# Patient Record
Sex: FEMALE | Race: WHITE | Employment: UNEMPLOYED | URBAN - METROPOLITAN AREA
[De-identification: names, ages, dates, MRNs, and addresses within clinical notes are randomized per-mention and may not be internally consistent; named-entity substitution may affect disease eponyms.]

---

## 2017-09-14 ENCOUNTER — TELEPHONE (OUTPATIENT)
Dept: ADMINISTRATIVE | Age: 57
End: 2017-09-14

## 2018-01-31 ENCOUNTER — APPOINTMENT (OUTPATIENT)
Dept: CT IMAGING | Age: 58
End: 2018-01-31
Payer: MEDICARE

## 2018-01-31 ENCOUNTER — HOSPITAL ENCOUNTER (EMERGENCY)
Age: 58
Discharge: HOME | End: 2018-01-31
Attending: STUDENT IN AN ORGANIZED HEALTH CARE EDUCATION/TRAINING PROGRAM
Payer: MEDICARE

## 2018-01-31 ENCOUNTER — APPOINTMENT (OUTPATIENT)
Dept: GENERAL RADIOLOGY | Age: 58
End: 2018-01-31
Payer: MEDICARE

## 2018-01-31 VITALS
HEIGHT: 60 IN | DIASTOLIC BLOOD PRESSURE: 81 MMHG | BODY MASS INDEX: 25.52 KG/M2 | HEART RATE: 72 BPM | TEMPERATURE: 97 F | SYSTOLIC BLOOD PRESSURE: 143 MMHG | WEIGHT: 130 LBS | RESPIRATION RATE: 18 BRPM | OXYGEN SATURATION: 96 %

## 2018-01-31 DIAGNOSIS — R11.2 NAUSEA AND VOMITING, INTRACTABILITY OF VOMITING NOT SPECIFIED, UNSPECIFIED VOMITING TYPE: Primary | ICD-10-CM

## 2018-01-31 DIAGNOSIS — K29.00 ACUTE GASTRITIS, PRESENCE OF BLEEDING UNSPECIFIED, UNSPECIFIED GASTRITIS TYPE: ICD-10-CM

## 2018-01-31 DIAGNOSIS — R07.89 CHEST WALL PAIN: ICD-10-CM

## 2018-01-31 DIAGNOSIS — R10.13 ABDOMINAL PAIN, EPIGASTRIC: ICD-10-CM

## 2018-01-31 LAB
ALBUMIN SERPL-MCNC: 3.7 G/DL (ref 3.9–4.9)
ALP BLD-CCNC: 74 U/L (ref 40–130)
ALT SERPL-CCNC: 8 U/L (ref 0–33)
AMMONIA: 29 UMOL/L (ref 11–51)
AMPHETAMINE SCREEN, URINE: POSITIVE
ANION GAP SERPL CALCULATED.3IONS-SCNC: 12 MEQ/L (ref 7–13)
APTT: 25.8 SEC (ref 21.6–35.4)
AST SERPL-CCNC: 17 U/L (ref 0–35)
BARBITURATE SCREEN URINE: ABNORMAL
BASOPHILS ABSOLUTE: 0 K/UL (ref 0–0.2)
BASOPHILS RELATIVE PERCENT: 0.7 %
BENZODIAZEPINE SCREEN, URINE: ABNORMAL
BILIRUB SERPL-MCNC: 0.3 MG/DL (ref 0–1.2)
BILIRUBIN URINE: NEGATIVE
BLOOD, URINE: NEGATIVE
BUN BLDV-MCNC: 16 MG/DL (ref 6–20)
C-REACTIVE PROTEIN, HIGH SENSITIVITY: 0.3 MG/L (ref 0–5)
CALCIUM SERPL-MCNC: 9.8 MG/DL (ref 8.6–10.2)
CANNABINOID SCREEN URINE: ABNORMAL
CHLORIDE BLD-SCNC: 101 MEQ/L (ref 98–107)
CHOLESTEROL, TOTAL: 142 MG/DL (ref 0–199)
CHP ED QC CHECK: YES
CLARITY: ABNORMAL
CO2: 28 MEQ/L (ref 22–29)
COCAINE METABOLITE SCREEN URINE: ABNORMAL
COLOR: YELLOW
CREAT SERPL-MCNC: 0.79 MG/DL (ref 0.5–0.9)
EKG ATRIAL RATE: 68 BPM
EKG P AXIS: -28 DEGREES
EKG P-R INTERVAL: 146 MS
EKG Q-T INTERVAL: 466 MS
EKG QRS DURATION: 142 MS
EKG QTC CALCULATION (BAZETT): 495 MS
EKG R AXIS: -47 DEGREES
EKG T AXIS: 28 DEGREES
EKG VENTRICULAR RATE: 68 BPM
EOSINOPHILS ABSOLUTE: 0.1 K/UL (ref 0–0.7)
EOSINOPHILS RELATIVE PERCENT: 1.4 %
ETHANOL PERCENT: NORMAL G/DL
ETHANOL: <10 MG/DL (ref 0–0.08)
GFR AFRICAN AMERICAN: >60
GFR NON-AFRICAN AMERICAN: >60
GLOBULIN: 2.3 G/DL (ref 2.3–3.5)
GLUCOSE BLD-MCNC: 87 MG/DL (ref 74–109)
GLUCOSE URINE: NEGATIVE MG/DL
HCT VFR BLD CALC: 41.4 % (ref 37–47)
HDLC SERPL-MCNC: 69 MG/DL (ref 40–59)
HEMOGLOBIN: 13.5 G/DL (ref 12–16)
INR BLD: 1
KETONES, URINE: NEGATIVE MG/DL
LACTIC ACID: 1.1 MMOL/L (ref 0.5–2.2)
LDL CHOLESTEROL CALCULATED: 54 MG/DL (ref 0–129)
LEUKOCYTE ESTERASE, URINE: NEGATIVE
LIPASE: 8 U/L (ref 13–60)
LYMPHOCYTES ABSOLUTE: 1.5 K/UL (ref 1–4.8)
LYMPHOCYTES RELATIVE PERCENT: 28.8 %
Lab: ABNORMAL
MAGNESIUM: 2.3 MG/DL (ref 1.7–2.3)
MCH RBC QN AUTO: 27.4 PG (ref 27–31.3)
MCHC RBC AUTO-ENTMCNC: 32.6 % (ref 33–37)
MCV RBC AUTO: 84 FL (ref 82–100)
MONOCYTES ABSOLUTE: 0.3 K/UL (ref 0.2–0.8)
MONOCYTES RELATIVE PERCENT: 5.8 %
NEUTROPHILS ABSOLUTE: 3.3 K/UL (ref 1.4–6.5)
NEUTROPHILS RELATIVE PERCENT: 63.3 %
NITRITE, URINE: NEGATIVE
OPIATE SCREEN URINE: ABNORMAL
PDW BLD-RTO: 17.2 % (ref 11.5–14.5)
PH UA: 8 (ref 5–9)
PHENCYCLIDINE SCREEN URINE: ABNORMAL
PLATELET # BLD: 201 K/UL (ref 130–400)
POTASSIUM SERPL-SCNC: 3.9 MEQ/L (ref 3.5–5.1)
PREGNANCY TEST URINE, POC: NEGATIVE
PRO-BNP: 298 PG/ML
PROTEIN UA: NEGATIVE MG/DL
PROTHROMBIN TIME: 10.9 SEC (ref 8.1–13.7)
RAPID INFLUENZA  B AGN: NEGATIVE
RAPID INFLUENZA A AGN: NEGATIVE
RBC # BLD: 4.93 M/UL (ref 4.2–5.4)
SODIUM BLD-SCNC: 141 MEQ/L (ref 132–144)
SPECIFIC GRAVITY UA: 1.01 (ref 1–1.03)
TOTAL CK: 66 U/L (ref 0–170)
TOTAL PROTEIN: 6 G/DL (ref 6.4–8.1)
TRIGL SERPL-MCNC: 94 MG/DL (ref 0–200)
TROPONIN: <0.01 NG/ML (ref 0–0.01)
TSH SERPL DL<=0.05 MIU/L-ACNC: 1.83 UIU/ML (ref 0.27–4.2)
URINE REFLEX TO CULTURE: ABNORMAL
UROBILINOGEN, URINE: 1 E.U./DL
WBC # BLD: 5.2 K/UL (ref 4.8–10.8)

## 2018-01-31 PROCEDURE — 81003 URINALYSIS AUTO W/O SCOPE: CPT

## 2018-01-31 PROCEDURE — 6360000004 HC RX CONTRAST MEDICATION: Performed by: STUDENT IN AN ORGANIZED HEALTH CARE EDUCATION/TRAINING PROGRAM

## 2018-01-31 PROCEDURE — 2580000003 HC RX 258: Performed by: STUDENT IN AN ORGANIZED HEALTH CARE EDUCATION/TRAINING PROGRAM

## 2018-01-31 PROCEDURE — S0028 INJECTION, FAMOTIDINE, 20 MG: HCPCS | Performed by: STUDENT IN AN ORGANIZED HEALTH CARE EDUCATION/TRAINING PROGRAM

## 2018-01-31 PROCEDURE — 99285 EMERGENCY DEPT VISIT HI MDM: CPT

## 2018-01-31 PROCEDURE — 86141 C-REACTIVE PROTEIN HS: CPT

## 2018-01-31 PROCEDURE — 83690 ASSAY OF LIPASE: CPT

## 2018-01-31 PROCEDURE — 84443 ASSAY THYROID STIM HORMONE: CPT

## 2018-01-31 PROCEDURE — 85025 COMPLETE CBC W/AUTO DIFF WBC: CPT

## 2018-01-31 PROCEDURE — 96375 TX/PRO/DX INJ NEW DRUG ADDON: CPT

## 2018-01-31 PROCEDURE — 82140 ASSAY OF AMMONIA: CPT

## 2018-01-31 PROCEDURE — G0480 DRUG TEST DEF 1-7 CLASSES: HCPCS

## 2018-01-31 PROCEDURE — 6360000002 HC RX W HCPCS: Performed by: STUDENT IN AN ORGANIZED HEALTH CARE EDUCATION/TRAINING PROGRAM

## 2018-01-31 PROCEDURE — 93005 ELECTROCARDIOGRAM TRACING: CPT

## 2018-01-31 PROCEDURE — 80307 DRUG TEST PRSMV CHEM ANLYZR: CPT

## 2018-01-31 PROCEDURE — 36415 COLL VENOUS BLD VENIPUNCTURE: CPT

## 2018-01-31 PROCEDURE — 85610 PROTHROMBIN TIME: CPT

## 2018-01-31 PROCEDURE — 83605 ASSAY OF LACTIC ACID: CPT

## 2018-01-31 PROCEDURE — 82550 ASSAY OF CK (CPK): CPT

## 2018-01-31 PROCEDURE — 80061 LIPID PANEL: CPT

## 2018-01-31 PROCEDURE — 84484 ASSAY OF TROPONIN QUANT: CPT

## 2018-01-31 PROCEDURE — 87040 BLOOD CULTURE FOR BACTERIA: CPT

## 2018-01-31 PROCEDURE — 74177 CT ABD & PELVIS W/CONTRAST: CPT

## 2018-01-31 PROCEDURE — 71046 X-RAY EXAM CHEST 2 VIEWS: CPT

## 2018-01-31 PROCEDURE — 80053 COMPREHEN METABOLIC PANEL: CPT

## 2018-01-31 PROCEDURE — 83880 ASSAY OF NATRIURETIC PEPTIDE: CPT

## 2018-01-31 PROCEDURE — 2500000003 HC RX 250 WO HCPCS: Performed by: STUDENT IN AN ORGANIZED HEALTH CARE EDUCATION/TRAINING PROGRAM

## 2018-01-31 PROCEDURE — 96374 THER/PROPH/DIAG INJ IV PUSH: CPT

## 2018-01-31 PROCEDURE — 86403 PARTICLE AGGLUT ANTBDY SCRN: CPT

## 2018-01-31 PROCEDURE — 83735 ASSAY OF MAGNESIUM: CPT

## 2018-01-31 PROCEDURE — 85730 THROMBOPLASTIN TIME PARTIAL: CPT

## 2018-01-31 RX ORDER — FAMOTIDINE 20 MG/1
20 TABLET, FILM COATED ORAL 2 TIMES DAILY
Qty: 20 TABLET | Refills: 0 | Status: SHIPPED | OUTPATIENT
Start: 2018-01-31 | End: 2018-02-26

## 2018-01-31 RX ORDER — ONDANSETRON 4 MG/1
4 TABLET, ORALLY DISINTEGRATING ORAL EVERY 8 HOURS PRN
Qty: 8 TABLET | Refills: 0 | Status: SHIPPED | OUTPATIENT
Start: 2018-01-31 | End: 2018-02-24 | Stop reason: ALTCHOICE

## 2018-01-31 RX ORDER — SODIUM CHLORIDE 0.9 % (FLUSH) 0.9 %
10 SYRINGE (ML) INJECTION PRN
Status: DISCONTINUED | OUTPATIENT
Start: 2018-01-31 | End: 2018-01-31 | Stop reason: HOSPADM

## 2018-01-31 RX ORDER — ONDANSETRON 2 MG/ML
4 INJECTION INTRAMUSCULAR; INTRAVENOUS ONCE
Status: COMPLETED | OUTPATIENT
Start: 2018-01-31 | End: 2018-01-31

## 2018-01-31 RX ORDER — 0.9 % SODIUM CHLORIDE 0.9 %
1000 INTRAVENOUS SOLUTION INTRAVENOUS ONCE
Status: COMPLETED | OUTPATIENT
Start: 2018-01-31 | End: 2018-01-31

## 2018-01-31 RX ADMIN — ONDANSETRON 4 MG: 2 INJECTION INTRAMUSCULAR; INTRAVENOUS at 15:14

## 2018-01-31 RX ADMIN — FAMOTIDINE 20 MG: 10 INJECTION, SOLUTION INTRAVENOUS at 15:14

## 2018-01-31 RX ADMIN — IOPAMIDOL 100 ML: 755 INJECTION, SOLUTION INTRAVENOUS at 16:18

## 2018-01-31 RX ADMIN — Medication 10 ML: at 16:19

## 2018-01-31 RX ADMIN — SODIUM CHLORIDE 1000 ML: 9 INJECTION, SOLUTION INTRAVENOUS at 15:14

## 2018-01-31 ASSESSMENT — PAIN DESCRIPTION - LOCATION: LOCATION: CHEST

## 2018-01-31 ASSESSMENT — PAIN DESCRIPTION - DESCRIPTORS: DESCRIPTORS: BURNING

## 2018-01-31 ASSESSMENT — PAIN SCALES - GENERAL: PAINLEVEL_OUTOF10: 8

## 2018-01-31 ASSESSMENT — ENCOUNTER SYMPTOMS
NAUSEA: 1
ABDOMINAL PAIN: 1
DIARRHEA: 0
COUGH: 1
SINUS PRESSURE: 0
VOMITING: 1
BACK PAIN: 0
CHEST TIGHTNESS: 0
TROUBLE SWALLOWING: 0
SHORTNESS OF BREATH: 0

## 2018-01-31 ASSESSMENT — HEART SCORE: ECG: 1

## 2018-01-31 NOTE — ED PROVIDER NOTES
syncope, weakness and headaches. Hematological: Does not bruise/bleed easily. All other systems reviewed and are negative. Except as noted above the remainder of the review of systems was reviewed and negative. PAST MEDICAL HISTORY     Past Medical History:   Diagnosis Date    Cirrhosis ([address]     Elevated LFTs     ETOH abuse     Heart murmur     Hypertension          SURGICAL HISTORY       Past Surgical History:   Procedure Laterality Date    CARDIAC CATHETERIZATION  4-2013    clean \"luminal irregs\"    CHOLECYSTECTOMY  6/22/13    Lapchole    COLONOSCOPY  9/2011    polyps needs by 2016    GASTRIC BYPASS SURGERY  2003    LAPAROSCOPY  1992    OTHER SURGICAL HISTORY      skin removal from abd/weight loss    TOTAL HIP ARTHROPLASTY  4-2012    left hip    UPPER GASTROINTESTINAL ENDOSCOPY  9-26-11    marino-polyps    UPPER GASTROINTESTINAL ENDOSCOPY  5/18/14     DR. NEAL         CURRENT MEDICATIONS       Previous Medications    ASCORBIC ACID (VITAMIN C) 500 MG TABLET    Take 1 tablet by mouth daily    BIOTIN 5 MG TABS    Take 5 mg by mouth daily    CALCIUM CARBONATE-VITAMIN D (OYSTER SHELL CALCIUM/D) 500-200 MG-UNIT TABS    Take 1 tablet by mouth daily    MAGNESIUM OXIDE (MAG-OX) 400 MG TABLET    Take 1 tablet by mouth daily    OMEPRAZOLE (PRILOSEC) 20 MG DELAYED RELEASE CAPSULE    TAKE ONE CAPSULE BY MOUTH TWO TIMES A DAY    POTASSIUM CHLORIDE (KLOR-CON M) 20 MEQ EXTENDED RELEASE TABLET    TAKE ONE TABLET BY MOUTH EVERY DAY IN ADDITION TO 10 MEQ TO TOTAL 30 MEQ DAILY    SERTRALINE (ZOLOFT) 100 MG TABLET    TAKE ONE TABLET BY MOUTH EVERY DAY    SUCRALFATE (CARAFATE) 1 GM TABLET    TAKE ONE TABLET BY MOUTH THREE TIMES A DAY    VALSARTAN (DIOVAN) 80 MG TABLET    Take 1 tablet by mouth daily    VITAMIN A 35587 UNITS CAPSULE    Take 1 capsule by mouth daily    VITAMIN B-12 (CYANOCOBALAMIN) 1000 MCG TABLET    Take 1 tablet by mouth daily       ALLERGIES     Penicillins    FAMILY HISTORY       Family History   Problem Relation Age of Onset    Diabetes Father     Heart Disease Father     Diabetes Sister     Thyroid Disease Sister     Cancer Other           SOCIAL HISTORY       Social History     Social History    Marital status:      Spouse name: N/A    Number of children: N/A    Years of education: N/A     Social History Main Topics    Smoking status: Current Every Day Smoker     Packs/day: 1.00     Years: 40.00     Types: Cigarettes    Smokeless tobacco: None    Alcohol use 0.0 oz/week      Comment: alcoholism    Drug use: Unknown    Sexual activity: Not Asked     Other Topics Concern    None     Social History Narrative    None       SCREENINGS    Valentin Coma Scale  Eye Opening: Spontaneous  Best Verbal Response: Oriented  Best Motor Response: Obeys commands  Valentin Coma Scale Score: 15 Heart Score for chest pain patients  History: Slightly Suspicious  ECG: Non-Specifc repolarization disturbance/LBTB/PM  Patient Age: > 39 and < 65 years  *Risk factors for Atherosclerotic disease: Hypertension  Risk Factors: 1 or 2 risk factors  Troponin: < 1X normal limit  Heart Score Total: 3      PHYSICAL EXAM    (up to 7 for level 4, 8 or more for level 5)     ED Triage Vitals [01/31/18 1338]   BP Temp Temp Source Pulse Resp SpO2 Height Weight   91/61 97 °F (36.1 °C) Temporal 75 16 98 % 5' (1.524 m) 130 lb (59 kg)       Physical Exam   Constitutional: She is oriented to person, place, and time. She appears well-developed and well-nourished. No distress. HENT:   Head: Normocephalic and atraumatic. Head is without Dickens's sign. Right Ear: External ear normal.   Left Ear: External ear normal.   Nose: Nose normal.   Mouth/Throat: Oropharynx is clear and moist. No oropharyngeal exudate. Eyes: Conjunctivae and EOM are normal. Pupils are equal, round, and reactive to light. Right eye exhibits no discharge. No foreign body present in the right eye. Left eye exhibits no discharge and no exudate.  No

## 2018-01-31 NOTE — ED NOTES
Lab in room to draw second set of blood cultures and ammonia level     Suha Mcdonnell RN  01/31/18 6019

## 2018-01-31 NOTE — ED NOTES
Pt states she is feeling a little better but she is tired, pt resting comfortably at this time     Wayne Pate, ANA  01/31/18 6092

## 2018-01-31 NOTE — ED NOTES
Pt states that she last saw the surgeon for the gastric bypass a year ago and she became angry because they wanted her to undergo all of the pre testing again and she does not want to, pt states she has been vomiting bile and she also thinks her hiatal hernia is causing her issues     Suha Mcdonnell RN  01/31/18 0432

## 2018-02-05 LAB
BLOOD CULTURE, ROUTINE: NORMAL
CULTURE, BLOOD 2: NORMAL

## 2018-02-24 ENCOUNTER — HOSPITAL ENCOUNTER (EMERGENCY)
Age: 58
Discharge: HOME | End: 2018-02-24
Payer: MEDICARE

## 2018-02-24 ENCOUNTER — APPOINTMENT (OUTPATIENT)
Dept: GENERAL RADIOLOGY | Age: 58
End: 2018-02-24
Payer: MEDICARE

## 2018-02-24 ENCOUNTER — APPOINTMENT (OUTPATIENT)
Dept: CT IMAGING | Age: 58
End: 2018-02-24
Payer: MEDICARE

## 2018-02-24 VITALS
HEART RATE: 79 BPM | DIASTOLIC BLOOD PRESSURE: 67 MMHG | TEMPERATURE: 97.6 F | RESPIRATION RATE: 16 BRPM | OXYGEN SATURATION: 99 % | SYSTOLIC BLOOD PRESSURE: 129 MMHG

## 2018-02-24 DIAGNOSIS — S09.90XA CLOSED HEAD INJURY, INITIAL ENCOUNTER: Primary | ICD-10-CM

## 2018-02-24 DIAGNOSIS — S00.03XA SCALP HEMATOMA, INITIAL ENCOUNTER: ICD-10-CM

## 2018-02-24 LAB
ALBUMIN SERPL-MCNC: 4.1 G/DL (ref 3.9–4.9)
ALP BLD-CCNC: 70 U/L (ref 40–130)
ALT SERPL-CCNC: 11 U/L (ref 0–33)
ANION GAP SERPL CALCULATED.3IONS-SCNC: 15 MEQ/L (ref 7–13)
APTT: 23.8 SEC (ref 21.6–35.4)
AST SERPL-CCNC: 22 U/L (ref 0–35)
BASOPHILS ABSOLUTE: 0.1 K/UL (ref 0–0.2)
BASOPHILS RELATIVE PERCENT: 0.8 %
BILIRUB SERPL-MCNC: 0.4 MG/DL (ref 0–1.2)
BUN BLDV-MCNC: 23 MG/DL (ref 6–20)
CALCIUM SERPL-MCNC: 9.9 MG/DL (ref 8.6–10.2)
CHLORIDE BLD-SCNC: 96 MEQ/L (ref 98–107)
CHP ED QC CHECK: NORMAL
CO2: 29 MEQ/L (ref 22–29)
CREAT SERPL-MCNC: 1.06 MG/DL (ref 0.5–0.9)
EKG ATRIAL RATE: 67 BPM
EKG P-R INTERVAL: 152 MS
EKG Q-T INTERVAL: 476 MS
EKG QRS DURATION: 140 MS
EKG QTC CALCULATION (BAZETT): 502 MS
EKG R AXIS: -37 DEGREES
EKG T AXIS: 49 DEGREES
EKG VENTRICULAR RATE: 67 BPM
EOSINOPHILS ABSOLUTE: 0 K/UL (ref 0–0.7)
EOSINOPHILS RELATIVE PERCENT: 0.7 %
ETHANOL PERCENT: NORMAL G/DL
ETHANOL: <10 MG/DL (ref 0–0.08)
GFR AFRICAN AMERICAN: >60
GFR NON-AFRICAN AMERICAN: 53.1
GLOBULIN: 2.6 G/DL (ref 2.3–3.5)
GLUCOSE BLD-MCNC: 101 MG/DL (ref 74–109)
GLUCOSE BLD-MCNC: 102 MG/DL
GLUCOSE BLD-MCNC: 102 MG/DL (ref 60–115)
HCT VFR BLD CALC: 43 % (ref 37–47)
HEMOGLOBIN: 14.1 G/DL (ref 12–16)
INR BLD: 1
LACTIC ACID: 2.1 MMOL/L (ref 0.5–2.2)
LYMPHOCYTES ABSOLUTE: 1.5 K/UL (ref 1–4.8)
LYMPHOCYTES RELATIVE PERCENT: 22.3 %
MCH RBC QN AUTO: 27.7 PG (ref 27–31.3)
MCHC RBC AUTO-ENTMCNC: 32.9 % (ref 33–37)
MCV RBC AUTO: 84.2 FL (ref 82–100)
MONOCYTES ABSOLUTE: 0.4 K/UL (ref 0.2–0.8)
MONOCYTES RELATIVE PERCENT: 5.6 %
NEUTROPHILS ABSOLUTE: 4.6 K/UL (ref 1.4–6.5)
NEUTROPHILS RELATIVE PERCENT: 70.6 %
PDW BLD-RTO: 16.9 % (ref 11.5–14.5)
PERFORMED ON: NORMAL
PLATELET # BLD: 218 K/UL (ref 130–400)
POTASSIUM SERPL-SCNC: 3.4 MEQ/L (ref 3.5–5.1)
PROTHROMBIN TIME: 10.8 SEC (ref 8.1–13.7)
RBC # BLD: 5.11 M/UL (ref 4.2–5.4)
SODIUM BLD-SCNC: 140 MEQ/L (ref 132–144)
TOTAL CK: 107 U/L (ref 0–170)
TOTAL PROTEIN: 6.7 G/DL (ref 6.4–8.1)
TROPONIN: <0.01 NG/ML (ref 0–0.01)
TSH SERPL DL<=0.05 MIU/L-ACNC: 1.56 UIU/ML (ref 0.27–4.2)
WBC # BLD: 6.6 K/UL (ref 4.8–10.8)

## 2018-02-24 PROCEDURE — 80053 COMPREHEN METABOLIC PANEL: CPT

## 2018-02-24 PROCEDURE — 84484 ASSAY OF TROPONIN QUANT: CPT

## 2018-02-24 PROCEDURE — 85610 PROTHROMBIN TIME: CPT

## 2018-02-24 PROCEDURE — 83605 ASSAY OF LACTIC ACID: CPT

## 2018-02-24 PROCEDURE — 71045 X-RAY EXAM CHEST 1 VIEW: CPT

## 2018-02-24 PROCEDURE — 93005 ELECTROCARDIOGRAM TRACING: CPT

## 2018-02-24 PROCEDURE — 85025 COMPLETE CBC W/AUTO DIFF WBC: CPT

## 2018-02-24 PROCEDURE — 36415 COLL VENOUS BLD VENIPUNCTURE: CPT

## 2018-02-24 PROCEDURE — 72125 CT NECK SPINE W/O DYE: CPT

## 2018-02-24 PROCEDURE — G0480 DRUG TEST DEF 1-7 CLASSES: HCPCS

## 2018-02-24 PROCEDURE — 99284 EMERGENCY DEPT VISIT MOD MDM: CPT

## 2018-02-24 PROCEDURE — 70450 CT HEAD/BRAIN W/O DYE: CPT

## 2018-02-24 PROCEDURE — 85730 THROMBOPLASTIN TIME PARTIAL: CPT

## 2018-02-24 PROCEDURE — 84443 ASSAY THYROID STIM HORMONE: CPT

## 2018-02-24 PROCEDURE — 82550 ASSAY OF CK (CPK): CPT

## 2018-02-24 PROCEDURE — 6370000000 HC RX 637 (ALT 250 FOR IP): Performed by: PHYSICIAN ASSISTANT

## 2018-02-24 RX ORDER — ACETAMINOPHEN 500 MG
1000 TABLET ORAL ONCE
Status: COMPLETED | OUTPATIENT
Start: 2018-02-24 | End: 2018-02-24

## 2018-02-24 RX ORDER — ONDANSETRON 4 MG/1
4-8 TABLET, ORALLY DISINTEGRATING ORAL EVERY 12 HOURS PRN
Qty: 20 TABLET | Refills: 0 | Status: SHIPPED | OUTPATIENT
Start: 2018-02-24

## 2018-02-24 RX ADMIN — ACETAMINOPHEN 1000 MG: 500 TABLET ORAL at 14:41

## 2018-02-24 ASSESSMENT — PAIN SCALES - GENERAL
PAINLEVEL_OUTOF10: 10
PAINLEVEL_OUTOF10: 9

## 2018-02-24 ASSESSMENT — ENCOUNTER SYMPTOMS
SORE THROAT: 0
TROUBLE SWALLOWING: 0
BACK PAIN: 0
SHORTNESS OF BREATH: 0
COUGH: 0
ABDOMINAL PAIN: 0
VOMITING: 0
PHOTOPHOBIA: 0

## 2018-02-24 ASSESSMENT — PAIN DESCRIPTION - ORIENTATION: ORIENTATION: LEFT

## 2018-02-24 ASSESSMENT — PAIN DESCRIPTION - PAIN TYPE: TYPE: ACUTE PAIN

## 2018-02-24 ASSESSMENT — PAIN DESCRIPTION - LOCATION: LOCATION: HEAD

## 2018-02-24 NOTE — ED PROVIDER NOTES
[address] [facility]  eMERGENCY dEPARTMENT eNCOUnter      Pt Name: Danish Max  MRN: [id number]  [place] 1960  Date of evaluation: 2/24/2018  Provider: Mariel Montanez PA-C      HISTORY OF PRESENT ILLNESS    HPI  Danish Max is a 61 y.o. female, who presents for evaluation of head injury. Patient states that she didn't eat or drink anything today, which is usual for her secondary to hiatal hernia, ulcers and food aversion. She states that because of this, she is frequently dizzy and lightheaded. She is following with GI for this. She states that today, she did lose consciousness and fell forward, hitting the L forehead. She denies CP, SOB, headache. She denies anticoagulant usage. REVIEW OF SYSTEMS       Review of Systems   Constitutional: Negative for chills and fever. HENT: Negative for ear pain, sore throat and trouble swallowing. Eyes: Negative for photophobia and visual disturbance. Respiratory: Negative for cough and shortness of breath. Cardiovascular: Negative for chest pain, palpitations and leg swelling. Gastrointestinal: Negative for abdominal pain and vomiting. Genitourinary: Negative for difficulty urinating, dysuria, flank pain and hematuria. Musculoskeletal: Negative for back pain. Neurological: Positive for light-headedness. Negative for syncope, speech difficulty, numbness and headaches. Psychiatric/Behavioral: Negative for agitation, confusion and hallucinations.          PAST MEDICAL HISTORY     Past Medical History:   Diagnosis Date    [address]osis ([facility] [address]     Elevated LFTs     ETOH abuse     Heart murmur     Hypertension          SURGICAL HISTORY       Past Surgical History:   Procedure Laterality Date    CARDIAC CATHETERIZATION  4-2013    clean \"luminal irregs\"    CHOLECYSTECTOMY  6/22/13    Lapchole    COLONOSCOPY  9/2011    polyps needs by 2016    GASTRIC BYPASS SURGERY  2003    LAPAROSCOPY  1992    OTHER SURGICAL HISTORY      skin removal from abd/weight loss    TOTAL HIP ARTHROPLASTY  4-2012    left hip    UPPER GASTROINTESTINAL ENDOSCOPY  9-26-11    marino-polyps    UPPER GASTROINTESTINAL ENDOSCOPY  5/18/14     DR. NEAL         CURRENT MEDICATIONS       Previous Medications    ASCORBIC ACID (VITAMIN C) 500 MG TABLET    Take 1 tablet by mouth daily    BIOTIN 5 MG TABS    Take 5 mg by mouth daily    CALCIUM CARBONATE-VITAMIN D (OYSTER SHELL CALCIUM/D) 500-200 MG-UNIT TABS    Take 1 tablet by mouth daily    FAMOTIDINE (PEPCID) 20 MG TABLET    Take 1 tablet by mouth 2 times daily    MAGNESIUM OXIDE (MAG-OX) 400 MG TABLET    Take 1 tablet by mouth daily    OMEPRAZOLE (PRILOSEC) 20 MG DELAYED RELEASE CAPSULE    TAKE ONE CAPSULE BY MOUTH TWO TIMES A DAY    ONDANSETRON (ZOFRAN ODT) 4 MG DISINTEGRATING TABLET    Take 1 tablet by mouth every 8 hours as needed for Nausea    POTASSIUM CHLORIDE (KLOR-CON M) 20 MEQ EXTENDED RELEASE TABLET    TAKE ONE TABLET BY MOUTH EVERY DAY IN ADDITION TO 10 MEQ TO TOTAL 30 MEQ DAILY    SERTRALINE (ZOLOFT) 100 MG TABLET    TAKE ONE TABLET BY MOUTH EVERY DAY    SUCRALFATE (CARAFATE) 1 GM TABLET    TAKE ONE TABLET BY MOUTH THREE TIMES A DAY    VALSARTAN (DIOVAN) 80 MG TABLET    Take 1 tablet by mouth daily    VITAMIN A 15287 UNITS CAPSULE    Take 1 capsule by mouth daily    VITAMIN B-12 (CYANOCOBALAMIN) 1000 MCG TABLET    Take 1 tablet by mouth daily       ALLERGIES     Penicillins    FAMILY HISTORY       Family History   Problem Relation Age of Onset    Diabetes Father     Heart Disease Father     Diabetes Sister     Thyroid Disease Sister     Cancer Other           SOCIAL HISTORY       Social History     Social History    Marital status:      Spouse name: N/A    Number of children: N/A    Years of education: N/A     Social History Main Topics    Smoking status: Current Every Day Smoker     Packs/day: 1.00     Years: 40.00     Types: Cigarettes    Smokeless tobacco: Not on file    Alcohol use 0.0

## 2018-02-26 ENCOUNTER — OFFICE VISIT (OUTPATIENT)
Dept: FAMILY MEDICINE CLINIC | Age: 58
End: 2018-02-26
Payer: MEDICARE

## 2018-02-26 VITALS
HEART RATE: 67 BPM | WEIGHT: 121 LBS | HEIGHT: 61 IN | DIASTOLIC BLOOD PRESSURE: 70 MMHG | BODY MASS INDEX: 22.84 KG/M2 | SYSTOLIC BLOOD PRESSURE: 110 MMHG | RESPIRATION RATE: 14 BRPM | TEMPERATURE: 98.8 F | OXYGEN SATURATION: 97 %
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
## 2018-02-26 DIAGNOSIS — Z98.84 S/P BARIATRIC SURGERY: ICD-10-CM

## 2018-02-26 DIAGNOSIS — M25.552 LEFT HIP PAIN: ICD-10-CM

## 2018-02-26 DIAGNOSIS — J44.1 COPD WITH ACUTE EXACERBATION (HCC): Primary | ICD-10-CM

## 2018-02-26 PROBLEM — K21.9 ESOPHAGEAL REFLUX: Status: ACTIVE | Noted: 2018-02-26

## 2018-02-26 PROCEDURE — G8427 DOCREV CUR MEDS BY ELIG CLIN: HCPCS | Performed by: NURSE PRACTITIONER

## 2018-02-26 PROCEDURE — G8484 FLU IMMUNIZE NO ADMIN: HCPCS | Performed by: NURSE PRACTITIONER

## 2018-02-26 PROCEDURE — 4004F PT TOBACCO SCREEN RCVD TLK: CPT | Performed by: NURSE PRACTITIONER

## 2018-02-26 PROCEDURE — G8420 CALC BMI NORM PARAMETERS: HCPCS | Performed by: NURSE PRACTITIONER

## 2018-02-26 PROCEDURE — 3023F SPIROM DOC REV: CPT | Performed by: NURSE PRACTITIONER

## 2018-02-26 PROCEDURE — G8926 SPIRO NO PERF OR DOC: HCPCS | Performed by: NURSE PRACTITIONER

## 2018-02-26 PROCEDURE — 99214 OFFICE O/P EST MOD 30 MIN: CPT | Performed by: NURSE PRACTITIONER

## 2018-02-26 PROCEDURE — 3014F SCREEN MAMMO DOC REV: CPT | Performed by: NURSE PRACTITIONER

## 2018-02-26 PROCEDURE — 3017F COLORECTAL CA SCREEN DOC REV: CPT | Performed by: NURSE PRACTITIONER

## 2018-02-26 RX ORDER — GUAIFENESIN 600 MG/1
1200 TABLET, EXTENDED RELEASE ORAL 2 TIMES DAILY
Qty: 60 TABLET | Refills: 2 | Status: SHIPPED | OUTPATIENT
Start: 2018-02-26

## 2018-02-26 RX ORDER — PREDNISONE 20 MG/1
40 TABLET ORAL DAILY
Qty: 10 TABLET | Refills: 0 | Status: SHIPPED | OUTPATIENT
Start: 2018-02-26 | End: 2018-03-03

## 2018-02-26 RX ORDER — AZITHROMYCIN 250 MG/1
TABLET, FILM COATED ORAL
Qty: 1 PACKET | Refills: 0 | Status: SHIPPED | OUTPATIENT
Start: 2018-02-26 | End: 2018-03-08

## 2018-02-26 ASSESSMENT — PATIENT HEALTH QUESTIONNAIRE - PHQ9
SUM OF ALL RESPONSES TO PHQ QUESTIONS 1-9: 0
SUM OF ALL RESPONSES TO PHQ9 QUESTIONS 1 & 2: 0
2. FEELING DOWN, DEPRESSED OR HOPELESS: 0

## 2018-02-28 ENCOUNTER — OFFICE VISIT (OUTPATIENT)
Dept: FAMILY MEDICINE CLINIC | Age: 58
End: 2018-02-28
Payer: MEDICARE

## 2018-02-28 VITALS
BODY MASS INDEX: 23.41 KG/M2 | HEIGHT: 61 IN | OXYGEN SATURATION: 99 % | WEIGHT: 124 LBS | SYSTOLIC BLOOD PRESSURE: 120 MMHG | RESPIRATION RATE: 14 BRPM | HEART RATE: 69 BPM | DIASTOLIC BLOOD PRESSURE: 80 MMHG | TEMPERATURE: 97.3 F

## 2018-02-28 DIAGNOSIS — E87.6 HYPOKALEMIA: ICD-10-CM

## 2018-02-28 DIAGNOSIS — K72.10 END STAGE LIVER DISEASE (HCC): ICD-10-CM

## 2018-02-28 DIAGNOSIS — K21.9 GASTROESOPHAGEAL REFLUX DISEASE, ESOPHAGITIS PRESENCE NOT SPECIFIED: ICD-10-CM

## 2018-02-28 DIAGNOSIS — F10.10 ETOH ABUSE: ICD-10-CM

## 2018-02-28 DIAGNOSIS — K74.60 CIRRHOSIS OF LIVER WITHOUT ASCITES, UNSPECIFIED HEPATIC CIRRHOSIS TYPE (HCC): ICD-10-CM

## 2018-02-28 DIAGNOSIS — K70.30 ESOPHAGEAL VARICES IN ALCOHOLIC CIRRHOSIS (HCC): ICD-10-CM

## 2018-02-28 DIAGNOSIS — I85.10 ESOPHAGEAL VARICES IN ALCOHOLIC CIRRHOSIS (HCC): ICD-10-CM

## 2018-02-28 DIAGNOSIS — I10 ESSENTIAL HYPERTENSION: Primary | ICD-10-CM

## 2018-02-28 LAB
ALBUMIN SERPL-MCNC: 3.9 G/DL (ref 3.9–4.9)
ALP BLD-CCNC: 72 U/L (ref 40–130)
ALT SERPL-CCNC: 12 U/L (ref 0–33)
AMMONIA: 32 UMOL/L (ref 11–51)
ANION GAP SERPL CALCULATED.3IONS-SCNC: 14 MEQ/L (ref 7–13)
AST SERPL-CCNC: 24 U/L (ref 0–35)
BILIRUB SERPL-MCNC: 0.3 MG/DL (ref 0–1.2)
BUN BLDV-MCNC: 20 MG/DL (ref 6–20)
CALCIUM SERPL-MCNC: 10 MG/DL (ref 8.6–10.2)
CHLORIDE BLD-SCNC: 98 MEQ/L (ref 98–107)
CO2: 33 MEQ/L (ref 22–29)
CREAT SERPL-MCNC: 0.63 MG/DL (ref 0.5–0.9)
GFR AFRICAN AMERICAN: >60
GFR NON-AFRICAN AMERICAN: >60
GLOBULIN: 2.5 G/DL (ref 2.3–3.5)
GLUCOSE BLD-MCNC: 89 MG/DL (ref 74–109)
POTASSIUM SERPL-SCNC: 3.2 MEQ/L (ref 3.5–5.1)
SODIUM BLD-SCNC: 145 MEQ/L (ref 132–144)
TOTAL PROTEIN: 6.4 G/DL (ref 6.4–8.1)

## 2018-02-28 PROCEDURE — 4004F PT TOBACCO SCREEN RCVD TLK: CPT | Performed by: FAMILY MEDICINE

## 2018-02-28 PROCEDURE — 3014F SCREEN MAMMO DOC REV: CPT | Performed by: FAMILY MEDICINE

## 2018-02-28 PROCEDURE — G8420 CALC BMI NORM PARAMETERS: HCPCS | Performed by: FAMILY MEDICINE

## 2018-02-28 PROCEDURE — 99214 OFFICE O/P EST MOD 30 MIN: CPT | Performed by: FAMILY MEDICINE

## 2018-02-28 PROCEDURE — G8427 DOCREV CUR MEDS BY ELIG CLIN: HCPCS | Performed by: FAMILY MEDICINE

## 2018-02-28 PROCEDURE — 3017F COLORECTAL CA SCREEN DOC REV: CPT | Performed by: FAMILY MEDICINE

## 2018-02-28 PROCEDURE — G8484 FLU IMMUNIZE NO ADMIN: HCPCS | Performed by: FAMILY MEDICINE

## 2018-02-28 RX ORDER — OMEPRAZOLE 20 MG/1
20 CAPSULE, DELAYED RELEASE ORAL DAILY
Qty: 30 CAPSULE | Refills: 3 | Status: SHIPPED | OUTPATIENT
Start: 2018-02-28

## 2018-02-28 NOTE — PROGRESS NOTES
Subjective  Nehal Reynoso, 61 y.o. female presents today with:  Chief Complaint   Patient presents with    Follow-Up from [facility] 2/24/18 - syncope,Closed head injury,Scalp hematoma    Results     hip xray       Claims all she takes is prilosec  Now on pred and zpak      Past Medical History:   Diagnosis Date    Cirrhosis ([address]     Elevated LFTs     ETOH abuse     Heart murmur     Hypertension      Past Surgical History:   Procedure Laterality Date    CARDIAC CATHETERIZATION  4-2013    clean \"luminal irregs\"    CHOLECYSTECTOMY  6/22/13    Lapchole    COLONOSCOPY  9/2011    polyps needs by 2016    GASTRIC BYPASS SURGERY  2003    LAPAROSCOPY  1992    OTHER SURGICAL HISTORY      skin removal from abd/weight loss    TOTAL HIP ARTHROPLASTY  4-2012    left hip    UPPER GASTROINTESTINAL ENDOSCOPY  9-26-11    marino-polyps    UPPER GASTROINTESTINAL ENDOSCOPY  5/18/14     DR. NEAL     Social History     Social History    Marital status:      Spouse name: N/A    Number of children: N/A    Years of education: N/A     Occupational History    Not on file.      Social History Main Topics    Smoking status: Current Every Day Smoker     Packs/day: 1.00     Years: 40.00     Types: Cigarettes    Smokeless tobacco: Never Used    Alcohol use 0.0 oz/week      Comment: alcoholism    Drug use: Unknown    Sexual activity: Not on file     Other Topics Concern    Not on file     Social History Narrative    No narrative on file     Family History   Problem Relation Age of Onset    Diabetes Father     Heart Disease Father     Diabetes Sister     Thyroid Disease Sister     Cancer Other      Allergies   Allergen Reactions    Penicillins      Current Outpatient Prescriptions   Medication Sig Dispense Refill    predniSONE (DELTASONE) 20 MG tablet Take 2 tablets by mouth daily for 5 days 10 tablet 0    azithromycin (ZITHROMAX) 250 MG tablet Take 2 tabs (500 mg) on Day 1, and take 1 tab (250 mg) be ordered if clinically indicated.  Lipase 01/31/2018 8* 13 - 60 U/L Final    aPTT 01/31/2018 25.8  21.6 - 35.4 sec Final    Pro-BNP 01/31/2018 298  pg/mL Final    Comment: NT-pro BNP ACUTE Interpretive Guidelines:        Age        Cutoff for Heart Failure     Less than 50 yrs   450 pg/mL     50-75 yrs           900 pg/mL     Greater than 75 yrs  1800 pg/mL    NT-pro BNP NON-ACUTE Interpretive Guidelines:      Age                 Reference Range    Less than 74 yrs   0-125 pg/mL    Greater than 74 yrs   0-450 pg/mL    Other possible causes of an elevated NT-proBNP include:  cardiac ischemia, acute coronary syndrome, COPD, pneumonia,  atrial fibrillation, pulmonary emboli, pulmonary hypertension,  pericarditis    Reference:  Giovani Schulz, et al. NT-proBNP testing for diagnosis and  short-term prognosis in acute destabilized HF: an international  pooled analysis of 1256 patients.   Heart Journal.  [id number];28:[id number]        WBC 01/31/2018 5.2  4.8 - 10.8 K/uL Final    RBC 01/31/2018 4.93  4.20 - 5.40 M/uL Final    Hemoglobin 01/31/2018 13.5  12.0 - 16.0 g/dL Final    Hematocrit 01/31/2018 41.4  37.0 - 47.0 % Final    MCV 01/31/2018 84.0  82.0 - 100.0 fL Final    MCH 01/31/2018 27.4  27.0 - 31.3 pg Final    MCHC 01/31/2018 32.6* 33.0 - 37.0 % Final    RDW 01/31/2018 17.2* 11.5 - 14.5 % Final    Platelets 50/56/4092 201  130 - 400 K/uL Final    Neutrophils % 01/31/2018 63.3  % Final    Lymphocytes % 01/31/2018 28.8  % Final    Monocytes % 01/31/2018 5.8  % Final    Eosinophils % 01/31/2018 1.4  % Final    Basophils % 01/31/2018 0.7  % Final    Neutrophils # 01/31/2018 3.3  1.4 - 6.5 K/uL Final    Lymphocytes # 01/31/2018 1.5  1.0 - 4.8 K/uL Final    Monocytes # 01/31/2018 0.3  0.2 - 0.8 K/uL Final    Eosinophils # 01/31/2018 0.1  0.0 - 0.7 K/uL Final    Basophils # 01/31/2018 0.0  0.0 - 0.2 K/uL Final    Total CK 01/31/2018 66  0 - 170 U/L Final    Sodium 01/31/2018 141  132 - 144 mEq/L

## 2018-03-03 LAB — HIV-1 AND HIV-2 ANTIBODIES: NEGATIVE

## 2018-03-06 ASSESSMENT — ENCOUNTER SYMPTOMS
SORE THROAT: 0
SPUTUM PRODUCTION: 1
HEARTBURN: 0
RHINORRHEA: 0
TROUBLE SWALLOWING: 0
WHEEZING: 1

## 2018-03-06 ASSESSMENT — COPD QUESTIONNAIRES: COPD: 1

## 2018-03-06 NOTE — PROGRESS NOTES
chest pain and PND. Gastrointestinal: Negative for heartburn. Musculoskeletal: Negative for myalgias. Neurological: Negative for headaches. Past Medical History:   Diagnosis Date    Cirrhosis ([address]     Elevated LFTs     ETOH abuse     Heart murmur     Hypertension      Past Surgical History:   Procedure Laterality Date    CARDIAC CATHETERIZATION  4-2013    clean \"luminal irregs\"    CHOLECYSTECTOMY  6/22/13    Lapchole    COLONOSCOPY  9/2011    polyps needs by 2016    GASTRIC BYPASS SURGERY  2003    LAPAROSCOPY  1992    OTHER SURGICAL HISTORY      skin removal from abd/weight loss    TOTAL HIP ARTHROPLASTY  4-2012    left hip    UPPER GASTROINTESTINAL ENDOSCOPY  9-26-11    marino-polyps    UPPER GASTROINTESTINAL ENDOSCOPY  5/18/14     DR. NEAL     Social History     Social History    Marital status:      Spouse name: N/A    Number of children: N/A    Years of education: N/A     Occupational History    Not on file.      Social History Main Topics    Smoking status: Current Every Day Smoker     Packs/day: 1.00     Years: 40.00     Types: Cigarettes    Smokeless tobacco: Never Used    Alcohol use 0.0 oz/week      Comment: alcoholism    Drug use: Unknown    Sexual activity: Not on file     Other Topics Concern    Not on file     Social History Narrative    No narrative on file     Family History   Problem Relation Age of Onset    Diabetes Father     Heart Disease Father     Diabetes Sister     Thyroid Disease Sister     Cancer Other      Allergies   Allergen Reactions    Penicillins      Current Outpatient Prescriptions   Medication Sig Dispense Refill    azithromycin (ZITHROMAX) 250 MG tablet Take 2 tabs (500 mg) on Day 1, and take 1 tab (250 mg) on days 2 through 5. 1 packet 0    guaiFENesin (MUCINEX) 600 MG extended release tablet Take 2 tablets by mouth 2 times daily 60 tablet 2    omeprazole (PRILOSEC) 20 MG delayed release capsule Take 1 capsule by mouth Daily 30

## 2018-05-01 ENCOUNTER — HOSPITAL ENCOUNTER (OUTPATIENT)
Dept: NUCLEAR MEDICINE | Age: 58
Discharge: HOME | End: 2018-05-03
Payer: MEDICARE

## 2018-05-01 DIAGNOSIS — K74.60 CIRRHOSIS OF LIVER WITHOUT ASCITES, UNSPECIFIED HEPATIC CIRRHOSIS TYPE (HCC): ICD-10-CM

## 2018-05-01 PROCEDURE — 78215 LVR&SPLEEN IMG STATIC ONLY: CPT

## 2018-05-01 PROCEDURE — A9541 TC99M SULFUR COLLOID: HCPCS | Performed by: INTERNAL MEDICINE

## 2018-05-01 PROCEDURE — 3430000000 HC RX DIAGNOSTIC RADIOPHARMACEUTICAL: Performed by: INTERNAL MEDICINE

## 2018-05-01 RX ADMIN — Medication 7.3 MILLICURIE: at 08:30

## 2018-12-09 ENCOUNTER — CARE COORDINATION (OUTPATIENT)
Dept: CARE COORDINATION | Age: 58
End: 2018-12-09

## 2018-12-10 ENCOUNTER — CARE COORDINATION (OUTPATIENT)
Dept: CARE COORDINATION | Age: 58
End: 2018-12-10

## 2018-12-18 ENCOUNTER — CARE COORDINATION (OUTPATIENT)
Dept: CARE COORDINATION | Age: 58
End: 2018-12-18

## 2019-05-06 ENCOUNTER — TELEPHONE (OUTPATIENT)
Dept: FAMILY MEDICINE CLINIC | Age: 59
End: 2019-05-06

## 2022-10-07 ENCOUNTER — HOSPITAL ENCOUNTER (EMERGENCY)
Age: 62
Discharge: HOME | End: 2022-10-07
Payer: MEDICARE

## 2022-10-07 ENCOUNTER — APPOINTMENT (OUTPATIENT)
Dept: GENERAL RADIOLOGY | Age: 62
End: 2022-10-07
Payer: MEDICARE

## 2022-10-07 ENCOUNTER — APPOINTMENT (OUTPATIENT)
Dept: CT IMAGING | Age: 62
End: 2022-10-07
Payer: MEDICARE

## 2022-10-07 VITALS
OXYGEN SATURATION: 98 % | DIASTOLIC BLOOD PRESSURE: 72 MMHG | RESPIRATION RATE: 16 BRPM | TEMPERATURE: 98.8 F | HEIGHT: 58 IN | HEART RATE: 88 BPM | BODY MASS INDEX: 27.29 KG/M2 | SYSTOLIC BLOOD PRESSURE: 122 MMHG | WEIGHT: 130 LBS

## 2022-10-07 DIAGNOSIS — T45.1X5A CHEMOTHERAPY-INDUCED FATIGUE: Primary | ICD-10-CM

## 2022-10-07 DIAGNOSIS — R51.9 NONINTRACTABLE HEADACHE, UNSPECIFIED CHRONICITY PATTERN, UNSPECIFIED HEADACHE TYPE: ICD-10-CM

## 2022-10-07 DIAGNOSIS — M79.10 MYALGIA: ICD-10-CM

## 2022-10-07 DIAGNOSIS — T45.1X5A CHEMOTHERAPY INDUCED NEUTROPENIA (HCC): ICD-10-CM

## 2022-10-07 DIAGNOSIS — D70.1 CHEMOTHERAPY INDUCED NEUTROPENIA (HCC): ICD-10-CM

## 2022-10-07 DIAGNOSIS — R53.83 CHEMOTHERAPY-INDUCED FATIGUE: Primary | ICD-10-CM

## 2022-10-07 LAB
ALBUMIN SERPL-MCNC: 3.5 G/DL (ref 3.5–4.6)
ALP BLD-CCNC: 89 U/L (ref 40–130)
ALT SERPL-CCNC: 17 U/L (ref 0–33)
AMPHETAMINE SCREEN, URINE: NORMAL
ANION GAP SERPL CALCULATED.3IONS-SCNC: 6 MEQ/L (ref 9–15)
AST SERPL-CCNC: 22 U/L (ref 0–35)
BANDED NEUTROPHILS RELATIVE PERCENT: 6 % (ref 5–11)
BARBITURATE SCREEN URINE: NORMAL
BASOPHILS ABSOLUTE: 0 K/UL (ref 0–0.2)
BASOPHILS RELATIVE PERCENT: 1 %
BENZODIAZEPINE SCREEN, URINE: NORMAL
BILIRUB SERPL-MCNC: 0.6 MG/DL (ref 0.2–0.7)
BILIRUBIN URINE: NEGATIVE
BLOOD, URINE: NEGATIVE
BUN BLDV-MCNC: 16 MG/DL (ref 8–23)
CALCIUM SERPL-MCNC: 8.7 MG/DL (ref 8.5–9.9)
CANNABINOID SCREEN URINE: NORMAL
CHLORIDE BLD-SCNC: 100 MEQ/L (ref 95–107)
CLARITY: CLEAR
CO2: 29 MEQ/L (ref 20–31)
COCAINE METABOLITE SCREEN URINE: NORMAL
COLOR: YELLOW
CREAT SERPL-MCNC: 0.51 MG/DL (ref 0.5–0.9)
EKG ATRIAL RATE: 82 BPM
EKG P AXIS: 27 DEGREES
EKG P-R INTERVAL: 122 MS
EKG Q-T INTERVAL: 420 MS
EKG QRS DURATION: 136 MS
EKG QTC CALCULATION (BAZETT): 490 MS
EKG R AXIS: -67 DEGREES
EKG T AXIS: 58 DEGREES
EKG VENTRICULAR RATE: 82 BPM
EOSINOPHILS ABSOLUTE: 0.1 K/UL (ref 0–0.7)
EOSINOPHILS RELATIVE PERCENT: 4 %
ETHANOL PERCENT: NORMAL G/DL
ETHANOL: <10 MG/DL (ref 0–0.08)
FENTANYL SCREEN, URINE: NORMAL
GFR AFRICAN AMERICAN: >60
GFR NON-AFRICAN AMERICAN: >60
GLOBULIN: 2.4 G/DL (ref 2.3–3.5)
GLUCOSE BLD-MCNC: 94 MG/DL (ref 70–99)
GLUCOSE URINE: NEGATIVE MG/DL
HCT VFR BLD CALC: 40.2 % (ref 37–47)
HEMOGLOBIN: 13.3 G/DL (ref 12–16)
INFLUENZA A BY PCR: NEGATIVE
INFLUENZA B BY PCR: NEGATIVE
KETONES, URINE: NEGATIVE MG/DL
LACTIC ACID: 1.4 MMOL/L (ref 0.5–2.2)
LEUKOCYTE ESTERASE, URINE: NEGATIVE
LIPASE: 7 U/L (ref 12–95)
LYMPHOCYTES ABSOLUTE: 0.4 K/UL (ref 1–4.8)
LYMPHOCYTES RELATIVE PERCENT: 14 %
Lab: NORMAL
MCH RBC QN AUTO: 27.5 PG (ref 27–31.3)
MCHC RBC AUTO-ENTMCNC: 33 % (ref 33–37)
MCV RBC AUTO: 83.4 FL (ref 82–100)
METAMYELOCYTES RELATIVE PERCENT: 2 %
METHADONE SCREEN, URINE: NORMAL
MONOCYTES ABSOLUTE: 0 K/UL (ref 0.2–0.8)
MONOCYTES RELATIVE PERCENT: 0.9 %
MYELOCYTE PERCENT: 9 %
NEUTROPHILS ABSOLUTE: 2.1 K/UL (ref 1.4–6.5)
NEUTROPHILS RELATIVE PERCENT: 63 %
NITRITE, URINE: NEGATIVE
OPIATE SCREEN URINE: NORMAL
OVALOCYTES: ABNORMAL
OXYCODONE URINE: NORMAL
PDW BLD-RTO: 16 % (ref 11.5–14.5)
PH UA: 7.5 (ref 5–9)
PHENCYCLIDINE SCREEN URINE: NORMAL
PLATELET # BLD: 108 K/UL (ref 130–400)
PLATELET SLIDE REVIEW: ABNORMAL
POIKILOCYTES: ABNORMAL
POTASSIUM SERPL-SCNC: 3.5 MEQ/L (ref 3.4–4.9)
PROPOXYPHENE SCREEN: NORMAL
PROTEIN UA: NEGATIVE MG/DL
RBC # BLD: 4.83 M/UL (ref 4.2–5.4)
SARS-COV-2, NAAT: NOT DETECTED
SODIUM BLD-SCNC: 135 MEQ/L (ref 135–144)
SPECIFIC GRAVITY UA: 1.01 (ref 1–1.03)
TOTAL PROTEIN: 5.9 G/DL (ref 6.3–8)
URINE REFLEX TO CULTURE: NORMAL
UROBILINOGEN, URINE: 0.2 E.U./DL
WBC # BLD: 2.6 K/UL (ref 4.8–10.8)

## 2022-10-07 PROCEDURE — 6360000002 HC RX W HCPCS: Performed by: PHYSICIAN ASSISTANT

## 2022-10-07 PROCEDURE — 81003 URINALYSIS AUTO W/O SCOPE: CPT

## 2022-10-07 PROCEDURE — 83690 ASSAY OF LIPASE: CPT

## 2022-10-07 PROCEDURE — 70450 CT HEAD/BRAIN W/O DYE: CPT

## 2022-10-07 PROCEDURE — 80053 COMPREHEN METABOLIC PANEL: CPT

## 2022-10-07 PROCEDURE — 36415 COLL VENOUS BLD VENIPUNCTURE: CPT

## 2022-10-07 PROCEDURE — 93005 ELECTROCARDIOGRAM TRACING: CPT | Performed by: PHYSICIAN ASSISTANT

## 2022-10-07 PROCEDURE — 87502 INFLUENZA DNA AMP PROBE: CPT

## 2022-10-07 PROCEDURE — 93010 ELECTROCARDIOGRAM REPORT: CPT | Performed by: INTERNAL MEDICINE

## 2022-10-07 PROCEDURE — 83605 ASSAY OF LACTIC ACID: CPT

## 2022-10-07 PROCEDURE — 96374 THER/PROPH/DIAG INJ IV PUSH: CPT

## 2022-10-07 PROCEDURE — 85025 COMPLETE CBC W/AUTO DIFF WBC: CPT

## 2022-10-07 PROCEDURE — 2580000003 HC RX 258: Performed by: PHYSICIAN ASSISTANT

## 2022-10-07 PROCEDURE — 82077 ASSAY SPEC XCP UR&BREATH IA: CPT

## 2022-10-07 PROCEDURE — 87040 BLOOD CULTURE FOR BACTERIA: CPT

## 2022-10-07 PROCEDURE — 87635 SARS-COV-2 COVID-19 AMP PRB: CPT

## 2022-10-07 PROCEDURE — 80307 DRUG TEST PRSMV CHEM ANLYZR: CPT

## 2022-10-07 PROCEDURE — 96361 HYDRATE IV INFUSION ADD-ON: CPT

## 2022-10-07 PROCEDURE — 96375 TX/PRO/DX INJ NEW DRUG ADDON: CPT

## 2022-10-07 PROCEDURE — 99285 EMERGENCY DEPT VISIT HI MDM: CPT

## 2022-10-07 PROCEDURE — 71045 X-RAY EXAM CHEST 1 VIEW: CPT

## 2022-10-07 RX ORDER — KETOROLAC TROMETHAMINE 15 MG/ML
15 INJECTION, SOLUTION INTRAMUSCULAR; INTRAVENOUS ONCE
Status: COMPLETED | OUTPATIENT
Start: 2022-10-07 | End: 2022-10-07

## 2022-10-07 RX ORDER — TRAMADOL HYDROCHLORIDE 50 MG/1
50 TABLET ORAL EVERY 6 HOURS PRN
Qty: 12 TABLET | Refills: 0 | Status: SHIPPED | OUTPATIENT
Start: 2022-10-07 | End: 2022-10-10

## 2022-10-07 RX ORDER — 0.9 % SODIUM CHLORIDE 0.9 %
1000 INTRAVENOUS SOLUTION INTRAVENOUS ONCE
Status: COMPLETED | OUTPATIENT
Start: 2022-10-07 | End: 2022-10-07

## 2022-10-07 RX ORDER — ONDANSETRON 2 MG/ML
4 INJECTION INTRAMUSCULAR; INTRAVENOUS ONCE
Status: COMPLETED | OUTPATIENT
Start: 2022-10-07 | End: 2022-10-07

## 2022-10-07 RX ORDER — METOCLOPRAMIDE HYDROCHLORIDE 5 MG/ML
10 INJECTION INTRAMUSCULAR; INTRAVENOUS ONCE
Status: COMPLETED | OUTPATIENT
Start: 2022-10-07 | End: 2022-10-07

## 2022-10-07 RX ORDER — 0.9 % SODIUM CHLORIDE 0.9 %
500 INTRAVENOUS SOLUTION INTRAVENOUS ONCE
Status: COMPLETED | OUTPATIENT
Start: 2022-10-07 | End: 2022-10-07

## 2022-10-07 RX ADMIN — METOCLOPRAMIDE 10 MG: 5 INJECTION, SOLUTION INTRAMUSCULAR; INTRAVENOUS at 11:31

## 2022-10-07 RX ADMIN — SODIUM CHLORIDE 500 ML: 9 INJECTION, SOLUTION INTRAVENOUS at 11:30

## 2022-10-07 RX ADMIN — KETOROLAC TROMETHAMINE 15 MG: 15 INJECTION, SOLUTION INTRAMUSCULAR; INTRAVENOUS at 11:31

## 2022-10-07 RX ADMIN — SODIUM CHLORIDE 1000 ML: 9 INJECTION, SOLUTION INTRAVENOUS at 12:42

## 2022-10-07 RX ADMIN — ONDANSETRON 4 MG: 2 INJECTION INTRAMUSCULAR; INTRAVENOUS at 11:31

## 2022-10-07 ASSESSMENT — ENCOUNTER SYMPTOMS
ABDOMINAL DISTENTION: 0
ABDOMINAL PAIN: 0
SHORTNESS OF BREATH: 0
RHINORRHEA: 0
VOMITING: 1
SORE THROAT: 0
EYE DISCHARGE: 0
CONSTIPATION: 0
COLOR CHANGE: 0
NAUSEA: 1

## 2022-10-07 ASSESSMENT — PAIN DESCRIPTION - LOCATION
LOCATION: HEAD
LOCATION: HEAD

## 2022-10-07 ASSESSMENT — PAIN DESCRIPTION - FREQUENCY: FREQUENCY: CONTINUOUS

## 2022-10-07 ASSESSMENT — PAIN DESCRIPTION - DESCRIPTORS
DESCRIPTORS: PRESSURE;SHARP
DESCRIPTORS: ACHING

## 2022-10-07 ASSESSMENT — PAIN SCALES - GENERAL
PAINLEVEL_OUTOF10: 3
PAINLEVEL_OUTOF10: 10

## 2022-10-07 ASSESSMENT — PAIN - FUNCTIONAL ASSESSMENT
PAIN_FUNCTIONAL_ASSESSMENT: 0-10
PAIN_FUNCTIONAL_ASSESSMENT: 0-10

## 2022-10-07 ASSESSMENT — PAIN DESCRIPTION - ONSET: ONSET: ON-GOING

## 2022-10-07 ASSESSMENT — PAIN DESCRIPTION - PAIN TYPE: TYPE: ACUTE PAIN

## 2022-10-07 ASSESSMENT — LIFESTYLE VARIABLES
HOW MANY STANDARD DRINKS CONTAINING ALCOHOL DO YOU HAVE ON A TYPICAL DAY: PATIENT DOES NOT DRINK
HOW OFTEN DO YOU HAVE A DRINK CONTAINING ALCOHOL: NEVER

## 2022-10-07 NOTE — ED TRIAGE NOTES
Pt brought in to ER with  c/o general illness. Pt is currently undergoing chemo. Last chemo was Monday for breast cancer. Pt reported she started to feel ill Tuesday. Pt is complaining of a headache and body aches.

## 2022-10-07 NOTE — ED PROVIDER NOTES
[facility] ED  eMERGENCY dEPARTMENT eNCOUnter      Pt Name: Katia Kwan  MRN: [id number]  Armsjohngfwilson 1960  Date of evaluation: 10/7/2022  Provider: Kathy Crouch, PA-C    CHIEF COMPLAINT       Chief Complaint   Patient presents with    Headache         HISTORY OF PRESENT ILLNESS   (Location/Symptom, Timing/Onset,Context/Setting, Quality, Duration, Modifying Factors, Severity)  Note limiting factors. Katia Kwan is a 60 y.o. female who presents to the emergency department lanes of headache, chills, nausea, which patient states started on Monday, 10/3/2022 following chemotherapy which she is receiving for breast cancer, she states this was her first treatment. She states symptoms started almost immediately following it been persistent since that time. She states she cannot keep anything down at this point. She denies any fevers. Medical history significant for hypertension, cirrhosis, alcohol abuse, breast cancer. HPI    NursingNotes were reviewed. REVIEW OF SYSTEMS    (2-9 systems for level 4, 10 or more for level 5)     Review of Systems   Constitutional:  Positive for fatigue. Negative for activity change and appetite change. HENT:  Negative for congestion, ear discharge, ear pain, nosebleeds, rhinorrhea and sore throat. Eyes:  Negative for discharge. Respiratory:  Negative for shortness of breath. Cardiovascular:  Negative for chest pain, palpitations and leg swelling. Gastrointestinal:  Positive for nausea and vomiting. Negative for abdominal distention, abdominal pain and constipation. Genitourinary:  Negative for difficulty urinating and dysuria. Musculoskeletal:  Positive for myalgias. Negative for arthralgias. Skin:  Negative for color change. Neurological:  Positive for headaches. Negative for dizziness, syncope and numbness. Psychiatric/Behavioral:  Negative for agitation and confusion.       Except as noted above the remainder of the review of systems was reviewed and negative. PAST MEDICAL HISTORY     Past Medical History:   Diagnosis Date    Cirrhosis ([address]     Elevated LFTs     ETOH abuse     Heart murmur     Hypertension          SURGICALHISTORY       Past Surgical History:   Procedure Laterality Date    CARDIAC CATHETERIZATION  4-2013    clean \"luminal irregs\"    CHOLECYSTECTOMY  6/22/13    Lapchole    COLONOSCOPY  9/2011    polyps needs by 2016    GASTRIC BYPASS SURGERY  2003    LAPAROSCOPY  1992    OTHER SURGICAL HISTORY      skin removal from abd/weight loss    TOTAL HIP ARTHROPLASTY  4-2012    left hip    UPPER GASTROINTESTINAL ENDOSCOPY  9-26-11    marino-polyps    UPPER GASTROINTESTINAL ENDOSCOPY  5/18/14     DR. NEAL         CURRENT MEDICATIONS       Previous Medications    GUAIFENESIN (MUCINEX) 600 MG EXTENDED RELEASE TABLET    Take 2 tablets by mouth 2 times daily    OMEPRAZOLE (PRILOSEC) 20 MG DELAYED RELEASE CAPSULE    Take 1 capsule by mouth Daily    ONDANSETRON (ZOFRAN ODT) 4 MG DISINTEGRATING TABLET    Take 1-2 tablets by mouth every 12 hours as needed for Nausea May Sub regular tablet (non-ODT) if insurance does not cover ODT. ALLERGIES     Penicillins    FAMILY HISTORY       Family History   Problem Relation Age of Onset    Diabetes Father     Heart Disease Father     Diabetes Sister     Thyroid Disease Sister     Cancer Other           SOCIAL HISTORY       Social History     Socioeconomic History    Marital status:      Spouse name: None    Number of children: None    Years of education: None    Highest education level: None   Tobacco Use    Smoking status: Every Day     Packs/day: 1.00     Years: 40.00     Pack years: 40.00     Types: Cigarettes    Smokeless tobacco: Never   Substance and Sexual Activity    Alcohol use:  Yes     Alcohol/week: 0.0 standard drinks     Comment: alcoholism       SCREENINGS    [place] Coma Scale  Eye Opening: Spontaneous  Best Verbal Response: Oriented  Best Motor Response: Obeys commands  [place] Coma Scale Score: 15 @FLOW([id number])@      PHYSICAL EXAM    (up to 7 for level 4, 8 or more for level 5)     ED Triage Vitals [10/07/22 0958]   BP Temp Temp Source Heart Rate Resp SpO2 Height Weight   124/71 98.8 °F (37.1 °C) Oral 93 16 99 % 4' 10\" (1.473 m) 130 lb (59 kg)       Physical Exam  Vitals and nursing note reviewed. Constitutional:       General: She is not in acute distress. Appearance: She is well-developed. She is not ill-appearing, toxic-appearing or diaphoretic. HENT:      Head: Normocephalic. Right Ear: Tympanic membrane normal.      Left Ear: Tympanic membrane normal.      Nose: Nose normal. No congestion. Mouth/Throat:      Mouth: Mucous membranes are moist.      Pharynx: No oropharyngeal exudate or posterior oropharyngeal erythema. Eyes:      Extraocular Movements: Extraocular movements intact. Conjunctiva/sclera: Conjunctivae normal.      Pupils: Pupils are equal, round, and reactive to light. Neck:      Vascular: No JVD. Trachea: No tracheal deviation. Cardiovascular:      Rate and Rhythm: Normal rate. Pulses: Normal pulses. Heart sounds: Normal heart sounds. No murmur heard. No friction rub. No gallop. Pulmonary:      Effort: Pulmonary effort is normal. No tachypnea, accessory muscle usage, respiratory distress or retractions. Breath sounds: Normal breath sounds. No stridor. No wheezing, rhonchi or rales. Chest:      Chest wall: No tenderness. Abdominal:      General: Abdomen is flat. Bowel sounds are normal. There is no distension or abdominal bruit. Palpations: There is no shifting dullness, fluid wave, hepatomegaly, splenomegaly, mass or pulsatile mass. Tenderness: There is no abdominal tenderness. There is no right CVA tenderness, left CVA tenderness, guarding or rebound. Negative signs include Iyer's sign, Rovsing's sign and McBurney's sign. Musculoskeletal:         General: No deformity.  Normal range of motion. Cervical back: Normal range of motion and neck supple. No rigidity. Skin:     General: Skin is warm and dry. Capillary Refill: Capillary refill takes less than 2 seconds. Coloration: Skin is not jaundiced. Neurological:      General: No focal deficit present. Mental Status: She is alert and oriented to person, place, and time. Mental status is at baseline. Cranial Nerves: No cranial nerve deficit. Sensory: No sensory deficit. Motor: No weakness. Coordination: Coordination normal.      Comments: Patient alert and oriented, neurologically intact, no facial droop, no slurring of speech, no drift upper lower extremities. Psychiatric:         Mood and Affect: Mood normal.       DIAGNOSTIC RESULTS     EKG: All EKG's are interpreted by the Emergency Department Physician who either signs or Co-signsthis chart in the absence of a cardiologist.    EKG shows sinus rhythm with occasional premature atrial complexes at 82 bpm there is right bundle branch block, T wave versions in leads V1 and V2 no ventricular ectopy  ms    RADIOLOGY:   Non-plain filmimages such as CT, Ultrasound and MRI are read by the radiologist. Plain radiographic images are visualized and preliminarily interpreted by the emergency physician with the below findings:    Interpretation per the Radiologist below, if available at the time ofthis note:    CT Head WO Contrast   Final Result   No acute intracranial abnormality. Mild prominence of the right cavernous carotid artery is visualized, this was   not seen on the prior study. RECOMMENDATIONS:   Recommend a CTA of the head to evaluate the right cavernous carotid artery. XR CHEST PORTABLE   Final Result   No evidence of acute cardiopulmonary disease is seen.                ED BEDSIDE ULTRASOUND:   Performed by ED Physician - none    LABS:  Labs Reviewed   COMPREHENSIVE METABOLIC PANEL - Abnormal; Notable for the following components:       Result Value    Anion Gap 6 (*)     Total Protein 5.9 (*)     All other components within normal limits   CBC WITH AUTO DIFFERENTIAL - Abnormal; Notable for the following components:    WBC 2.6 (*)     RDW 16.0 (*)     Platelets 342 (*)     Lymphocytes Absolute 0.4 (*)     Monocytes Absolute 0.0 (*)     All other components within normal limits   LIPASE - Abnormal; Notable for the following components:    Lipase 7 (*)     All other components within normal limits   COVID-19, RAPID   RAPID INFLUENZA A/B ANTIGENS   CULTURE, BLOOD 2   CULTURE, BLOOD 1   ETHANOL   LACTIC ACID   URINALYSIS WITH REFLEX TO CULTURE   URINE DRUG SCREEN       All other labs were within normal range or not returned as of this dictation. EMERGENCY DEPARTMENT COURSE and DIFFERENTIAL DIAGNOSIS/MDM:   Vitals:    Vitals:    10/07/22 0958   BP: 124/71   Pulse: 93   Resp: 16   Temp: 98.8 °F (37.1 °C)   TempSrc: Oral   SpO2: 99%   Weight: 130 lb (59 kg)   Height: 4' 10\" (1.473 m)            MDM  Number of Diagnoses or Management Options  Chemotherapy induced neutropenia (HCC)  Chemotherapy-induced fatigue  Myalgia  Nonintractable headache, unspecified chronicity pattern, unspecified headache type  Diagnosis management comments: Presented to the emergency department with complaint of headache, chills, nausea, which started Monday after completing her first treatment with chemotherapy for breast cancer. Patient states she does have medications at home for nausea but does not seem to alleviate her symptoms, she has decreased appetite. She seems significantly uncomfortable on initial examination, complained of headache which has been ongoing for the last 3 to 4 days and not resolving. CT scan the brain shows no acute intracranial process, labs are fairly nonspecific at this time, she is negative for COVID, negative for influenza. BUN is 16, creatinine 0.51. ALT is 17, AST is 22. Total bili is 0.6. White count is 2.6.   After IV hydration, nausea medications and pain medication, patient is much more comfortable at this time. She was discharged home, and with a prescription for pain medication for recurrent headaches. She was advised her white count has dropped since starting chemotherapy and she should discuss this with her oncologist.  She was advised to monitor for risk for infection. Should she have any worsening or changes symptoms, she was advised return to the ED. Amount and/or Complexity of Data Reviewed  Decide to obtain previous medical records or to obtain history from someone other than the patient: yes        CRITICAL CARE TIME   Total Critical Care time was 0 minutes, excluding separately reportableprocedures. There was a high probability of clinicallysignificant/life threatening deterioration in the patient's condition which required my urgent intervention. CONSULTS:  None    PROCEDURES:  Unless otherwise noted below, none     Procedures    FINAL IMPRESSION      1. Chemotherapy-induced fatigue    2. Nonintractable headache, unspecified chronicity pattern, unspecified headache type    3. Myalgia    4. Chemotherapy induced neutropenia [facility])          DISPOSITION/PLAN   DISPOSITION Decision To Discharge 10/07/2022 01:38:15 PM      PATIENT REFERRED TO:  Aden Moody MD  [facility] [id number]  [phone]    In 3 days      Elizabeth Manual, DO  [address] [facility] [place] [phone]    In 2 days      DISCHARGE MEDICATIONS:  New Prescriptions    TRAMADOL (ULTRAM) 50 MG TABLET    Take 1 tablet by mouth every 6 hours as needed for Pain for up to 3 days.           (Please note that portions of this note were completed with a voice recognition program.  Efforts were made to edit the dictations but occasionally words are mis-transcribed.)    Reed Oconnell PA-C (electronically signed)  Attending Emergency Physician         Reed Oconnell PA-C  10/07/22 [address] [facility] Fara Warner PA-C  10/07/22 4938

## 2022-10-12 LAB — BLOOD CULTURE, ROUTINE: NORMAL

## 2022-10-13 LAB — CULTURE, BLOOD 2: NORMAL

## 2022-10-26 ENCOUNTER — SOCIAL WORK (OUTPATIENT)
Dept: RADIATION ONCOLOGY | Age: 62
End: 2022-10-26

## 2022-10-26 NOTE — PROGRESS NOTES
This SW received a referral from MAYCOL Morley/Dr. Gotti on 10/25/22 to offer emotional support. SW spoke with Pt today (10/26/22) by phone. Pt states \"I have good days and bad days\"; stating was feeling pretty good today. Pt was able to laugh often during this phone conversation; stating she was adapting to her hair loss. Pt shared hx of multiple deaths in her family over the past several years which have been challenging. SW provided emotional support surrounding grief issues while validating and normalizing her grief reactions and positive coping mechanisms. Pt states no present needs from this SW; stating appreciation for the phone call. As Pt returns to the cancer center on Mondays; SW asked Pt to introduce herself in person when she arrives to the cancer center. Pt agreeable to this plan and to continued support as needed.

## 2022-10-31 ENCOUNTER — SOCIAL WORK (OUTPATIENT)
Dept: RADIATION ONCOLOGY | Age: 62
End: 2022-10-31

## 2023-06-21 ENCOUNTER — TELEPHONE (OUTPATIENT)
Dept: PRIMARY CARE | Facility: CLINIC | Age: 63
End: 2023-06-21
Payer: MEDICARE

## 2023-06-21 DIAGNOSIS — M79.672 PAIN IN BOTH FEET: ICD-10-CM

## 2023-06-21 DIAGNOSIS — Z23 NEED FOR VACCINATION: ICD-10-CM

## 2023-06-21 DIAGNOSIS — M79.671 PAIN IN BOTH FEET: ICD-10-CM

## 2023-06-21 NOTE — TELEPHONE ENCOUNTER
Patient called to notify her that her Shingrix prescription is ready for  or we can mail.  Filed in front office.

## 2023-06-21 NOTE — TELEPHONE ENCOUNTER
patient in need of Referrals for Shingle shot to take to pharmacy to have done... Podiatry referral for feet pain .... back pain specialist

## 2023-07-27 DIAGNOSIS — M54.50 ACUTE LOW BACK PAIN, UNSPECIFIED BACK PAIN LATERALITY, UNSPECIFIED WHETHER SCIATICA PRESENT: Primary | ICD-10-CM

## 2023-08-02 PROBLEM — F10.21 HISTORY OF ALCOHOLISM (MULTI): Status: ACTIVE | Noted: 2023-08-02

## 2023-08-02 PROBLEM — I10 ESSENTIAL HYPERTENSION: Status: ACTIVE | Noted: 2023-08-02

## 2023-08-02 PROBLEM — K74.60 CIRRHOSIS (MULTI): Status: RESOLVED | Noted: 2023-08-02 | Resolved: 2023-08-02

## 2023-08-02 PROBLEM — F15.10 METHAMPHETAMINE USE (MULTI): Status: ACTIVE | Noted: 2023-08-02

## 2023-08-02 PROBLEM — R32 URINARY INCONTINENCE: Status: ACTIVE | Noted: 2023-08-02

## 2023-08-02 PROBLEM — C50.412 MALIGNANT NEOPLASM OF UPPER-OUTER QUADRANT OF LEFT FEMALE BREAST (MULTI): Status: ACTIVE | Noted: 2023-08-02

## 2023-08-02 PROBLEM — K74.60 CIRRHOSIS (MULTI): Status: ACTIVE | Noted: 2023-08-02

## 2023-08-02 PROBLEM — E78.5 HYPERLIPIDEMIA, UNSPECIFIED: Status: ACTIVE | Noted: 2023-08-02

## 2023-08-02 PROBLEM — J44.9 COPD (CHRONIC OBSTRUCTIVE PULMONARY DISEASE) (MULTI): Status: ACTIVE | Noted: 2023-08-02

## 2023-08-02 PROBLEM — K29.20 ALCOHOLIC GASTRITIS WITHOUT BLEEDING: Status: RESOLVED | Noted: 2023-08-02 | Resolved: 2023-08-02

## 2023-08-02 PROBLEM — Z98.84 HISTORY OF GASTRIC RESTRICTIVE SURGERY: Status: ACTIVE | Noted: 2023-08-02

## 2023-08-02 PROBLEM — K29.20 ALCOHOLIC GASTRITIS WITHOUT BLEEDING: Status: ACTIVE | Noted: 2023-08-02

## 2023-08-03 ENCOUNTER — TELEPHONE (OUTPATIENT)
Dept: PRIMARY CARE | Facility: CLINIC | Age: 63
End: 2023-08-03
Payer: MEDICARE

## 2023-08-03 DIAGNOSIS — M79.671 PAIN IN BOTH FEET: ICD-10-CM

## 2023-08-03 DIAGNOSIS — M79.672 PAIN IN BOTH FEET: ICD-10-CM

## 2023-08-03 NOTE — TELEPHONE ENCOUNTER
Pt had previous referral for Podiatry, pt seen Dr Crook and would like a second opinion. Please create new Podiatry referral so we can assist pt on scheduling. Pt's # [phone]

## 2023-08-23 ENCOUNTER — OFFICE VISIT (OUTPATIENT)
Dept: PRIMARY CARE | Facility: CLINIC | Age: 63
End: 2023-08-23
Payer: MEDICARE

## 2023-08-23 VITALS
BODY MASS INDEX: 26.24 KG/M2 | TEMPERATURE: 98.4 F | RESPIRATION RATE: 16 BRPM | HEART RATE: 74 BPM | OXYGEN SATURATION: 98 % | SYSTOLIC BLOOD PRESSURE: 130 MMHG | DIASTOLIC BLOOD PRESSURE: 78 MMHG | HEIGHT: 58 IN | WEIGHT: 125 LBS

## 2023-08-23 DIAGNOSIS — J44.9 CHRONIC OBSTRUCTIVE PULMONARY DISEASE, UNSPECIFIED COPD TYPE (MULTI): ICD-10-CM

## 2023-08-23 DIAGNOSIS — Z00.00 ENCOUNTER FOR MEDICARE ANNUAL WELLNESS EXAM: ICD-10-CM

## 2023-08-23 DIAGNOSIS — M79.671 PAIN IN BOTH FEET: ICD-10-CM

## 2023-08-23 DIAGNOSIS — R32 URINARY INCONTINENCE, UNSPECIFIED TYPE: ICD-10-CM

## 2023-08-23 DIAGNOSIS — R73.9 HYPERGLYCEMIA: ICD-10-CM

## 2023-08-23 DIAGNOSIS — Z98.84 HISTORY OF GASTRIC RESTRICTIVE SURGERY: ICD-10-CM

## 2023-08-23 DIAGNOSIS — Z00.00 ROUTINE GENERAL MEDICAL EXAMINATION AT HEALTH CARE FACILITY: Primary | ICD-10-CM

## 2023-08-23 DIAGNOSIS — F10.21 HISTORY OF ALCOHOLISM (MULTI): ICD-10-CM

## 2023-08-23 DIAGNOSIS — E78.5 HYPERLIPIDEMIA, UNSPECIFIED HYPERLIPIDEMIA TYPE: ICD-10-CM

## 2023-08-23 DIAGNOSIS — C50.412 MALIGNANT NEOPLASM OF UPPER-OUTER QUADRANT OF LEFT FEMALE BREAST, UNSPECIFIED ESTROGEN RECEPTOR STATUS (MULTI): ICD-10-CM

## 2023-08-23 DIAGNOSIS — F33.9 DEPRESSION, RECURRENT (CMS-HCC): ICD-10-CM

## 2023-08-23 DIAGNOSIS — I10 ESSENTIAL HYPERTENSION: ICD-10-CM

## 2023-08-23 DIAGNOSIS — M79.672 PAIN IN BOTH FEET: ICD-10-CM

## 2023-08-23 DIAGNOSIS — M21.619 BUNION: ICD-10-CM

## 2023-08-23 DIAGNOSIS — N39.498 OTHER URINARY INCONTINENCE: ICD-10-CM

## 2023-08-23 PROCEDURE — G0439 PPPS, SUBSEQ VISIT: HCPCS | Performed by: FAMILY MEDICINE

## 2023-08-23 PROCEDURE — 3078F DIAST BP <80 MM HG: CPT | Performed by: FAMILY MEDICINE

## 2023-08-23 PROCEDURE — 99213 OFFICE O/P EST LOW 20 MIN: CPT | Performed by: FAMILY MEDICINE

## 2023-08-23 PROCEDURE — 3075F SYST BP GE 130 - 139MM HG: CPT | Performed by: FAMILY MEDICINE

## 2023-08-23 RX ORDER — TOLTERODINE 4 MG/1
4 CAPSULE, EXTENDED RELEASE ORAL DAILY
Qty: 30 CAPSULE | Refills: 5 | Status: SHIPPED | OUTPATIENT
Start: 2023-08-23 | End: 2024-02-19

## 2023-08-23 RX ORDER — PROCHLORPERAZINE MALEATE 10 MG
10 TABLET ORAL EVERY 6 HOURS PRN
COMMUNITY
Start: 2022-09-28

## 2023-08-23 RX ORDER — FUROSEMIDE 20 MG/1
TABLET ORAL
COMMUNITY
Start: 2023-02-21

## 2023-08-23 ASSESSMENT — ENCOUNTER SYMPTOMS
OCCASIONAL FEELINGS OF UNSTEADINESS: 1
DEPRESSION: 1
LOSS OF SENSATION IN FEET: 0

## 2023-08-23 ASSESSMENT — ACTIVITIES OF DAILY LIVING (ADL)
DOING_HOUSEWORK: INDEPENDENT
MANAGING_FINANCES: INDEPENDENT
DRESSING: INDEPENDENT
GROCERY_SHOPPING: INDEPENDENT
TAKING_MEDICATION: INDEPENDENT
BATHING: INDEPENDENT

## 2023-08-23 ASSESSMENT — PATIENT HEALTH QUESTIONNAIRE - PHQ9
8. MOVING OR SPEAKING SO SLOWLY THAT OTHER PEOPLE COULD HAVE NOTICED. OR THE OPPOSITE, BEING SO FIGETY OR RESTLESS THAT YOU HAVE BEEN MOVING AROUND A LOT MORE THAN USUAL: SEVERAL DAYS
10. IF YOU CHECKED OFF ANY PROBLEMS, HOW DIFFICULT HAVE THESE PROBLEMS MADE IT FOR YOU TO DO YOUR WORK, TAKE CARE OF THINGS AT HOME, OR GET ALONG WITH OTHER PEOPLE: SOMEWHAT DIFFICULT
1. LITTLE INTEREST OR PLEASURE IN DOING THINGS: MORE THAN HALF THE DAYS
6. FEELING BAD ABOUT YOURSELF - OR THAT YOU ARE A FAILURE OR HAVE LET YOURSELF OR YOUR FAMILY DOWN: MORE THAN HALF THE DAYS
SUM OF ALL RESPONSES TO PHQ9 QUESTIONS 1 AND 2: 4
9. THOUGHTS THAT YOU WOULD BE BETTER OFF DEAD, OR OF HURTING YOURSELF: NOT AT ALL
3. TROUBLE FALLING OR STAYING ASLEEP OR SLEEPING TOO MUCH: NEARLY EVERY DAY
SUM OF ALL RESPONSES TO PHQ QUESTIONS 1-9: 15
5. POOR APPETITE OR OVEREATING: MORE THAN HALF THE DAYS
7. TROUBLE CONCENTRATING ON THINGS, SUCH AS READING THE NEWSPAPER OR WATCHING TELEVISION: SEVERAL DAYS
2. FEELING DOWN, DEPRESSED OR HOPELESS: MORE THAN HALF THE DAYS
4. FEELING TIRED OR HAVING LITTLE ENERGY: MORE THAN HALF THE DAYS

## 2023-08-23 NOTE — PROGRESS NOTES
Subjective   Reason for Visit: Antonieta Boswell is a 63 y.o. female here for a Medicare Wellness  C19: [id number]  Pap: due?  Mamm: UTD  ColoRect: due?  Steady done  Still admits to DU  No etoh      CHECKLIST REVIEWED AND COMPLETE FOR AMW    Past Medical, Surgical, and Family History reviewed and updated in chart.    Reviewed all medications by prescribing practitioner or clinical pharmacist (such as prescriptions, OTCs, herbal therapies and supplements) and documented in the medical record.  Medicare Annual Wellness Visit Subsequent and Hearing Problem (Pt said she needs to see [audiologist] for constant ringing in ears)  HPI    Patient Self Assessment of Health Status  Patient Self Assessment: Good    Nutrition and Exercise  Current Diet: HEALTHY Diet always best, minimizing excess carbs  Adequate Fluid Intake: Yes  Caffeine: -aware to minimize intake  Exercise Frequency: Regularly advised    Functional Ability/Level of Safety  Cognitive Impairment Observed: No cognitive impairment observed    Home Safety Risk Factors: None    Patient Care Team:  Carole Davis MD as PCP - General    HPI  Patient Active Problem List   Diagnosis    COPD (chronic obstructive pulmonary disease) (CMS/HCC)    Essential hypertension    History of alcoholism (CMS/HCC)    History of gastric restrictive surgery    Hyperlipidemia, unspecified    Malignant neoplasm of upper-outer quadrant of left female breast (CMS/HCC)    Urinary incontinence    Methamphetamine use (CMS/HCC)      Past Surgical History:   Procedure Laterality Date    BARIATRIC SURGERY  2000    CHOLECYSTECTOMY  2010    MASTECTOMY, PARTIAL  2022    TONSILLECTOMY  1964    with adenoidectomy    TOTAL HIP ARTHROPLASTY Left 2009       Review of Systems  This patient has   NO history of recent Covid nor flu symptoms,  NO Fever nor chills,  NO Chest pain, shortness of breath nor paroxysmal nocturnal dyspnea,  NO Nausea, vomiting, nor diarrhea,  NO Hematochezia nor melena,  NO  "Dysuria, hematuria, nor new incontinence issues  NO new severe headaches nor neurological complaints,  NO new issues with anxiety nor depression nor new psychiatric complaints,  NO suicidal nor homicidal ideations.     OBJECTIVE:  /76 (BP Location: Right arm, Patient Position: Sitting, BP Cuff Size: Adult)   Pulse 74   Temp 36.9 °C (98.4 °F) (Temporal)   Resp 16   Ht 1.473 m (4' 10\")   Wt 56.7 kg (125 lb)   SpO2 98%   BMI 26.13 kg/m²      General:  alert, oriented, no acute distress.  No obvious skin rashes noted.   No gait disturbance noted.    Mood is pleasant, not tearful, no signs of emotional distress.  Not appearing intoxicated or altered.   No voiced delusions,   Normal, appropriate behavior.    HEENT: Normocephalic, atraumatic,   Pupils round, reactive to light  Extraocular motions intact and wnl  Tympanic membranes normal    Neck: no nuchal rigidity  No masses palpable.  No carotid bruits.  No thyromegaly.    Respiratory: Equal breath sounds  No wheezes,    rales,    nor rhonchi  No respiratory distress.    Heart: Regular rate and rhythm, no    murmurs  no rubs/gallops    Abdomen: no masses palpable, no rebound nor guarding, no rebound nor guarding.    Extremities: NO cyanosis noted, no clubbing.   No edema noted.  2+dorsalis pedis pulses.    Normal-not antalgic, steady gait.    No visits with results within 3 Month(s) from this visit.   Latest known visit with results is:   Legacy Encounter on 01/03/2023   Component Date Value Ref Range Status    Color, Urine 01/04/2023 YELLOW  STRAW,YELLOW Final    Appearance, Urine 01/04/2023 HAZY  CLEAR Final    Specific Gravity, Urine 01/04/2023 1.015  1.005 - 1.035 Final    pH, Urine 01/04/2023 6.0  5.0 - 8.0 Final    Protein, Urine 01/04/2023 NEGATIVE  NEGATIVE mg/dL Final    Glucose, Urine 01/04/2023 NEGATIVE  NEGATIVE mg/dL Final    Blood, Urine 01/04/2023 NEGATIVE  NEGATIVE Final    Ketones, Urine 01/04/2023 NEGATIVE  NEGATIVE mg/dL Final    " Bilirubin, Urine 01/04/2023 NEGATIVE  NEGATIVE Final    Urobilinogen, Urine 01/04/2023 2.0 (H)  0.0 - 1.9 mg/dL Final    Comment: Due to a manufacturing issue, low positive urobilinogen results may be   falsely positive.  Correlate with urine bilirubin and additional   clinical/laboratory findings to assess the risk of hemolytic anemia or liver   disease. If clinically indicated, repeat testing with an alternate method is   available by contacting the laboratory within 24 hours.  .  Some pigments and medications may cause a false positive urobilinogen.      Nitrite, Urine 01/04/2023 NEGATIVE  NEGATIVE Final    Leukocyte Esterase, Urine 01/04/2023 NEGATIVE  NEGATIVE Final        Assessment/Plan     Problem List Items Addressed This Visit       COPD (chronic obstructive pulmonary disease) (CMS/[facility])    Essential hypertension    History of gastric restrictive surgery    Hyperlipidemia, unspecified    Malignant neoplasm of upper-outer quadrant of left female breast (CMS/[facility])    Urinary incontinence     Other Visit Diagnoses       Encounter for Medicare annual wellness exam              Advance Care Planning Note   Discussion Date: 08/23/23   Discussion Participants: patient    The patient wishes to discuss Advance Care Planning today and the following is a brief summary of our discussion.     Patient has capacity to make their own medical decisions: Yes  Health Care Agent/Surrogate Decision Maker documented in chart: Yes  Documents on file and valid:  Advance Directive/Living Will: no  Health Care Power of : no  Communication of Medical Status/Prognosis:   yes   Communication of Treatment Goals/Options:   yes  Treatment Decisions  yes  Time Statement: Total face to face time spent on advance care planning was <30 minutes with <30 minutes spent in counseling, including the explanation.  Encouraged healthier living    SEE ME AT NEXT REGULARLY SCHEDULED VISIT-sooner if condition deteriorates or new problems  arise.  Still w some depression  No hi/si  No dementia    Labs due-agrees to do soon  The patient is aware that results will be forthcoming of ALL planned labs and or tests. If no results are received on my chart or by letter within 1 - 3 weeks, the patient is aware they need to call to obtain results, as this is not usual. Also, if any new conditions arise, or current condition worsens, it is understood that sooner appointment should be made or urgent care/convenient care or emergency room treatment should be sought depending on severity. Otherwise follow up for recheck at regular intervals as we have discussed, at least yearly.    Declines aaa screen or lung ct    Reqs detrol This medications risks, benefits, and alternatives were discussed with patient at length.  If any unwanted side effects occur-discontinue medicine and call the office for discussion.  Declines ua  Dc smoking advised

## 2023-08-23 NOTE — PATIENT INSTRUCTIONS

## 2023-08-29 ENCOUNTER — LAB (OUTPATIENT)
Dept: LAB | Facility: LAB | Age: 63
End: 2023-08-29
Payer: MEDICARE

## 2023-08-29 DIAGNOSIS — C50.412 MALIGNANT NEOPLASM OF UPPER-OUTER QUADRANT OF LEFT FEMALE BREAST, UNSPECIFIED ESTROGEN RECEPTOR STATUS (MULTI): ICD-10-CM

## 2023-08-29 DIAGNOSIS — I10 ESSENTIAL HYPERTENSION: ICD-10-CM

## 2023-08-29 DIAGNOSIS — Z00.00 ENCOUNTER FOR MEDICARE ANNUAL WELLNESS EXAM: ICD-10-CM

## 2023-08-29 DIAGNOSIS — R73.9 HYPERGLYCEMIA: ICD-10-CM

## 2023-08-29 DIAGNOSIS — E78.5 HYPERLIPIDEMIA, UNSPECIFIED HYPERLIPIDEMIA TYPE: ICD-10-CM

## 2023-08-29 DIAGNOSIS — Z98.84 HISTORY OF GASTRIC RESTRICTIVE SURGERY: ICD-10-CM

## 2023-08-29 DIAGNOSIS — Z00.00 ROUTINE GENERAL MEDICAL EXAMINATION AT HEALTH CARE FACILITY: ICD-10-CM

## 2023-08-29 DIAGNOSIS — J44.9 CHRONIC OBSTRUCTIVE PULMONARY DISEASE, UNSPECIFIED COPD TYPE (MULTI): ICD-10-CM

## 2023-08-29 DIAGNOSIS — R32 URINARY INCONTINENCE, UNSPECIFIED TYPE: ICD-10-CM

## 2023-08-29 LAB
ALANINE AMINOTRANSFERASE (SGPT) (U/L) IN SER/PLAS: 13 U/L (ref 7–45)
ALBUMIN (G/DL) IN SER/PLAS: 4.2 G/DL (ref 3.4–5)
ALKALINE PHOSPHATASE (U/L) IN SER/PLAS: 92 U/L (ref 33–136)
ANION GAP IN SER/PLAS: 11 MMOL/L (ref 10–20)
ASPARTATE AMINOTRANSFERASE (SGOT) (U/L) IN SER/PLAS: 20 U/L (ref 9–39)
BASOPHILS (10*3/UL) IN BLOOD BY AUTOMATED COUNT: 0.03 X10E9/L (ref 0–0.1)
BASOPHILS/100 LEUKOCYTES IN BLOOD BY AUTOMATED COUNT: 0.8 % (ref 0–2)
BILIRUBIN TOTAL (MG/DL) IN SER/PLAS: 0.3 MG/DL (ref 0–1.2)
CALCIUM (MG/DL) IN SER/PLAS: 9.6 MG/DL (ref 8.6–10.3)
CARBON DIOXIDE, TOTAL (MMOL/L) IN SER/PLAS: 28 MMOL/L (ref 21–32)
CHLORIDE (MMOL/L) IN SER/PLAS: 108 MMOL/L (ref 98–107)
CHOLESTEROL (MG/DL) IN SER/PLAS: 162 MG/DL (ref 0–199)
CHOLESTEROL IN HDL (MG/DL) IN SER/PLAS: 82.3 MG/DL
CHOLESTEROL/HDL RATIO: 2
COBALAMIN (VITAMIN B12) (PG/ML) IN SER/PLAS: 164 PG/ML (ref 211–911)
CREATININE (MG/DL) IN SER/PLAS: 0.94 MG/DL (ref 0.5–1.05)
EOSINOPHILS (10*3/UL) IN BLOOD BY AUTOMATED COUNT: 0.13 X10E9/L (ref 0–0.7)
EOSINOPHILS/100 LEUKOCYTES IN BLOOD BY AUTOMATED COUNT: 3.6 % (ref 0–6)
ERYTHROCYTE DISTRIBUTION WIDTH (RATIO) BY AUTOMATED COUNT: 14.6 % (ref 11.5–14.5)
ERYTHROCYTE MEAN CORPUSCULAR HEMOGLOBIN CONCENTRATION (G/DL) BY AUTOMATED: 31.1 G/DL (ref 32–36)
ERYTHROCYTE MEAN CORPUSCULAR VOLUME (FL) BY AUTOMATED COUNT: 91 FL (ref 80–100)
ERYTHROCYTES (10*6/UL) IN BLOOD BY AUTOMATED COUNT: 4.65 X10E12/L (ref 4–5.2)
GFR FEMALE: 68 ML/MIN/1.73M2
GLUCOSE (MG/DL) IN SER/PLAS: 80 MG/DL (ref 74–99)
HEMATOCRIT (%) IN BLOOD BY AUTOMATED COUNT: 42.5 % (ref 36–46)
HEMOGLOBIN (G/DL) IN BLOOD: 13.2 G/DL (ref 12–16)
IMMATURE GRANULOCYTES/100 LEUKOCYTES IN BLOOD BY AUTOMATED COUNT: 0.3 % (ref 0–0.9)
LDL: 65 MG/DL (ref 0–99)
LEUKOCYTES (10*3/UL) IN BLOOD BY AUTOMATED COUNT: 3.6 X10E9/L (ref 4.4–11.3)
LYMPHOCYTES (10*3/UL) IN BLOOD BY AUTOMATED COUNT: 1.35 X10E9/L (ref 1.2–4.8)
LYMPHOCYTES/100 LEUKOCYTES IN BLOOD BY AUTOMATED COUNT: 37.3 % (ref 13–44)
MONOCYTES (10*3/UL) IN BLOOD BY AUTOMATED COUNT: 0.29 X10E9/L (ref 0.1–1)
MONOCYTES/100 LEUKOCYTES IN BLOOD BY AUTOMATED COUNT: 8 % (ref 2–10)
NEUTROPHILS (10*3/UL) IN BLOOD BY AUTOMATED COUNT: 1.81 X10E9/L (ref 1.2–7.7)
NEUTROPHILS/100 LEUKOCYTES IN BLOOD BY AUTOMATED COUNT: 50 % (ref 40–80)
PLATELETS (10*3/UL) IN BLOOD AUTOMATED COUNT: 190 X10E9/L (ref 150–450)
POTASSIUM (MMOL/L) IN SER/PLAS: 4 MMOL/L (ref 3.5–5.3)
PROTEIN TOTAL: 6.7 G/DL (ref 6.4–8.2)
SODIUM (MMOL/L) IN SER/PLAS: 143 MMOL/L (ref 136–145)
THYROTROPIN (MIU/L) IN SER/PLAS BY DETECTION LIMIT <= 0.05 MIU/L: 3.59 MIU/L (ref 0.44–3.98)
THYROXINE (T4) FREE (NG/DL) IN SER/PLAS: 0.89 NG/DL (ref 0.61–1.12)
TRIGLYCERIDE (MG/DL) IN SER/PLAS: 75 MG/DL (ref 0–149)
UREA NITROGEN (MG/DL) IN SER/PLAS: 23 MG/DL (ref 6–23)
VLDL: 15 MG/DL (ref 0–40)

## 2023-08-29 PROCEDURE — 84425 ASSAY OF VITAMIN B-1: CPT

## 2023-08-29 PROCEDURE — 82728 ASSAY OF FERRITIN: CPT

## 2023-08-29 PROCEDURE — 80053 COMPREHEN METABOLIC PANEL: CPT

## 2023-08-29 PROCEDURE — 82652 VIT D 1 25-DIHYDROXY: CPT

## 2023-08-29 PROCEDURE — 80061 LIPID PANEL: CPT

## 2023-08-29 PROCEDURE — 84630 ASSAY OF ZINC: CPT

## 2023-08-29 PROCEDURE — 84439 ASSAY OF FREE THYROXINE: CPT

## 2023-08-29 PROCEDURE — 83036 HEMOGLOBIN GLYCOSYLATED A1C: CPT

## 2023-08-29 PROCEDURE — 36415 COLL VENOUS BLD VENIPUNCTURE: CPT

## 2023-08-29 PROCEDURE — 84443 ASSAY THYROID STIM HORMONE: CPT

## 2023-08-29 PROCEDURE — 82607 VITAMIN B-12: CPT

## 2023-08-29 PROCEDURE — 85025 COMPLETE CBC W/AUTO DIFF WBC: CPT

## 2023-08-30 LAB
ESTIMATED AVERAGE GLUCOSE FOR HBA1C: 100 MG/DL
FERRITIN (UG/LL) IN SER/PLAS: 11 UG/L (ref 8–150)
HEMOGLOBIN A1C/HEMOGLOBIN TOTAL IN BLOOD: 5.1 %

## 2023-09-01 LAB
VITAMIN D 1,25-DIHYDROXY: 53.5 PG/ML (ref 19.9–79.3)
ZINC,SERUM OR PLASMA: 68.2 UG/DL (ref 60–120)

## 2023-09-04 LAB — VITAMIN B1, WHOLE BLOOD: 184 NMOL/L (ref 70–180)

## 2024-06-10 ENCOUNTER — APPOINTMENT (OUTPATIENT)
Dept: RADIOLOGY | Facility: HOSPITAL | Age: 64
End: 2024-06-10
Payer: MEDICARE

## 2024-07-30 ENCOUNTER — HOSPITAL ENCOUNTER (OUTPATIENT)
Dept: RADIOLOGY | Facility: CLINIC | Age: 64
Discharge: HOME | End: 2024-07-30
Payer: MEDICARE

## 2024-07-30 DIAGNOSIS — M79.671 PAIN IN RIGHT FOOT: ICD-10-CM

## 2024-07-30 DIAGNOSIS — M25.571 PAIN IN RIGHT ANKLE AND JOINTS OF RIGHT FOOT: ICD-10-CM

## 2024-07-30 PROCEDURE — 73630 X-RAY EXAM OF FOOT: CPT | Mod: RIGHT SIDE | Performed by: RADIOLOGY

## 2024-07-30 PROCEDURE — 73630 X-RAY EXAM OF FOOT: CPT | Mod: RT

## 2024-07-30 PROCEDURE — 73610 X-RAY EXAM OF ANKLE: CPT | Mod: RT

## 2024-07-30 PROCEDURE — 73610 X-RAY EXAM OF ANKLE: CPT | Mod: RIGHT SIDE | Performed by: RADIOLOGY

## 2024-08-03 ENCOUNTER — HOSPITAL ENCOUNTER (OUTPATIENT)
Dept: RADIOLOGY | Facility: HOSPITAL | Age: 64
Discharge: HOME | End: 2024-08-03
Payer: MEDICARE

## 2024-08-03 VITALS — WEIGHT: 130 LBS | HEIGHT: 58 IN | BODY MASS INDEX: 27.29 KG/M2

## 2024-08-03 DIAGNOSIS — Z12.31 ENCOUNTER FOR SCREENING MAMMOGRAM FOR MALIGNANT NEOPLASM OF BREAST: ICD-10-CM

## 2024-08-03 PROCEDURE — 77067 SCR MAMMO BI INCL CAD: CPT | Performed by: RADIOLOGY

## 2024-08-03 PROCEDURE — 77067 SCR MAMMO BI INCL CAD: CPT

## 2024-08-03 PROCEDURE — 77063 BREAST TOMOSYNTHESIS BI: CPT | Performed by: RADIOLOGY

## 2024-08-13 ENCOUNTER — APPOINTMENT (OUTPATIENT)
Dept: PRIMARY CARE | Facility: CLINIC | Age: 64
End: 2024-08-13
Payer: MEDICARE

## 2024-08-21 ENCOUNTER — APPOINTMENT (OUTPATIENT)
Dept: PRIMARY CARE | Facility: CLINIC | Age: 64
End: 2024-08-21
Payer: MEDICARE

## 2024-08-21 VITALS
HEART RATE: 96 BPM | SYSTOLIC BLOOD PRESSURE: 170 MMHG | WEIGHT: 128 LBS | DIASTOLIC BLOOD PRESSURE: 100 MMHG | OXYGEN SATURATION: 97 % | BODY MASS INDEX: 26.76 KG/M2 | RESPIRATION RATE: 18 BRPM | TEMPERATURE: 98 F

## 2024-08-21 DIAGNOSIS — G89.29 CHRONIC BILATERAL LOW BACK PAIN WITH BILATERAL SCIATICA: ICD-10-CM

## 2024-08-21 DIAGNOSIS — M54.41 CHRONIC BILATERAL LOW BACK PAIN WITH BILATERAL SCIATICA: ICD-10-CM

## 2024-08-21 DIAGNOSIS — S81.801A WOUND OF RIGHT LOWER EXTREMITY, INITIAL ENCOUNTER: Primary | ICD-10-CM

## 2024-08-21 DIAGNOSIS — M54.42 CHRONIC BILATERAL LOW BACK PAIN WITH BILATERAL SCIATICA: ICD-10-CM

## 2024-08-21 DIAGNOSIS — G89.4 CHRONIC PAIN SYNDROME: ICD-10-CM

## 2024-08-21 PROCEDURE — 3080F DIAST BP >= 90 MM HG: CPT | Performed by: PHYSICIAN ASSISTANT

## 2024-08-21 PROCEDURE — 99214 OFFICE O/P EST MOD 30 MIN: CPT | Performed by: PHYSICIAN ASSISTANT

## 2024-08-21 PROCEDURE — 1159F MED LIST DOCD IN RCRD: CPT | Performed by: PHYSICIAN ASSISTANT

## 2024-08-21 PROCEDURE — 3077F SYST BP >= 140 MM HG: CPT | Performed by: PHYSICIAN ASSISTANT

## 2024-08-21 RX ORDER — MUPIROCIN 20 MG/G
OINTMENT TOPICAL
Qty: 30 G | Refills: 0 | Status: SHIPPED | OUTPATIENT
Start: 2024-08-21 | End: 2024-08-28

## 2024-08-21 RX ORDER — SULFAMETHOXAZOLE AND TRIMETHOPRIM 800; 160 MG/1; MG/1
TABLET ORAL
COMMUNITY
Start: 2024-07-30

## 2024-08-21 ASSESSMENT — ENCOUNTER SYMPTOMS: WOUND: 1

## 2024-08-21 NOTE — PROGRESS NOTES
Subjective   Patient ID: Antonieta Boswell is a 64 y.o. female who presents for Leg Injury (Dr Davis pt here today for a right lower leg wound 3 weeks ago; had shot vac bucket and scrapped leg and getting worse. Pt went to urgent care [facility] in July 30th and they gave her an antibiotic Azithromycin-finsihed and Bactrim DS which she has 1 pill left. ).    HPI     Was walking with a bucket and it hit her right leg and now painful and draining. Started off as a scratch and now getting holes     Review of Systems   Skin:  Positive for wound (right lower leg).       Objective   BP (!) 170/100   Pulse 96   Temp 36.7 °C (98 °F)   Resp 18   Wt 58.1 kg (128 lb)   SpO2 97%   BMI 26.76 kg/m²     Physical Exam  Musculoskeletal:      Comments: Walking hunched over, significant kyphosis    Skin:     Findings: Wound (right lower leg, small 1cm wound, it is mildly tender, not erythematous, no purulence or warmth) present.         Assessment/Plan     Problem List Items Addressed This Visit    None  Visit Diagnoses       Wound of right lower extremity, initial encounter    -  Primary    Relevant Medications    mupirocin (Bactroban) 2 % ointment    Chronic pain syndrome        Chronic bilateral low back pain with bilateral sciatica        Relevant Orders    Referral to Pain Medicine            Left leg wound:   - Some granulation tissue present, appears to be healing ok without signs of obvious infection  - It is a dirty wound based on MOA - rx sent for mupirocin cream to apply   - Wound was dressed today in non-stick dressing   - Advised she dress it to protect from dirt but leave open to air out when able   - Follow up if sxs worsen, persist or any new concerns - encouraged to take pictures to track progress     Pt also c/o chronic back pain, has severe kyphosis  - Referred to pain mgmt for workup and tx

## 2024-09-03 ENCOUNTER — TELEPHONE (OUTPATIENT)
Dept: PAIN MEDICINE | Facility: CLINIC | Age: 64
End: 2024-09-03
Payer: MEDICARE

## 2024-10-04 ENCOUNTER — APPOINTMENT (OUTPATIENT)
Dept: GENERAL RADIOLOGY | Age: 64
End: 2024-10-04
Payer: MEDICARE

## 2024-10-04 ENCOUNTER — APPOINTMENT (OUTPATIENT)
Dept: CT IMAGING | Age: 64
End: 2024-10-04
Payer: MEDICARE

## 2024-10-04 ENCOUNTER — HOSPITAL ENCOUNTER (EMERGENCY)
Age: 64
Discharge: ANOTHER HOSPITAL | End: 2024-10-04
Payer: MEDICARE

## 2024-10-04 VITALS
SYSTOLIC BLOOD PRESSURE: 137 MMHG | TEMPERATURE: 98.1 F | HEART RATE: 73 BPM | BODY MASS INDEX: 27.08 KG/M2 | WEIGHT: 129 LBS | HEIGHT: 58 IN | OXYGEN SATURATION: 95 % | DIASTOLIC BLOOD PRESSURE: 73 MMHG | RESPIRATION RATE: 16 BRPM

## 2024-10-04 DIAGNOSIS — S22.31XA CLOSED FRACTURE OF ONE RIB OF RIGHT SIDE, INITIAL ENCOUNTER: ICD-10-CM

## 2024-10-04 DIAGNOSIS — S72.002A CLOSED LEFT HIP FRACTURE, INITIAL ENCOUNTER (HCC): Primary | ICD-10-CM

## 2024-10-04 LAB
ALBUMIN SERPL-MCNC: 3.9 G/DL (ref 3.5–4.6)
ALP SERPL-CCNC: 109 U/L (ref 40–130)
ALT SERPL-CCNC: 22 U/L (ref 0–33)
ANION GAP SERPL CALCULATED.3IONS-SCNC: 7 MEQ/L (ref 9–15)
APTT PPP: 28.6 SEC (ref 24.4–36.8)
AST SERPL-CCNC: 30 U/L (ref 0–35)
BASOPHILS # BLD: 0 K/UL (ref 0–0.2)
BASOPHILS NFR BLD: 0.9 %
BILIRUB SERPL-MCNC: 0.3 MG/DL (ref 0.2–0.7)
BUN SERPL-MCNC: 19 MG/DL (ref 8–23)
CALCIUM SERPL-MCNC: 9.8 MG/DL (ref 8.5–9.9)
CHLORIDE SERPL-SCNC: 106 MEQ/L (ref 95–107)
CO2 SERPL-SCNC: 31 MEQ/L (ref 20–31)
CREAT SERPL-MCNC: 0.77 MG/DL (ref 0.5–0.9)
EOSINOPHIL # BLD: 0.1 K/UL (ref 0–0.7)
EOSINOPHIL NFR BLD: 1.8 %
ERYTHROCYTE [DISTWIDTH] IN BLOOD BY AUTOMATED COUNT: 14.2 % (ref 11.5–14.5)
ETHANOL PERCENT: NORMAL G/DL
ETHANOLAMINE SERPL-MCNC: <10 MG/DL (ref 0–0.08)
GLOBULIN SER CALC-MCNC: 2.9 G/DL (ref 2.3–3.5)
GLUCOSE SERPL-MCNC: 100 MG/DL (ref 70–99)
HCT VFR BLD AUTO: 45.4 % (ref 37–47)
HGB BLD-MCNC: 14.2 G/DL (ref 12–16)
INR PPP: 1
LYMPHOCYTES # BLD: 1 K/UL (ref 1–4.8)
LYMPHOCYTES NFR BLD: 22.6 %
MCH RBC QN AUTO: 29 PG (ref 27–31.3)
MCHC RBC AUTO-ENTMCNC: 31.3 % (ref 33–37)
MCV RBC AUTO: 92.7 FL (ref 79.4–94.8)
MONOCYTES # BLD: 0.3 K/UL (ref 0.2–0.8)
MONOCYTES NFR BLD: 7.1 %
NEUTROPHILS # BLD: 2.9 K/UL (ref 1.4–6.5)
NEUTS SEG NFR BLD: 67.1 %
PLATELET # BLD AUTO: 208 K/UL (ref 130–400)
POTASSIUM SERPL-SCNC: 4.4 MEQ/L (ref 3.4–4.9)
PROT SERPL-MCNC: 6.8 G/DL (ref 6.3–8)
PROTHROMBIN TIME: 13.2 SEC (ref 12.3–14.9)
RBC # BLD AUTO: 4.9 M/UL (ref 4.2–5.4)
SODIUM SERPL-SCNC: 144 MEQ/L (ref 135–144)
WBC # BLD AUTO: 4.3 K/UL (ref 4.8–10.8)

## 2024-10-04 PROCEDURE — 6360000002 HC RX W HCPCS: Performed by: PHYSICIAN ASSISTANT

## 2024-10-04 PROCEDURE — 73552 X-RAY EXAM OF FEMUR 2/>: CPT

## 2024-10-04 PROCEDURE — 72170 X-RAY EXAM OF PELVIS: CPT

## 2024-10-04 PROCEDURE — 96374 THER/PROPH/DIAG INJ IV PUSH: CPT

## 2024-10-04 PROCEDURE — 82077 ASSAY SPEC XCP UR&BREATH IA: CPT

## 2024-10-04 PROCEDURE — 73700 CT LOWER EXTREMITY W/O DYE: CPT

## 2024-10-04 PROCEDURE — 85730 THROMBOPLASTIN TIME PARTIAL: CPT

## 2024-10-04 PROCEDURE — 70450 CT HEAD/BRAIN W/O DYE: CPT

## 2024-10-04 PROCEDURE — 96375 TX/PRO/DX INJ NEW DRUG ADDON: CPT

## 2024-10-04 PROCEDURE — 85025 COMPLETE CBC W/AUTO DIFF WBC: CPT

## 2024-10-04 PROCEDURE — 80053 COMPREHEN METABOLIC PANEL: CPT

## 2024-10-04 PROCEDURE — 72125 CT NECK SPINE W/O DYE: CPT

## 2024-10-04 PROCEDURE — 96376 TX/PRO/DX INJ SAME DRUG ADON: CPT

## 2024-10-04 PROCEDURE — 85610 PROTHROMBIN TIME: CPT

## 2024-10-04 PROCEDURE — 71250 CT THORAX DX C-: CPT

## 2024-10-04 PROCEDURE — 99285 EMERGENCY DEPT VISIT HI MDM: CPT

## 2024-10-04 RX ORDER — ONDANSETRON 2 MG/ML
4 INJECTION INTRAMUSCULAR; INTRAVENOUS ONCE
Status: COMPLETED | OUTPATIENT
Start: 2024-10-04 | End: 2024-10-04

## 2024-10-04 RX ORDER — FENTANYL CITRATE 0.05 MG/ML
50 INJECTION, SOLUTION INTRAMUSCULAR; INTRAVENOUS ONCE
Status: COMPLETED | OUTPATIENT
Start: 2024-10-04 | End: 2024-10-04

## 2024-10-04 RX ORDER — HYDRALAZINE HYDROCHLORIDE 20 MG/ML
10 INJECTION INTRAMUSCULAR; INTRAVENOUS ONCE
Status: COMPLETED | OUTPATIENT
Start: 2024-10-04 | End: 2024-10-04

## 2024-10-04 RX ORDER — MORPHINE SULFATE 4 MG/ML
4 INJECTION, SOLUTION INTRAMUSCULAR; INTRAVENOUS ONCE
Status: COMPLETED | OUTPATIENT
Start: 2024-10-04 | End: 2024-10-04

## 2024-10-04 RX ADMIN — FENTANYL CITRATE 50 MCG: 0.05 INJECTION, SOLUTION INTRAMUSCULAR; INTRAVENOUS at 14:52

## 2024-10-04 RX ADMIN — ONDANSETRON 4 MG: 2 INJECTION, SOLUTION INTRAMUSCULAR; INTRAVENOUS at 12:51

## 2024-10-04 RX ADMIN — FENTANYL CITRATE 50 MCG: 0.05 INJECTION, SOLUTION INTRAMUSCULAR; INTRAVENOUS at 13:47

## 2024-10-04 RX ADMIN — MORPHINE SULFATE 4 MG: 4 INJECTION, SOLUTION INTRAMUSCULAR; INTRAVENOUS at 12:52

## 2024-10-04 RX ADMIN — HYDRALAZINE HYDROCHLORIDE 10 MG: 20 INJECTION INTRAMUSCULAR; INTRAVENOUS at 14:52

## 2024-10-04 RX ADMIN — FENTANYL CITRATE 50 MCG: 0.05 INJECTION, SOLUTION INTRAMUSCULAR; INTRAVENOUS at 18:13

## 2024-10-04 ASSESSMENT — PAIN DESCRIPTION - FREQUENCY: FREQUENCY: CONTINUOUS

## 2024-10-04 ASSESSMENT — LIFESTYLE VARIABLES
HOW OFTEN DO YOU HAVE A DRINK CONTAINING ALCOHOL: NEVER
HOW MANY STANDARD DRINKS CONTAINING ALCOHOL DO YOU HAVE ON A TYPICAL DAY: PATIENT DOES NOT DRINK

## 2024-10-04 ASSESSMENT — ENCOUNTER SYMPTOMS
SORE THROAT: 0
COLOR CHANGE: 0
SHORTNESS OF BREATH: 0
ABDOMINAL DISTENTION: 0
ABDOMINAL PAIN: 0
EYE DISCHARGE: 0
RHINORRHEA: 0
CONSTIPATION: 0

## 2024-10-04 ASSESSMENT — PAIN SCALES - GENERAL
PAINLEVEL_OUTOF10: 10
PAINLEVEL_OUTOF10: 9

## 2024-10-04 ASSESSMENT — PAIN DESCRIPTION - DESCRIPTORS: DESCRIPTORS: ACHING

## 2024-10-04 ASSESSMENT — PAIN DESCRIPTION - LOCATION
LOCATION: HIP

## 2024-10-04 ASSESSMENT — PAIN DESCRIPTION - ORIENTATION: ORIENTATION: LEFT

## 2024-10-04 ASSESSMENT — PAIN - FUNCTIONAL ASSESSMENT: PAIN_FUNCTIONAL_ASSESSMENT: 0-10

## 2024-10-04 ASSESSMENT — PAIN DESCRIPTION - PAIN TYPE: TYPE: ACUTE PAIN

## 2024-10-04 NOTE — ED PROVIDER NOTES
the potential fracture, as she cannot visualize it on plain films, as dictated by radiology.  Recommendation for patient does have a fracture, due to hip replacement prosthesis, patient will require transfer to [facility] for further management.    CT scan of left hip was completed, shows subtle subtrochanteric fracture of the left femur without displacement.  I spoke with Dr. Mcmanus again orthopedics, recommendation is transfer to [facility], as this does require specialized care that she feels is not available here at [facility].  [facility] was notified, and will accept patient as a trauma transfer from ED to ED, accepting physician will be Dr. Chun    Amount and/or Complexity of Data Reviewed  Labs: ordered.  Radiology: ordered.    Risk  Prescription drug management.            REASSESSMENT          CRITICAL CARE TIME       CONSULTS:  None    PROCEDURES:  Unless otherwise noted below, none     Procedures      FINAL IMPRESSION      1. Closed left hip fracture, initial encounter ([facility])    2. Closed fracture of one rib of right side, initial encounter          DISPOSITION/PLAN   DISPOSITION Decision To Transfer 10/04/2024 03:49:07 PM      PATIENT REFERRED TO:  No follow-up provider specified.    DISCHARGE MEDICATIONS:  New Prescriptions    No medications on file     Controlled Substances Monitoring:     RX Monitoring Attestation Periodic Controlled Substance Monitoring   8/2/2016   8:35 AM The Prescription Monitoring Report for this patient was reviewed today. No signs of potential drug abuse or diversion identified.       (Please note that portions of this note were completed with a voice recognition program.  Efforts were made to edit the dictations but occasionally words are mis-transcribed.)    Fawad Ybarra PA-C (electronically signed)  Attending Emergency Physician    Supervising Attending Physician: Dr. Bah.      Fawad Ybarra PA-C  10/04/24 [id number]

## 2024-10-04 NOTE — ED TRIAGE NOTES
Patient present to the ED from home via private car.  Patient report that she tripped on a bag trying to  a bag and landed on the left hip  Per patient the floor is cement floor.  Patient report Hx of hip replacement to affected side   Patient is A/O x 4 at this time    Refill requested for:     Concerta 36mg    Last filled on:     8/12/2020 #30 0 refills  Last office visit:     6/3/2020  Pending office visit 9/8/2020    Medication can not be filled by protocol. Physician authorization required.

## 2024-10-09 ENCOUNTER — OFFICE VISIT (OUTPATIENT)
Dept: GERIATRIC MEDICINE | Age: 64
End: 2024-10-09
Payer: MEDICARE

## 2024-10-09 DIAGNOSIS — G89.29 CHRONIC BACK PAIN, UNSPECIFIED BACK LOCATION, UNSPECIFIED BACK PAIN LATERALITY: ICD-10-CM

## 2024-10-09 DIAGNOSIS — I10 ESSENTIAL HYPERTENSION: Primary | ICD-10-CM

## 2024-10-09 DIAGNOSIS — M54.9 CHRONIC BACK PAIN, UNSPECIFIED BACK LOCATION, UNSPECIFIED BACK PAIN LATERALITY: ICD-10-CM

## 2024-10-09 DIAGNOSIS — M25.552 LEFT HIP PAIN: ICD-10-CM

## 2024-10-09 DIAGNOSIS — K74.60 CIRRHOSIS OF LIVER WITHOUT ASCITES, UNSPECIFIED HEPATIC CIRRHOSIS TYPE (HCC): ICD-10-CM

## 2024-10-09 PROCEDURE — 99305 1ST NF CARE MODERATE MDM 35: CPT | Performed by: INTERNAL MEDICINE

## 2024-10-09 PROCEDURE — 1123F ACP DISCUSS/DSCN MKR DOCD: CPT | Performed by: INTERNAL MEDICINE

## 2024-10-10 RX ORDER — ASPIRIN 81 MG/1
81 TABLET, CHEWABLE ORAL DAILY
COMMUNITY

## 2024-10-10 RX ORDER — CAMPHOR, MENTHOL, METHYL SALICYLATE 21.56; 41.73; 69.55 MG/1; MG/1; MG/1
PATCH PERCUTANEOUS; TOPICAL; TRANSDERMAL EVERY MORNING
COMMUNITY

## 2024-10-12 LAB
ANION GAP SERPL CALCULATED.3IONS-SCNC: 9 MEQ/L (ref 9–15)
BUN SERPL-MCNC: 26 MG/DL (ref 8–23)
CALCIUM SERPL-MCNC: 9.1 MG/DL (ref 8.5–9.9)
CHLORIDE SERPL-SCNC: 106 MEQ/L (ref 95–107)
CO2 SERPL-SCNC: 29 MEQ/L (ref 20–31)
CREAT SERPL-MCNC: 0.68 MG/DL (ref 0.5–0.9)
ERYTHROCYTE [DISTWIDTH] IN BLOOD BY AUTOMATED COUNT: 14.8 % (ref 11.5–14.5)
GLUCOSE SERPL-MCNC: 75 MG/DL (ref 70–99)
HCT VFR BLD AUTO: 38.5 % (ref 37–47)
HGB BLD-MCNC: 12.4 G/DL (ref 12–16)
MCH RBC QN AUTO: 30.1 PG (ref 27–31.3)
MCHC RBC AUTO-ENTMCNC: 32.2 % (ref 33–37)
MCV RBC AUTO: 93.4 FL (ref 79.4–94.8)
PLATELET # BLD AUTO: 219 K/UL (ref 130–400)
POTASSIUM SERPL-SCNC: 4.2 MEQ/L (ref 3.4–4.9)
RBC # BLD AUTO: 4.12 M/UL (ref 4.2–5.4)
SODIUM SERPL-SCNC: 144 MEQ/L (ref 135–144)
WBC # BLD AUTO: 4.7 K/UL (ref 4.8–10.8)

## 2024-10-16 ENCOUNTER — OFFICE VISIT (OUTPATIENT)
Dept: GERIATRIC MEDICINE | Age: 64
End: 2024-10-16

## 2024-10-16 DIAGNOSIS — K74.60 CIRRHOSIS OF LIVER WITHOUT ASCITES, UNSPECIFIED HEPATIC CIRRHOSIS TYPE (HCC): Primary | ICD-10-CM

## 2024-10-21 ENCOUNTER — OFFICE VISIT (OUTPATIENT)
Dept: GERIATRIC MEDICINE | Age: 64
End: 2024-10-21
Payer: MEDICARE

## 2024-10-21 DIAGNOSIS — K70.30 ESOPHAGEAL VARICES IN ALCOHOLIC CIRRHOSIS (HCC): Primary | ICD-10-CM

## 2024-10-21 DIAGNOSIS — I10 ESSENTIAL HYPERTENSION: ICD-10-CM

## 2024-10-21 DIAGNOSIS — K74.60 CIRRHOSIS OF LIVER WITHOUT ASCITES, UNSPECIFIED HEPATIC CIRRHOSIS TYPE (HCC): ICD-10-CM

## 2024-10-21 DIAGNOSIS — I85.10 ESOPHAGEAL VARICES IN ALCOHOLIC CIRRHOSIS (HCC): Primary | ICD-10-CM

## 2024-10-21 PROCEDURE — 1123F ACP DISCUSS/DSCN MKR DOCD: CPT | Performed by: INTERNAL MEDICINE

## 2024-10-21 PROCEDURE — 99308 SBSQ NF CARE LOW MDM 20: CPT | Performed by: INTERNAL MEDICINE

## 2024-10-22 ENCOUNTER — OFFICE VISIT (OUTPATIENT)
Dept: GERIATRIC MEDICINE | Age: 64
End: 2024-10-22
Payer: MEDICARE

## 2024-10-22 DIAGNOSIS — I10 ESSENTIAL HYPERTENSION: ICD-10-CM

## 2024-10-22 DIAGNOSIS — Z87.81 STATUS POST CLOSED FRACTURE OF LEFT HIP: ICD-10-CM

## 2024-10-22 DIAGNOSIS — M25.552 LEFT HIP PAIN: Primary | ICD-10-CM

## 2024-10-22 PROCEDURE — 99308 SBSQ NF CARE LOW MDM 20: CPT | Performed by: NURSE PRACTITIONER

## 2024-10-22 PROCEDURE — 1123F ACP DISCUSS/DSCN MKR DOCD: CPT | Performed by: NURSE PRACTITIONER

## 2024-10-23 VITALS
WEIGHT: 132.9 LBS | OXYGEN SATURATION: 97 % | RESPIRATION RATE: 18 BRPM | BODY MASS INDEX: 27.78 KG/M2 | TEMPERATURE: 97.9 F | HEART RATE: 82 BPM | SYSTOLIC BLOOD PRESSURE: 132 MMHG | DIASTOLIC BLOOD PRESSURE: 89 MMHG

## 2024-10-23 NOTE — PROGRESS NOTES
Name: Nehal Martin   [address] [place]  [address]    Date: 10/22/2024     Subjective:     Chief Complaint   Patient presents with    Follow-up     Left hip fracture status post fall at home, pain, COPD, hypertension       HPI  Nehal Martin is a 64 y.o. female being seen today for evaluation of left hip pain status post left hip fracture and hypertension.  Resident is up in the wheelchair, well-developed, no acute distress.  She is able self-propel into the hallway without difficulty.  She had a hip fracture that she sustained after falling at home, no surgical intervention.  She has been advanced to flatfoot weightbearing to the left leg, practiced curb step in therapy today and did well.  She is assist of 1 for transfers ambulated 5 feet with PT.  With OT she requires moderate assistance with ADLs.  Making progress in all therapy disciplines.  Has a follow-up scheduled for 10/24 with cardiology in 11/18 with Ortho at [facility].  He is on tramadol for pain, is requesting to discontinue lidocaine patches as he does not like the odor.  Vital signs stable no fever.  Appetite is fair urine output is good based on intake.  Continue current plan of care.    Past Medical History:   Diagnosis Date    Cirrhosis (HCC)     Elevated LFTs     ETOH abuse     Heart murmur     Hypertension          Allergies:  Reviewed in nursing facility records  Medications:  Reviewed and reconciled in nursing facility records    Review of Systems  A 10 Point review of systems was performed and is negative unless previously stated      Objective:   /89   Pulse 82   Temp 97.9 °F (36.6 °C)   Resp 18   Wt 60.3 kg (132 lb 14.4 oz)   LMP 09/26/2012 (Approximate)   SpO2 97%   BMI 27.78 kg/m²     Physical Exam  Constitutional:  in wheelchair, well-developed, in no acute distess  Pulmonary/Chest:  breathing unlabored, no use of accessory muscles, lung sounds clear and diminished in lower lobes,

## 2024-10-24 ENCOUNTER — APPOINTMENT (OUTPATIENT)
Dept: CARDIOLOGY | Facility: CLINIC | Age: 64
End: 2024-10-24
Payer: MEDICARE

## 2024-10-25 ENCOUNTER — OFFICE VISIT (OUTPATIENT)
Dept: GERIATRIC MEDICINE | Age: 64
End: 2024-10-25

## 2024-10-25 DIAGNOSIS — M25.552 LEFT HIP PAIN: Primary | ICD-10-CM

## 2024-10-25 DIAGNOSIS — Z87.81 STATUS POST CLOSED FRACTURE OF LEFT HIP: ICD-10-CM

## 2024-10-25 DIAGNOSIS — I10 ESSENTIAL HYPERTENSION: ICD-10-CM

## 2024-10-29 ENCOUNTER — OFFICE VISIT (OUTPATIENT)
Dept: GERIATRIC MEDICINE | Age: 64
End: 2024-10-29
Payer: MEDICARE

## 2024-10-29 DIAGNOSIS — I10 ESSENTIAL HYPERTENSION: ICD-10-CM

## 2024-10-29 DIAGNOSIS — M25.552 LEFT HIP PAIN: Primary | ICD-10-CM

## 2024-10-29 DIAGNOSIS — Z87.81 STATUS POST CLOSED FRACTURE OF LEFT HIP: ICD-10-CM

## 2024-10-29 PROCEDURE — 99316 NF DSCHRG MGMT 30 MIN+: CPT | Performed by: NURSE PRACTITIONER

## 2024-10-30 NOTE — PROGRESS NOTES
hypertension                Assessment and Plan:      1. Left hip pain/Status post closed fracture of left hip  Moderate pain, continue tramadol as ordered.  On aspirin twice a day for DVT prophylaxis, continue as ordered.  Follow-up with Ortho 11/18/2024 at [facility].    2. Essential hypertension  Stable, not on any antihypertensives.  Labs within acceptable range.      Reviewed labs:  Yes    CBC  Lab Results   Component Value Date/Time    WBC 5.7 10/21/2024 05:07 PM    RBC 4.30 10/21/2024 05:07 PM    RBC 2.97 05/06/2012 06:40 AM    HGB 12.9 10/21/2024 05:07 PM    HCT 39.4 10/21/2024 05:07 PM    MCV 91.7 10/21/2024 05:07 PM    MCH 30.1 10/21/2024 05:07 PM    MCHC 32.8 10/21/2024 05:07 PM    RDW 14.8 10/12/2024 07:04 AM     10/21/2024 05:07 PM      BMP  Lab Results   Component Value Date/Time     10/21/2024 05:07 PM    K 4.4 10/21/2024 05:07 PM     10/21/2024 05:07 PM    CO2 27 10/21/2024 05:07 PM    BUN 23 10/21/2024 05:07 PM    CREATININE 0.8 10/21/2024 05:07 PM    GLUCOSE 71 10/21/2024 05:07 PM    GLUCOSE 91 05/06/2012 06:40 AM    CALCIUM 9.2 10/21/2024 05:07 PM      HgBA1c:    Lab Results   Component Value Date/Time    LABA1C 5.6 09/01/2016 12:46 PM     TSH:  Lab Results   Component Value Date/Time    TSH 1.560 02/24/2018 01:00 PM     Lipids:  Lab Results   Component Value Date/Time    CHOL 142 01/31/2018 02:30 PM    HDL 69 01/31/2018 02:30 PM    TRIG 94 01/31/2018 02:30 PM         I have reviewed the patient's medical history in detail and updated the computerized patient record.    This note was partially generated using Dragon voice recognition system, and there may be some incorrect words, spellings, punctuation that were not noticed in checking the note before saving.      Electronically signed by: CHELSEA Acosta CNP on 10/25/2024

## 2024-10-31 ENCOUNTER — APPOINTMENT (OUTPATIENT)
Dept: CARDIOLOGY | Facility: CLINIC | Age: 64
End: 2024-10-31
Payer: MEDICARE

## 2024-10-31 VITALS
HEART RATE: 80 BPM | SYSTOLIC BLOOD PRESSURE: 160 MMHG | HEIGHT: 57 IN | BODY MASS INDEX: 28.69 KG/M2 | DIASTOLIC BLOOD PRESSURE: 82 MMHG | WEIGHT: 133 LBS

## 2024-10-31 DIAGNOSIS — I25.10 CORONARY ARTERY CALCIFICATION SEEN ON CAT SCAN: ICD-10-CM

## 2024-10-31 DIAGNOSIS — R06.02 SHORTNESS OF BREATH: Primary | ICD-10-CM

## 2024-10-31 DIAGNOSIS — I20.89 STABLE ANGINA PECTORIS (CMS-HCC): ICD-10-CM

## 2024-10-31 PROCEDURE — 3079F DIAST BP 80-89 MM HG: CPT | Performed by: INTERNAL MEDICINE

## 2024-10-31 PROCEDURE — 3077F SYST BP >= 140 MM HG: CPT | Performed by: INTERNAL MEDICINE

## 2024-10-31 PROCEDURE — 93000 ELECTROCARDIOGRAM COMPLETE: CPT | Performed by: INTERNAL MEDICINE

## 2024-10-31 PROCEDURE — 3008F BODY MASS INDEX DOCD: CPT | Performed by: INTERNAL MEDICINE

## 2024-10-31 PROCEDURE — 1159F MED LIST DOCD IN RCRD: CPT | Performed by: INTERNAL MEDICINE

## 2024-10-31 PROCEDURE — 99205 OFFICE O/P NEW HI 60 MIN: CPT | Performed by: INTERNAL MEDICINE

## 2024-10-31 RX ORDER — ASPIRIN 81 MG/1
81 TABLET ORAL DAILY
COMMUNITY

## 2024-10-31 RX ORDER — AMINOPHYLLINE 25 MG/ML
125 INJECTION, SOLUTION INTRAVENOUS ONCE AS NEEDED
OUTPATIENT
Start: 2024-10-31

## 2024-10-31 RX ORDER — REGADENOSON 0.08 MG/ML
0.4 INJECTION, SOLUTION INTRAVENOUS
OUTPATIENT
Start: 2024-10-31

## 2024-10-31 RX ORDER — TRAMADOL HYDROCHLORIDE 50 MG/1
50 TABLET ORAL EVERY 6 HOURS PRN
COMMUNITY

## 2024-11-08 NOTE — PROGRESS NOTES
Patient Name: Nehal Martin  Date: 11/7/2024  YOB: 1960  Medical Record Number: [id number]              History of Present Illness:      Review of Systems    Review of Systems: All 14 review of systems negative other than as stated above    Social History     Tobacco Use    Smoking status: Every Day     Current packs/day: 1.00     Average packs/day: 1 pack/day for 40.0 years (40.0 ttl pk-yrs)     Types: Cigarettes    Smokeless tobacco: Never   Substance Use Topics    Alcohol use: Yes     Alcohol/week: 0.0 standard drinks of alcohol     Comment: alcoholism         Past Medical History:   Diagnosis Date    Cirrhosis (HCC)     Elevated LFTs     ETOH abuse     Heart murmur     Hypertension            Past Surgical History:   Procedure Laterality Date    CARDIAC CATHETERIZATION  4-2013    clean \"luminal irregs\"    CHOLECYSTECTOMY  6/22/13    Lapchole    COLONOSCOPY  9/2011    polyps needs by 2016    GASTRIC BYPASS SURGERY  2003    LAPAROSCOPY  1992    OTHER SURGICAL HISTORY      skin removal from abd/weight loss    TOTAL HIP ARTHROPLASTY  4-2012    left hip    UPPER GASTROINTESTINAL ENDOSCOPY  9-26-11    marino-polyps    UPPER GASTROINTESTINAL ENDOSCOPY  5/18/14     DR. NEAL         No current outpatient medications on file prior to visit.     No current facility-administered medications on file prior to visit.       Allergies   Allergen Reactions    Penicillins          Family History   Problem Relation Age of Onset    Diabetes Father     Heart Disease Father     Diabetes Sister     Thyroid Disease Sister     Cancer Other          Physical Exam:      Physical Exam    Last menstrual period 09/26/2012, not currently breastfeeding.      .   Lab Results   Component Value Date    WBC 5.7 10/21/2024    HGB 12.9 10/21/2024    HCT 39.4 10/21/2024    MCV 91.7 10/21/2024     10/21/2024     Lab Results   Component Value Date/Time     10/21/2024 05:07 PM    K 4.4 10/21/2024 05:07 PM     10/21/2024

## 2024-11-11 ENCOUNTER — TELEPHONE (OUTPATIENT)
Dept: PRIMARY CARE | Facility: CLINIC | Age: 64
End: 2024-11-11
Payer: MEDICARE

## 2024-11-11 DIAGNOSIS — H10.023 PINK EYE DISEASE OF BOTH EYES: Primary | ICD-10-CM

## 2024-11-11 DIAGNOSIS — H10.023 PINK EYE DISEASE OF BOTH EYES: ICD-10-CM

## 2024-11-11 RX ORDER — GENTAMICIN SULFATE 0.3 %
0.5 OINTMENT (GRAM) OPHTHALMIC (EYE) 3 TIMES DAILY
Qty: 5 G | Refills: 0 | Status: SHIPPED | OUTPATIENT
Start: 2024-11-11 | End: 2024-11-11

## 2024-11-11 RX ORDER — ERYTHROMYCIN 5 MG/G
OINTMENT OPHTHALMIC 3 TIMES DAILY
Qty: 3.5 G | Refills: 0 | Status: SHIPPED | OUTPATIENT
Start: 2024-11-11

## 2024-11-11 RX ORDER — ERYTHROMYCIN 5 MG/G
OINTMENT OPHTHALMIC
Qty: 3.5 G | Refills: 0 | Status: SHIPPED | OUTPATIENT
Start: 2024-11-11 | End: 2024-11-11 | Stop reason: SDUPTHER

## 2024-11-11 NOTE — TELEPHONE ENCOUNTER
Nancy from Home Health called stated she thinks patient has pink eye.  Her left eye is red and painful with crusty stuff around it.  She is asking eye drops be sent to [facility] in [place].   Inform HHC and pt  That TELEPHONE VISIT MADE bc cannot send meds without visit of some sort   AND NEEDS OPHTHO IF NOT much better in 48hrs  Or if eye pain, blurred vision etc  Will send gent oint    Call also or ER if worse  Should be 100% by Friday  The purpose of an E VISIT/PHONE VISIT   is a convenient, affordable way to receive a treatment plan for mild to moderate illnesses or problems. An E VISIT is limited in its ability to diagnose and treat and is not as thorough nor helpful as an in person visit and examination. It must be understood that nothing replaces EMERGENCY ROOM CARE /Calling 911 for emergencies or any pain of severe nature-not limited to but certainly including chest, abdominal, or headache pain and any bleeding that cannot be easily stopped, dizziness/ light headedness and many other urgent symptoms NOT mentioned some include symptoms of heart attack or stroke.     We are doing an E VISIT to help YOU as a convenience to get tests ordered or treatments started-saving you time. Especially since multiple messages are sent each day to our office and some medical issues can be streamlined or investigations and/ or treatment started by Email methods. However, many are NOT conducive to this email type communication.  Also-we may initially select an email visit as appropriate but unwanted side effects of medicine or new concerns may arise-in which case -another E VISIT is INAPPROPRIATE and should result in an in-person visit.  Thank you.    Dr canseco

## 2024-11-14 PROBLEM — I24.89: Status: ACTIVE | Noted: 2024-10-15

## 2024-11-14 PROBLEM — S72.002A CLOSED FRACTURE OF LEFT HIP: Status: ACTIVE | Noted: 2024-10-05

## 2024-11-19 ENCOUNTER — APPOINTMENT (OUTPATIENT)
Dept: PRIMARY CARE | Facility: CLINIC | Age: 64
End: 2024-11-19
Payer: MEDICARE

## 2024-11-19 VITALS
HEIGHT: 57 IN | BODY MASS INDEX: 28.78 KG/M2 | DIASTOLIC BLOOD PRESSURE: 76 MMHG | RESPIRATION RATE: 16 BRPM | SYSTOLIC BLOOD PRESSURE: 148 MMHG | OXYGEN SATURATION: 96 % | TEMPERATURE: 97.3 F | HEART RATE: 98 BPM

## 2024-11-19 DIAGNOSIS — I10 ESSENTIAL HYPERTENSION: ICD-10-CM

## 2024-11-19 DIAGNOSIS — S72.102S CLOSED FRACTURE OF TROCHANTER OF LEFT FEMUR, SEQUELA: ICD-10-CM

## 2024-11-19 DIAGNOSIS — F15.10 METHAMPHETAMINE USE (MULTI): ICD-10-CM

## 2024-11-19 DIAGNOSIS — F10.21 HISTORY OF ALCOHOLISM (MULTI): ICD-10-CM

## 2024-11-19 DIAGNOSIS — J44.9 CHRONIC OBSTRUCTIVE PULMONARY DISEASE, UNSPECIFIED COPD TYPE (MULTI): ICD-10-CM

## 2024-11-19 DIAGNOSIS — Z98.84 HISTORY OF GASTRIC RESTRICTIVE SURGERY: ICD-10-CM

## 2024-11-19 DIAGNOSIS — R73.9 HYPERGLYCEMIA: ICD-10-CM

## 2024-11-19 DIAGNOSIS — M81.0 OSTEOPOROSIS: ICD-10-CM

## 2024-11-19 DIAGNOSIS — F33.9 DEPRESSION, RECURRENT (CMS-HCC): ICD-10-CM

## 2024-11-19 DIAGNOSIS — C50.412 MALIGNANT NEOPLASM OF UPPER-OUTER QUADRANT OF LEFT FEMALE BREAST, UNSPECIFIED ESTROGEN RECEPTOR STATUS: ICD-10-CM

## 2024-11-19 DIAGNOSIS — K62.3 RECTAL PROLAPSE: Primary | ICD-10-CM

## 2024-11-19 DIAGNOSIS — I24.89: ICD-10-CM

## 2024-11-19 DIAGNOSIS — E78.5 HYPERLIPIDEMIA, UNSPECIFIED HYPERLIPIDEMIA TYPE: ICD-10-CM

## 2024-11-19 PROCEDURE — 99214 OFFICE O/P EST MOD 30 MIN: CPT | Performed by: FAMILY MEDICINE

## 2024-11-19 PROCEDURE — 3078F DIAST BP <80 MM HG: CPT | Performed by: FAMILY MEDICINE

## 2024-11-19 PROCEDURE — 3077F SYST BP >= 140 MM HG: CPT | Performed by: FAMILY MEDICINE

## 2024-11-19 PROCEDURE — 1159F MED LIST DOCD IN RCRD: CPT | Performed by: FAMILY MEDICINE

## 2024-11-19 RX ORDER — GENTAMICIN SULFATE 1 MG/G
OINTMENT TOPICAL
COMMUNITY
Start: 2024-11-18

## 2024-11-19 ASSESSMENT — ENCOUNTER SYMPTOMS
CONSTIPATION: 1
COUGH: 0
BACK PAIN: 1

## 2024-11-19 NOTE — PROGRESS NOTES
Covid vax: x 3  Flu: UTD per pt  Pneumo: given in NH per pt  RSV: advised  Shingles: UTD    CRC: due  Mammogram: 8/2024  Pap: n/a  Lmp: n/a

## 2024-11-19 NOTE — PROGRESS NOTES
"Subjective   Patient ID: Antonieta Boswell is a 64 y.o. female who presents for Follow-up (Fall, left femur fracture).  Covid vax: x 3  Flu: UTD per pt  Pneumo: given in NH per pt  RSV: advised  Shingles: UTD     CRC: due  Mammogram: 8/2024  Pap: n/a  Lmp: n/a     HPI  Patient Active Problem List   Diagnosis    COPD (chronic obstructive pulmonary disease) (Multi)    Essential hypertension    History of alcoholism (Multi)    History of gastric restrictive surgery    Hyperlipidemia, unspecified    Malignant neoplasm of upper-outer quadrant of left female breast    Urinary incontinence    Methamphetamine use (Multi)    Depression, recurrent (CMS-HCC)    Closed fracture of left hip    Subendocardial ischemia (Multi)       Past Surgical History:   Procedure Laterality Date    BARIATRIC SURGERY  2000    BREAST LUMPECTOMY      CHOLECYSTECTOMY  2010    TONSILLECTOMY  1964    with adenoidectomy    TOTAL HIP ARTHROPLASTY Left 2009       Review of Systems  This patient has  NO history of seizures/ CAD or CVA    NO history of recent Covid nor flu symptoms,  NO Fever nor chills,  NO Chest pain, shortness of breath nor paroxysmal nocturnal dyspnea,  NO Nausea, vomiting, nor diarrhea,  NO Hematochezia nor melena,  NO Dysuria, hematuria, nor new incontinence issues  NO new severe headaches nor neurological complaints,  NO new issues with anxiety nor depression nor new psychiatric complaints,  NO suicidal nor homicidal ideations.     OBJECTIVE:  /76   Pulse 98   Temp 36.3 °C (97.3 °F) (Temporal)   Resp 16   Ht 1.448 m (4' 9\")   SpO2 96%   BMI 28.78 kg/m²      General:  alert, oriented, no acute distress.  No obvious skin rashes noted.   No gait disturbance noted.    Mood is pleasant,  no signs of emotional distress.   Not appearing intoxicated or altered.   No voiced delusions,   Normal, appropriate behavior.    HEENT: Normocephalic, atraumatic,   Pupils round, reactive to light  Extraocular motions intact and wnl  Tympanic " membranes normal    Neck: no nuchal rigidity  No masses palpable.  No carotid bruits.  No thyromegaly.    Respiratory: Equal breath sounds  No wheezes,    rales,    nor rhonchi  No respiratory distress.    Heart: Regular rate and rhythm, no    murmurs  no rubs/gallops    Abdomen: no masses palpable, nontender, no rebound nor guarding.    Extremities: NO cyanosis noted, no clubbing.   No edema noted.  2+dorsalis pedis pulses.    Normal-not antalgic, steady gait.  Kyphosis  L hip no surgery for recent frx    In wheelchair    No visits with results within 3 Month(s) from this visit.   Latest known visit with results is:   Lab on 08/29/2023   Component Date Value Ref Range Status    WBC 08/29/2023 3.6 (L)  4.4 - 11.3 x10E9/L Final    RBC 08/29/2023 4.65  4.00 - 5.20 x10E12/L Final    Hemoglobin 08/29/2023 13.2  12.0 - 16.0 g/dL Final    Hematocrit 08/29/2023 42.5  36.0 - 46.0 % Final    MCV 08/29/2023 91  80 - 100 fL Final    MCHC 08/29/2023 31.1 (L)  32.0 - 36.0 g/dL Final    Platelets 08/29/2023 190  150 - 450 x10E9/L Final    RDW 08/29/2023 14.6 (H)  11.5 - 14.5 % Final    Neutrophils % 08/29/2023 50.0  40.0 - 80.0 % Final    Immature Granulocytes %, Automated 08/29/2023 0.3  0.0 - 0.9 % Final     Immature Granulocyte Count (IG) includes promyelocytes,    myelocytes and metamyelocytes but does not include bands.   Percent differential counts (%) should be interpreted in the   context of the absolute cell counts (cells/L).    Lymphocytes % 08/29/2023 37.3  13.0 - 44.0 % Final    Monocytes % 08/29/2023 8.0  2.0 - 10.0 % Final    Eosinophils % 08/29/2023 3.6  0.0 - 6.0 % Final    Basophils % 08/29/2023 0.8  0.0 - 2.0 % Final    Neutrophils Absolute 08/29/2023 1.81  1.20 - 7.70 x10E9/L Final    Lymphocytes Absolute 08/29/2023 1.35  1.20 - 4.80 x10E9/L Final    Monocytes Absolute 08/29/2023 0.29  0.10 - 1.00 x10E9/L Final    Eosinophils Absolute 08/29/2023 0.13  0.00 - 0.70 x10E9/L Final    Basophils Absolute 08/29/2023  0.03  0.00 - 0.10 x10E9/L Final    Glucose 08/29/2023 80  74 - 99 mg/dL Final    Sodium 08/29/2023 143  136 - 145 mmol/L Final    Potassium 08/29/2023 4.0  3.5 - 5.3 mmol/L Final    Chloride 08/29/2023 108 (H)  98 - 107 mmol/L Final    Bicarbonate 08/29/2023 28  21 - 32 mmol/L Final    Anion Gap 08/29/2023 11  10 - 20 mmol/L Final    Urea Nitrogen 08/29/2023 23  6 - 23 mg/dL Final    Creatinine 08/29/2023 0.94  0.50 - 1.05 mg/dL Final    GFR Female 08/29/2023 68  >90 mL/min/1.73m2 Final     CALCULATIONS OF ESTIMATED GFR ARE PERFORMED   USING THE 2021 CKD-EPI STUDY REFIT EQUATION   WITHOUT THE RACE VARIABLE FOR THE IDMS-TRACEABLE   CREATININE METHODS.    https://jasn.asnjournals.org/content/early/2021/09/22/ASN.6387975727    Calcium 08/29/2023 9.6  8.6 - 10.3 mg/dL Final    Albumin 08/29/2023 4.2  3.4 - 5.0 g/dL Final    Alkaline Phosphatase 08/29/2023 92  33 - 136 U/L Final    Total Protein 08/29/2023 6.7  6.4 - 8.2 g/dL Final    AST 08/29/2023 20  9 - 39 U/L Final    Total Bilirubin 08/29/2023 0.3  0.0 - 1.2 mg/dL Final    ALT (SGPT) 08/29/2023 13  7 - 45 U/L Final     Patients treated with Sulfasalazine may generate    falsely decreased results for ALT.    Hemoglobin A1C 08/29/2023 5.1  % Final         Diagnosis of Diabetes-Adults   Non-Diabetic: < or = 5.6%   Increased risk for developing diabetes: 5.7-6.4%   Diagnostic of diabetes: > or = 6.5%  .       Monitoring of Diabetes                Age (y)     Therapeutic Goal (%)   Adults:          >18           <7.0   Pediatrics:    13-18           <7.5                   7-12           <8.0                   0- 6            7.5-8.5   American Diabetes Association. Diabetes Care 33(S1), Jan 2010.    Estimated Average Glucose 08/29/2023 100  MG/DL Final    Cholesterol 08/29/2023 162  0 - 199 mg/dL Final    .      AGE      DESIRABLE   BORDERLINE HIGH   HIGH     0-19 Y     0 - 169       170 - 199     >/= 200    20-24 Y     0 - 189       190 - 224     >/= 225         >24 Y      0 - 199       200 - 239     >/= 240   **All ranges are based on fasting samples. Specific   therapeutic targets will vary based on patient-specific   cardiac risk.  .   Pediatric guidelines reference:Pediatrics 2011, 128(S5).   Adult guidelines reference: NCEP ATPIII Guidelines,     JO 2001, 258:2486-97  .   Venipuncture immediately after or during the    administration of Metamizole may lead to falsely   low results. Testing should be performed immediately   prior to Metamizole dosing.    HDL 08/29/2023 82.3  mg/dL Final    .      AGE      VERY LOW   LOW     NORMAL    HIGH       0-19 Y       < 35   < 40     40-45     ----    20-24 Y       ----   < 40       >45     ----      >24 Y       ----   < 40     40-60      >60  .    Cholesterol/HDL Ratio 08/29/2023 2.0   Final    REF VALUES  DESIRABLE  < 3.4  HIGH RISK  > 5.0    LDL 08/29/2023 65  0 - 99 mg/dL Final    .                           NEAR      BORD      AGE      DESIRABLE  OPTIMAL    HIGH     HIGH     VERY HIGH     0-19 Y     0 - 109     ---    110-129   >/= 130     ----    20-24 Y     0 - 119     ---    120-159   >/= 160     ----      >24 Y     0 -  99   100-129  130-159   160-189     >/=190  .    VLDL 08/29/2023 15  0 - 40 mg/dL Final    Triglycerides 08/29/2023 75  0 - 149 mg/dL Final    .      AGE      DESIRABLE   BORDERLINE HIGH   HIGH     VERY HIGH   0 D-90 D    19 - 174         ----         ----        ----  91 D- 9 Y     0 -  74        75 -  99     >/= 100      ----    10-19 Y     0 -  89        90 - 129     >/= 130      ----    20-24 Y     0 - 114       115 - 149     >/= 150      ----         >24 Y     0 - 149       150 - 199    200- 499    >/= 500  .   Venipuncture immediately after or during the    administration of Metamizole may lead to falsely   low results. Testing should be performed immediately   prior to Metamizole dosing.    TSH 08/29/2023 3.59  0.44 - 3.98 mIU/L Final     TSH testing is performed using different testing    methodology at  [facility] than at other    [facility]. Direct result comparisons should    only be made within the same method.    Free T4 08/29/2023 0.89  0.61 - 1.12 ng/dL Final     Thyroxine Free testing is performed using different testing    methodology at [facility] than at other    [facility]. Direct result comparisons should    only be made within the same method.  .   Biotin can cause falsely elevated free T4 results. Patients   taking a Biotin dose of up to 10 mg/day should refrain from   taking Biotin for 24 hours before sample collection. Patient   taking a Biotin dose of >10 mg/day should consult with their   physician or the laboratory before the blood draw.    Ferritin 08/29/2023 11  8 - 150 ug/L Final    Vitamin B1, Whole Blood 08/29/2023 184 (H)  70 - 180 nmol/L Final    INTERPRETIVE INFORMATION: Vitamin B1, Whole Blood  This assay measures the concentration of thiamine diphosphate   (TDP), the primary active form of vitamin B1. Approximately 90   percent of vitamin B1 present in whole blood is TDP. Thiamine and   thiamine monophosphate, which comprise the remaining 10 percent,   are not measured.  This test was developed and its performance characteristics   determined by [facility]. It has not been cleared or   approved by the US Food and Drug Administration. This test was   performed in a CLIA certified laboratory and is intended for   clinical purposes.  Performed By: [facility]  [address] [place] [id number]  : Jordan Smith MD, PhD  CLIA Number: [id number]    Vitamin B-12 08/29/2023 164 (L)  211 - 911 pg/mL Final    Vit D, 1,25-Dihydroxy 08/29/2023 53.5  19.9 - 79.3 pg/mL Final    INTERPRETIVE INFORMATION: Vitamin D, 1,25-Dihydroxy  This test is primarily indicated during patient evaluation for   hypercalcemia and renal failure. A normal result does not rule out   Vitamin D deficiency. The recommended test for  diagnosing Vitamin   D deficiency is Vitamin D 25-hydroxy.  Performed By: [facility]  [address] [place] [address]  : Jordan Smith MD, PhD  CLIA Number: [id number]    Zinc, Serum or Plasma 08/29/2023 68.2  60.0 - 120.0 ug/dL Final    Comment: INTERPRETIVE INFORMATION: Zinc, Serum or Plasma  Elevated results may be due to skin or collection-related   contamination, including the use of a noncertified metal-free   collection/transport tube. If contamination concerns exist due to   elevated levels of serum/plasma zinc, confirmation with a second   specimen collected in a certified metal-free tube is recommended.  Circulating zinc concentrations are dependent on albumin status   and are depressed with malnutrition.  Zinc may also be lowered   with infection, inflammation, stress, oral contraceptives, and   pregnancy.  Zinc may be elevated with zinc supplementation or   fasting.  Elevated zinc concentrations may interfere with copper   absorption.   This test was developed and its performance characteristics   determined by [facility]. It has not been cleared or   approved by the US Food and Drug Administration. This test was   performed in a CLIA certified laboratory and is intended for   clinical purposes.  Performed                            By: [facility]  [address] [place] [address]  : Jordan Smith MD, PhD  CLIA Number: [id number]        Assessment/Plan     Problem List Items Addressed This Visit       COPD (chronic obstructive pulmonary disease) (Multi)    Essential hypertension    History of alcoholism (Multi)    History of gastric restrictive surgery    Hyperlipidemia, unspecified    Malignant neoplasm of upper-outer quadrant of left female breast    Methamphetamine use (Multi)    Depression, recurrent (CMS-HCC)    Subendocardial ischemia (Multi)     Other Visit Diagnoses       Closed fracture of trochanter of  left femur, sequela              Saw dr reynolds  Follow up at next scheduled visit -as planned  Starting therapy at home  Sees CARDIO  Per pt shows pic of rectal prolapse but denies now happening  Needs stool softener  To surgeon  Signs/sxs necessitating er d/w pt  Mamm utd  Labs soon-appt 3-4mo    Weak bones  Consequences of etoh/meth d/w pt  Overall stable currently though

## 2024-11-20 NOTE — PROGRESS NOTES
SUBJECTIVE:  64-year-old woman seen visit for cirrhosis of liver weakness hypertension emesis or chills no change in bowel or acute pain crisis no recent emesis no acute lightheadedness.  Globally weak.  No new bleeding of acute spasticity      ROS: Limited by cognition  The rest of the 14 point ROS negative    PHYSICAL EXAM: VSS per facility record  Pupils reactive oral mucosa moist chest no crackles or wheezing cardiovascular showed a regular abdomen tender trace edema.  Extremity trace dorsal pulse    ASSESSMENT & PLAN:   Diagnosis Orders   1. Esophageal varices in alcoholic cirrhosis (HCC)        2. Cirrhosis of liver without ascites, unspecified hepatic cirrhosis type (HCC)        3. Essential hypertension          No new bleeding diathesis.  No acute psychosis no acute cephalopathy.  Blood pressure stable orthostasis            Past Medical History:   Diagnosis Date    Cirrhosis (HCC)     Elevated LFTs     ETOH abuse     Heart murmur     Hypertension          Past Surgical History:   Procedure Laterality Date    CARDIAC CATHETERIZATION  4-2013    clean \"luminal irregs\"    CHOLECYSTECTOMY  6/22/13    Lapchole    COLONOSCOPY  9/2011    polyps needs by 2016    GASTRIC BYPASS SURGERY  2003    LAPAROSCOPY  1992    OTHER SURGICAL HISTORY      skin removal from abd/weight loss    TOTAL HIP ARTHROPLASTY  4-2012    left hip    UPPER GASTROINTESTINAL ENDOSCOPY  9-26-11    marino-polyps    UPPER GASTROINTESTINAL ENDOSCOPY  5/18/14     DR. NEAL         Current Outpatient Medications on File Prior to Visit   Medication Sig Dispense Refill    aspirin 81 MG chewable tablet Take 1 tablet by mouth daily      Camphor-Menthol-Methyl Sal (SALONPAS) 3.1-6-10 % PTCH Apply topically every morning       No current facility-administered medications on file prior to visit.         Family History   Problem Relation Age of Onset    Diabetes Father     Heart Disease Father     Diabetes Sister     Thyroid Disease Sister     Cancer Other

## 2024-11-21 ASSESSMENT — ENCOUNTER SYMPTOMS
BACK PAIN: 1
CONSTIPATION: 1
COUGH: 1

## 2024-11-22 ENCOUNTER — ANCILLARY PROCEDURE (OUTPATIENT)
Dept: CARDIOLOGY | Facility: HOSPITAL | Age: 64
End: 2024-11-22
Payer: MEDICARE

## 2024-11-22 DIAGNOSIS — I25.10 CORONARY ARTERY CALCIFICATION SEEN ON CAT SCAN: ICD-10-CM

## 2024-11-22 DIAGNOSIS — R06.02 SHORTNESS OF BREATH: ICD-10-CM

## 2024-11-22 DIAGNOSIS — I20.89 STABLE ANGINA PECTORIS (CMS-HCC): ICD-10-CM

## 2024-11-22 PROCEDURE — 93018 CV STRESS TEST I&R ONLY: CPT | Performed by: INTERNAL MEDICINE

## 2024-11-22 PROCEDURE — A9502 TC99M TETROFOSMIN: HCPCS | Performed by: INTERNAL MEDICINE

## 2024-11-22 PROCEDURE — 93016 CV STRESS TEST SUPVJ ONLY: CPT | Performed by: INTERNAL MEDICINE

## 2024-11-22 PROCEDURE — 3430000001 HC RX 343 DIAGNOSTIC RADIOPHARMACEUTICALS: Performed by: INTERNAL MEDICINE

## 2024-11-22 PROCEDURE — 2500000004 HC RX 250 GENERAL PHARMACY W/ HCPCS (ALT 636 FOR OP/ED): Performed by: INTERNAL MEDICINE

## 2024-11-22 PROCEDURE — 78452 HT MUSCLE IMAGE SPECT MULT: CPT | Performed by: INTERNAL MEDICINE

## 2024-11-22 PROCEDURE — 93017 CV STRESS TEST TRACING ONLY: CPT

## 2024-11-22 RX ORDER — REGADENOSON 0.08 MG/ML
0.4 INJECTION, SOLUTION INTRAVENOUS
Status: COMPLETED | OUTPATIENT
Start: 2024-11-22 | End: 2024-11-22

## 2024-11-25 ENCOUNTER — APPOINTMENT (OUTPATIENT)
Dept: CARDIOLOGY | Facility: CLINIC | Age: 64
End: 2024-11-25
Payer: MEDICARE

## 2024-11-25 DIAGNOSIS — I25.10 CORONARY ARTERY CALCIFICATION SEEN ON CAT SCAN: ICD-10-CM

## 2024-11-25 DIAGNOSIS — I20.89 STABLE ANGINA PECTORIS (CMS-HCC): Primary | ICD-10-CM

## 2024-11-25 DIAGNOSIS — R06.02 SHORTNESS OF BREATH: ICD-10-CM

## 2024-11-25 PROCEDURE — 1159F MED LIST DOCD IN RCRD: CPT | Performed by: INTERNAL MEDICINE

## 2024-11-25 PROCEDURE — 99212 OFFICE O/P EST SF 10 MIN: CPT | Performed by: INTERNAL MEDICINE

## 2024-11-25 NOTE — PROGRESS NOTES
[facility] [place] Virtual visit    Cardiology Office Follow-up: followup of testing    Patient previously seen for Coronary calcium on CT, strong family history of CAD, some chest tightness/burning/heaviness.    At the previous encounter, the patient seen and examined, testing ordered.    After the encounter the patient completed the following testing: stress test which was negative/low risk    There were no interval changes in medical history before this appointment.    Today the patient reports: No further episodes of chest tightness.    Additional recent non-cardiac testing includes: none    ROS: Remainder of 12 review of systems is negative aside from chief complaint.    PHYSICAL EXAM  Virtual visit    Assessment/Plan   Diagnoses and all orders for this visit:  Stable angina pectoris (CMS-HCC)  Shortness of breath  -     Follow Up In Cardiology  Coronary artery calcification seen on CAT scan      Assessment and plan (narrative):    Antonieta Boswell is a 64 y.o. female with chest heaviness and coronary calcium, but low risk stress test.  Continue ASA, lipids are pending but were normal previously.  No need for any further testing at this time.    Followup: 6 months    Shun Zhao MD    Director of Interventional Cardiology  [facility]

## 2024-12-05 ENCOUNTER — APPOINTMENT (OUTPATIENT)
Dept: CARDIOLOGY | Facility: CLINIC | Age: 64
End: 2024-12-05
Payer: MEDICARE

## 2024-12-09 ENCOUNTER — HOSPITAL ENCOUNTER (OUTPATIENT)
Dept: RADIOLOGY | Facility: HOSPITAL | Age: 64
Discharge: HOME | End: 2024-12-09
Payer: MEDICARE

## 2024-12-09 ENCOUNTER — APPOINTMENT (OUTPATIENT)
Dept: RADIOLOGY | Facility: HOSPITAL | Age: 64
DRG: 536 | End: 2024-12-09
Payer: MEDICARE

## 2024-12-09 ENCOUNTER — APPOINTMENT (OUTPATIENT)
Dept: CARDIOLOGY | Facility: HOSPITAL | Age: 64
DRG: 536 | End: 2024-12-09
Payer: MEDICARE

## 2024-12-09 ENCOUNTER — HOSPITAL ENCOUNTER (INPATIENT)
Facility: HOSPITAL | Age: 64
LOS: 2 days | Discharge: NURSING, REHAB OR LONG-TERM CARE | DRG: 536 | End: 2024-12-11
Attending: STUDENT IN AN ORGANIZED HEALTH CARE EDUCATION/TRAINING PROGRAM | Admitting: INTERNAL MEDICINE
Payer: MEDICARE
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
## 2024-12-09 DIAGNOSIS — S32.512A CLOSED FRACTURE OF SUPERIOR RAMUS OF LEFT PUBIS, INITIAL ENCOUNTER (MULTI): Primary | ICD-10-CM

## 2024-12-09 DIAGNOSIS — I10 ESSENTIAL HYPERTENSION: ICD-10-CM

## 2024-12-09 DIAGNOSIS — M81.0 OSTEOPOROSIS: ICD-10-CM

## 2024-12-09 DIAGNOSIS — S72.102S CLOSED FRACTURE OF TROCHANTER OF LEFT FEMUR, SEQUELA: ICD-10-CM

## 2024-12-09 DIAGNOSIS — R79.89 ELEVATED LFTS: ICD-10-CM

## 2024-12-09 DIAGNOSIS — R26.2 AMBULATORY DYSFUNCTION: ICD-10-CM

## 2024-12-09 LAB
ABO GROUP (TYPE) IN BLOOD: NORMAL
ALBUMIN SERPL BCP-MCNC: 3.7 G/DL (ref 3.4–5)
ALP SERPL-CCNC: 131 U/L (ref 33–136)
ALT SERPL W P-5'-P-CCNC: 116 U/L (ref 7–45)
ANION GAP SERPL CALC-SCNC: 12 MMOL/L (ref 10–20)
ANTIBODY SCREEN: NORMAL
AST SERPL W P-5'-P-CCNC: 95 U/L (ref 9–39)
BASOPHILS # BLD AUTO: 0.01 X10*3/UL (ref 0–0.1)
BASOPHILS NFR BLD AUTO: 0.3 %
BILIRUB SERPL-MCNC: 0.8 MG/DL (ref 0–1.2)
BUN SERPL-MCNC: 21 MG/DL (ref 6–23)
CALCIUM SERPL-MCNC: 9.1 MG/DL (ref 8.6–10.3)
CHLORIDE SERPL-SCNC: 104 MMOL/L (ref 98–107)
CO2 SERPL-SCNC: 28 MMOL/L (ref 21–32)
CREAT SERPL-MCNC: 0.7 MG/DL (ref 0.5–1.05)
EGFRCR SERPLBLD CKD-EPI 2021: >90 ML/MIN/1.73M*2
EOSINOPHIL # BLD AUTO: 0.11 X10*3/UL (ref 0–0.7)
EOSINOPHIL NFR BLD AUTO: 3.1 %
ERYTHROCYTE [DISTWIDTH] IN BLOOD BY AUTOMATED COUNT: 13.4 % (ref 11.5–14.5)
GLUCOSE SERPL-MCNC: 98 MG/DL (ref 74–99)
HCT VFR BLD AUTO: 37.3 % (ref 36–46)
HGB BLD-MCNC: 12.3 G/DL (ref 12–16)
IMM GRANULOCYTES # BLD AUTO: 0 X10*3/UL (ref 0–0.7)
IMM GRANULOCYTES NFR BLD AUTO: 0 % (ref 0–0.9)
INR PPP: 1.1 (ref 0.9–1.1)
LYMPHOCYTES # BLD AUTO: 1.07 X10*3/UL (ref 1.2–4.8)
LYMPHOCYTES NFR BLD AUTO: 30.1 %
MCH RBC QN AUTO: 29.9 PG (ref 26–34)
MCHC RBC AUTO-ENTMCNC: 33 G/DL (ref 32–36)
MCV RBC AUTO: 91 FL (ref 80–100)
MONOCYTES # BLD AUTO: 0.34 X10*3/UL (ref 0.1–1)
MONOCYTES NFR BLD AUTO: 9.6 %
NEUTROPHILS # BLD AUTO: 2.03 X10*3/UL (ref 1.2–7.7)
NEUTROPHILS NFR BLD AUTO: 56.9 %
NRBC BLD-RTO: 0 /100 WBCS (ref 0–0)
PLATELET # BLD AUTO: 132 X10*3/UL (ref 150–450)
POTASSIUM SERPL-SCNC: 3.7 MMOL/L (ref 3.5–5.3)
PROT SERPL-MCNC: 6.3 G/DL (ref 6.4–8.2)
PROTHROMBIN TIME: 11.9 SECONDS (ref 9.8–12.8)
RBC # BLD AUTO: 4.11 X10*6/UL (ref 4–5.2)
RH FACTOR (ANTIGEN D): NORMAL
SODIUM SERPL-SCNC: 140 MMOL/L (ref 136–145)
WBC # BLD AUTO: 3.6 X10*3/UL (ref 4.4–11.3)

## 2024-12-09 PROCEDURE — 99285 EMERGENCY DEPT VISIT HI MDM: CPT | Performed by: STUDENT IN AN ORGANIZED HEALTH CARE EDUCATION/TRAINING PROGRAM

## 2024-12-09 PROCEDURE — 73502 X-RAY EXAM HIP UNI 2-3 VIEWS: CPT | Mod: LEFT SIDE | Performed by: STUDENT IN AN ORGANIZED HEALTH CARE EDUCATION/TRAINING PROGRAM

## 2024-12-09 PROCEDURE — 85025 COMPLETE CBC W/AUTO DIFF WBC: CPT | Performed by: REGISTERED NURSE

## 2024-12-09 PROCEDURE — 77080 DXA BONE DENSITY AXIAL: CPT

## 2024-12-09 PROCEDURE — 36415 COLL VENOUS BLD VENIPUNCTURE: CPT | Performed by: REGISTERED NURSE

## 2024-12-09 PROCEDURE — 72192 CT PELVIS W/O DYE: CPT

## 2024-12-09 PROCEDURE — 1100000001 HC PRIVATE ROOM DAILY

## 2024-12-09 PROCEDURE — 77080 DXA BONE DENSITY AXIAL: CPT | Performed by: RADIOLOGY

## 2024-12-09 PROCEDURE — 99223 1ST HOSP IP/OBS HIGH 75: CPT | Performed by: NURSE PRACTITIONER

## 2024-12-09 PROCEDURE — 2500000001 HC RX 250 WO HCPCS SELF ADMINISTERED DRUGS (ALT 637 FOR MEDICARE OP): Performed by: NURSE PRACTITIONER

## 2024-12-09 PROCEDURE — 93005 ELECTROCARDIOGRAM TRACING: CPT

## 2024-12-09 PROCEDURE — 80053 COMPREHEN METABOLIC PANEL: CPT | Performed by: REGISTERED NURSE

## 2024-12-09 PROCEDURE — 85610 PROTHROMBIN TIME: CPT | Performed by: REGISTERED NURSE

## 2024-12-09 PROCEDURE — 71045 X-RAY EXAM CHEST 1 VIEW: CPT

## 2024-12-09 PROCEDURE — 72192 CT PELVIS W/O DYE: CPT | Performed by: RADIOLOGY

## 2024-12-09 PROCEDURE — 2500000004 HC RX 250 GENERAL PHARMACY W/ HCPCS (ALT 636 FOR OP/ED): Performed by: NURSE PRACTITIONER

## 2024-12-09 PROCEDURE — 2500000001 HC RX 250 WO HCPCS SELF ADMINISTERED DRUGS (ALT 637 FOR MEDICARE OP): Performed by: REGISTERED NURSE

## 2024-12-09 PROCEDURE — 86900 BLOOD TYPING SEROLOGIC ABO: CPT | Performed by: REGISTERED NURSE

## 2024-12-09 PROCEDURE — 73502 X-RAY EXAM HIP UNI 2-3 VIEWS: CPT | Mod: LT

## 2024-12-09 RX ORDER — ACETAMINOPHEN 650 MG/1
650 SUPPOSITORY RECTAL EVERY 4 HOURS PRN
Status: DISCONTINUED | OUTPATIENT
Start: 2024-12-09 | End: 2024-12-11 | Stop reason: HOSPADM

## 2024-12-09 RX ORDER — ACETAMINOPHEN 325 MG/1
650 TABLET ORAL EVERY 4 HOURS PRN
Status: DISCONTINUED | OUTPATIENT
Start: 2024-12-09 | End: 2024-12-11 | Stop reason: HOSPADM

## 2024-12-09 RX ORDER — DOCUSATE SODIUM 100 MG/1
100 CAPSULE, LIQUID FILLED ORAL 2 TIMES DAILY
Status: DISCONTINUED | OUTPATIENT
Start: 2024-12-09 | End: 2024-12-11 | Stop reason: HOSPADM

## 2024-12-09 RX ORDER — ONDANSETRON HYDROCHLORIDE 2 MG/ML
4 INJECTION, SOLUTION INTRAVENOUS EVERY 8 HOURS PRN
Status: DISCONTINUED | OUTPATIENT
Start: 2024-12-09 | End: 2024-12-11 | Stop reason: HOSPADM

## 2024-12-09 RX ORDER — PANTOPRAZOLE SODIUM 40 MG/1
40 TABLET, DELAYED RELEASE ORAL DAILY
Status: DISCONTINUED | OUTPATIENT
Start: 2024-12-10 | End: 2024-12-10

## 2024-12-09 RX ORDER — ENOXAPARIN SODIUM 100 MG/ML
40 INJECTION SUBCUTANEOUS EVERY 24 HOURS
Status: DISCONTINUED | OUTPATIENT
Start: 2024-12-10 | End: 2024-12-11 | Stop reason: HOSPADM

## 2024-12-09 RX ORDER — ACETAMINOPHEN 500 MG
5 TABLET ORAL NIGHTLY PRN
Status: DISCONTINUED | OUTPATIENT
Start: 2024-12-09 | End: 2024-12-11 | Stop reason: HOSPADM

## 2024-12-09 RX ORDER — OXYCODONE AND ACETAMINOPHEN 5; 325 MG/1; MG/1
1 TABLET ORAL EVERY 6 HOURS PRN
Status: DISCONTINUED | OUTPATIENT
Start: 2024-12-09 | End: 2024-12-10

## 2024-12-09 RX ORDER — ONDANSETRON 4 MG/1
4 TABLET, FILM COATED ORAL EVERY 8 HOURS PRN
Status: DISCONTINUED | OUTPATIENT
Start: 2024-12-09 | End: 2024-12-11 | Stop reason: HOSPADM

## 2024-12-09 RX ORDER — MORPHINE SULFATE 2 MG/ML
2 INJECTION, SOLUTION INTRAMUSCULAR; INTRAVENOUS EVERY 4 HOURS PRN
Status: DISCONTINUED | OUTPATIENT
Start: 2024-12-09 | End: 2024-12-10

## 2024-12-09 RX ORDER — ACETAMINOPHEN 160 MG/5ML
650 SOLUTION ORAL EVERY 4 HOURS PRN
Status: DISCONTINUED | OUTPATIENT
Start: 2024-12-09 | End: 2024-12-11 | Stop reason: HOSPADM

## 2024-12-09 RX ORDER — PANTOPRAZOLE SODIUM 40 MG/10ML
40 INJECTION, POWDER, LYOPHILIZED, FOR SOLUTION INTRAVENOUS DAILY
Status: DISCONTINUED | OUTPATIENT
Start: 2024-12-10 | End: 2024-12-10

## 2024-12-09 RX ORDER — OXYCODONE AND ACETAMINOPHEN 5; 325 MG/1; MG/1
1 TABLET ORAL ONCE
Status: COMPLETED | OUTPATIENT
Start: 2024-12-09 | End: 2024-12-09

## 2024-12-09 SDOH — SOCIAL STABILITY: SOCIAL INSECURITY: HAVE YOU HAD ANY THOUGHTS OF HARMING ANYONE ELSE?: NO

## 2024-12-09 SDOH — SOCIAL STABILITY: SOCIAL INSECURITY: HAS ANYONE EVER THREATENED TO HURT YOUR FAMILY OR YOUR PETS?: NO

## 2024-12-09 SDOH — SOCIAL STABILITY: SOCIAL INSECURITY: WITHIN THE LAST YEAR, HAVE YOU BEEN AFRAID OF YOUR PARTNER OR EX-PARTNER?: NO

## 2024-12-09 SDOH — ECONOMIC STABILITY: FOOD INSECURITY: HOW HARD IS IT FOR YOU TO PAY FOR THE VERY BASICS LIKE FOOD, HOUSING, MEDICAL CARE, AND HEATING?: VERY HARD

## 2024-12-09 SDOH — ECONOMIC STABILITY: HOUSING INSECURITY: AT ANY TIME IN THE PAST 12 MONTHS, WERE YOU HOMELESS OR LIVING IN A SHELTER (INCLUDING NOW)?: NO

## 2024-12-09 SDOH — SOCIAL STABILITY: SOCIAL INSECURITY
WITHIN THE LAST YEAR, HAVE YOU BEEN RAPED OR FORCED TO HAVE ANY KIND OF SEXUAL ACTIVITY BY YOUR PARTNER OR EX-PARTNER?: NO

## 2024-12-09 SDOH — ECONOMIC STABILITY: HOUSING INSECURITY: IN THE LAST 12 MONTHS, WAS THERE A TIME WHEN YOU WERE NOT ABLE TO PAY THE MORTGAGE OR RENT ON TIME?: YES

## 2024-12-09 SDOH — SOCIAL STABILITY: SOCIAL INSECURITY: WITHIN THE LAST YEAR, HAVE YOU BEEN HUMILIATED OR EMOTIONALLY ABUSED IN OTHER WAYS BY YOUR PARTNER OR EX-PARTNER?: NO

## 2024-12-09 SDOH — SOCIAL STABILITY: SOCIAL INSECURITY: ABUSE: ADULT

## 2024-12-09 SDOH — ECONOMIC STABILITY: HOUSING INSECURITY: IN THE PAST 12 MONTHS, HOW MANY TIMES HAVE YOU MOVED WHERE YOU WERE LIVING?: 0

## 2024-12-09 SDOH — SOCIAL STABILITY: SOCIAL INSECURITY: DO YOU FEEL ANYONE HAS EXPLOITED OR TAKEN ADVANTAGE OF YOU FINANCIALLY OR OF YOUR PERSONAL PROPERTY?: NO

## 2024-12-09 SDOH — SOCIAL STABILITY: SOCIAL INSECURITY: HAVE YOU HAD THOUGHTS OF HARMING ANYONE ELSE?: NO

## 2024-12-09 SDOH — SOCIAL STABILITY: SOCIAL INSECURITY: ARE THERE ANY APPARENT SIGNS OF INJURIES/BEHAVIORS THAT COULD BE RELATED TO ABUSE/NEGLECT?: NO

## 2024-12-09 SDOH — ECONOMIC STABILITY: TRANSPORTATION INSECURITY: IN THE PAST 12 MONTHS, HAS LACK OF TRANSPORTATION KEPT YOU FROM MEDICAL APPOINTMENTS OR FROM GETTING MEDICATIONS?: YES

## 2024-12-09 SDOH — ECONOMIC STABILITY: INCOME INSECURITY
IN THE PAST 12 MONTHS HAS THE ELECTRIC, GAS, OIL, OR WATER COMPANY THREATENED TO SHUT OFF SERVICES IN YOUR HOME?: PATIENT DECLINED

## 2024-12-09 SDOH — SOCIAL STABILITY: SOCIAL INSECURITY: DO YOU FEEL UNSAFE GOING BACK TO THE PLACE WHERE YOU ARE LIVING?: NO

## 2024-12-09 SDOH — SOCIAL STABILITY: SOCIAL INSECURITY: DOES ANYONE TRY TO KEEP YOU FROM HAVING/CONTACTING OTHER FRIENDS OR DOING THINGS OUTSIDE YOUR HOME?: NO

## 2024-12-09 SDOH — ECONOMIC STABILITY: FOOD INSECURITY: WITHIN THE PAST 12 MONTHS, THE FOOD YOU BOUGHT JUST DIDN'T LAST AND YOU DIDN'T HAVE MONEY TO GET MORE.: PATIENT DECLINED

## 2024-12-09 SDOH — SOCIAL STABILITY: SOCIAL INSECURITY: WERE YOU ABLE TO COMPLETE ALL THE BEHAVIORAL HEALTH SCREENINGS?: YES

## 2024-12-09 SDOH — ECONOMIC STABILITY: FOOD INSECURITY
WITHIN THE PAST 12 MONTHS, YOU WORRIED THAT YOUR FOOD WOULD RUN OUT BEFORE YOU GOT THE MONEY TO BUY MORE.: PATIENT DECLINED

## 2024-12-09 SDOH — SOCIAL STABILITY: SOCIAL INSECURITY: ARE YOU OR HAVE YOU BEEN THREATENED OR ABUSED PHYSICALLY, EMOTIONALLY, OR SEXUALLY BY ANYONE?: NO

## 2024-12-09 ASSESSMENT — LIFESTYLE VARIABLES
HOW OFTEN DO YOU HAVE A DRINK CONTAINING ALCOHOL: NEVER
HAVE YOU EVER FELT YOU SHOULD CUT DOWN ON YOUR DRINKING: NO
EVER HAD A DRINK FIRST THING IN THE MORNING TO STEADY YOUR NERVES TO GET RID OF A HANGOVER: NO
AUDIT-C TOTAL SCORE: 0
SKIP TO QUESTIONS 9-10: 1
HAVE PEOPLE ANNOYED YOU BY CRITICIZING YOUR DRINKING: NO
EVER FELT BAD OR GUILTY ABOUT YOUR DRINKING: NO
HOW MANY STANDARD DRINKS CONTAINING ALCOHOL DO YOU HAVE ON A TYPICAL DAY: PATIENT DOES NOT DRINK
TOTAL SCORE: 0
AUDIT-C TOTAL SCORE: 0
HOW OFTEN DO YOU HAVE 6 OR MORE DRINKS ON ONE OCCASION: NEVER
SUBSTANCE_ABUSE_PAST_12_MONTHS: NO

## 2024-12-09 ASSESSMENT — COGNITIVE AND FUNCTIONAL STATUS - GENERAL
PATIENT BASELINE BEDBOUND: NO
MOBILITY SCORE: 13
WALKING IN HOSPITAL ROOM: A LOT
MOVING TO AND FROM BED TO CHAIR: A LOT
MOVING FROM LYING ON BACK TO SITTING ON SIDE OF FLAT BED WITH BEDRAILS: A LITTLE
TURNING FROM BACK TO SIDE WHILE IN FLAT BAD: A LOT
DAILY ACTIVITIY SCORE: 21
TOILETING: A LOT
CLIMB 3 TO 5 STEPS WITH RAILING: A LOT
STANDING UP FROM CHAIR USING ARMS: A LOT
HELP NEEDED FOR BATHING: A LITTLE

## 2024-12-09 ASSESSMENT — PAIN SCALES - GENERAL
PAINLEVEL_OUTOF10: 10 - WORST POSSIBLE PAIN
PAINLEVEL_OUTOF10: 9
PAINLEVEL_OUTOF10: 7

## 2024-12-09 ASSESSMENT — PATIENT HEALTH QUESTIONNAIRE - PHQ9
SUM OF ALL RESPONSES TO PHQ9 QUESTIONS 1 & 2: 4
1. LITTLE INTEREST OR PLEASURE IN DOING THINGS: MORE THAN HALF THE DAYS
2. FEELING DOWN, DEPRESSED OR HOPELESS: MORE THAN HALF THE DAYS

## 2024-12-09 ASSESSMENT — PAIN - FUNCTIONAL ASSESSMENT: PAIN_FUNCTIONAL_ASSESSMENT: 0-10

## 2024-12-09 ASSESSMENT — COLUMBIA-SUICIDE SEVERITY RATING SCALE - C-SSRS
1. IN THE PAST MONTH, HAVE YOU WISHED YOU WERE DEAD OR WISHED YOU COULD GO TO SLEEP AND NOT WAKE UP?: NO
6. HAVE YOU EVER DONE ANYTHING, STARTED TO DO ANYTHING, OR PREPARED TO DO ANYTHING TO END YOUR LIFE?: NO
2. HAVE YOU ACTUALLY HAD ANY THOUGHTS OF KILLING YOURSELF?: NO

## 2024-12-09 ASSESSMENT — ACTIVITIES OF DAILY LIVING (ADL): LACK_OF_TRANSPORTATION: YES

## 2024-12-09 NOTE — ED PROVIDER NOTES
"HPI   Chief Complaint   Patient presents with    Tailbone Pain     \"I am having tailbone pain on the left side. Friday fell on my tailbone on a curb.\"        HPI  64-year-old female with past medical history significant for left total hip arthroplasty 2009, alcoholic gastritis, cirrhosis and osteoporosis presents to the emergency department with chief complaint of left groin pain following a fall 3 days ago.  Patient states she was recently recovering from a closed fracture of the left hip due to fall that occurred 10/04/2024.  She states that this recent fall occurred 3 days ago on Friday when she was walking and believes she slipped on black ice and fell onto a curb, onto her buttocks.  She states that her pain is 10 out of 10 predominantly in the left groin area, and the pain radiates down both of her legs.  She describes it as sharp.  She tried taking tramadol last night, without relief.  She is also experiencing a headache, which she attributes to the pain.  Patient denies chest pain, loss of consciousness, hitting her head, shortness of breath, back pain different from baseline, stool urinary incontinence, urinary symptoms, and abdominal pain.  Denies other injury from the fall.      Patient History   Past Medical History:   Diagnosis Date    Abnormal mammogram 06/2022    left cat 5--invasive ductal carcinoma    Alcoholic gastritis without bleeding     Cirrhosis (Multi)     Esophageal varices in alcoholic cirrhosis (Multi)     History of mammogram 08/03/2024    cat 2    Pap test, as part of routine gynecological examination 01/2015    wnl, hpv neg     Past Surgical History:   Procedure Laterality Date    BARIATRIC SURGERY  2000    BREAST LUMPECTOMY      CHOLECYSTECTOMY  2010    TONSILLECTOMY  1964    with adenoidectomy    TOTAL HIP ARTHROPLASTY Left 2009     Family History   Problem Relation Name Age of Onset    Cancer Mother          uterine    Cancer Father          TESTICAL    Diabetes type II Father      " Diabetes Father      Heart disease Father       Social History     Tobacco Use    Smoking status: Every Day     Types: Cigarettes    Smokeless tobacco: Never   Vaping Use    Vaping status: Never Used   Substance Use Topics    Alcohol use: Not Currently     Comment: h/o abuse    Drug use: Yes     Types: Marijuana, Methamphetamines       Physical Exam   ED Triage Vitals [12/09/24 1445]   Temperature Heart Rate Respirations BP   36.3 °C (97.3 °F) 89 16 147/79      Pulse Ox Temp Source Heart Rate Source Patient Position   97 % Temporal Monitor Sitting      BP Location FiO2 (%)     Right arm --       Physical Exam  Vitals and nursing note reviewed.   Constitutional:       General: She is not in acute distress.     Appearance: Normal appearance. She is not ill-appearing, toxic-appearing or diaphoretic.   Cardiovascular:      Rate and Rhythm: Normal rate and regular rhythm.      Pulses: Normal pulses.      Heart sounds: Normal heart sounds. No murmur heard.     No friction rub. No gallop.   Pulmonary:      Effort: Pulmonary effort is normal. No respiratory distress.      Breath sounds: Normal breath sounds.   Chest:      Chest wall: No tenderness.   Abdominal:      General: Abdomen is flat. Bowel sounds are normal.      Tenderness: There is no abdominal tenderness. There is no right CVA tenderness or left CVA tenderness.   Musculoskeletal:         General: No swelling.      Cervical back: Normal range of motion. No rigidity or tenderness.      Right hip: No tenderness.      Left hip: Tenderness and bony tenderness present. No deformity. Decreased range of motion. Decreased strength.      Right lower leg: No edema.      Left lower leg: No edema.   Skin:     Capillary Refill: Capillary refill takes less than 2 seconds.   Neurological:      General: No focal deficit present.      Mental Status: She is alert and oriented to person, place, and time.      Sensory: No sensory deficit.      Deep Tendon Reflexes: Reflexes normal.    Psychiatric:         Mood and Affect: Mood normal.         Behavior: Behavior normal.       Labs Reviewed   CBC WITH AUTO DIFFERENTIAL - Abnormal       Result Value    WBC 3.6 (*)     nRBC 0.0      RBC 4.11      Hemoglobin 12.3      Hematocrit 37.3      MCV 91      MCH 29.9      MCHC 33.0      RDW 13.4      Platelets 132 (*)     Neutrophils % 56.9      Immature Granulocytes %, Automated 0.0      Lymphocytes % 30.1      Monocytes % 9.6      Eosinophils % 3.1      Basophils % 0.3      Neutrophils Absolute 2.03      Immature Granulocytes Absolute, Automated 0.00      Lymphocytes Absolute 1.07 (*)     Monocytes Absolute 0.34      Eosinophils Absolute 0.11      Basophils Absolute 0.01     COMPREHENSIVE METABOLIC PANEL - Abnormal    Glucose 98      Sodium 140      Potassium 3.7      Chloride 104      Bicarbonate 28      Anion Gap 12      Urea Nitrogen 21      Creatinine 0.70      eGFR >90      Calcium 9.1      Albumin 3.7      Alkaline Phosphatase 131      Total Protein 6.3 (*)     AST 95 (*)     Bilirubin, Total 0.8       (*)    COMPREHENSIVE METABOLIC PANEL - Abnormal    Glucose 135 (*)     Sodium 140      Potassium 3.7      Chloride 105      Bicarbonate 29      Anion Gap 10      Urea Nitrogen 18      Creatinine 0.63      eGFR >90      Calcium 8.4 (*)     Albumin 3.2 (*)     Alkaline Phosphatase 163 (*)     Total Protein 5.6 (*)      (*)     Bilirubin, Total 1.5 (*)      (*)    CBC WITH AUTO DIFFERENTIAL - Abnormal    WBC 2.2 (*)     nRBC 0.0      RBC 4.16      Hemoglobin 12.1      Hematocrit 38.0      MCV 91      MCH 29.1      MCHC 31.8 (*)     RDW 13.4      Platelets 146 (*)     Neutrophils % 46.5      Immature Granulocytes %, Automated 0.5      Lymphocytes % 39.3      Monocytes % 9.1      Eosinophils % 3.7      Basophils % 0.9      Neutrophils Absolute 1.02 (*)     Immature Granulocytes Absolute, Automated 0.01      Lymphocytes Absolute 0.86 (*)     Monocytes Absolute 0.20      Eosinophils  Absolute 0.08      Basophils Absolute 0.02     BILIRUBIN, DIRECT - Abnormal    Bilirubin, Direct 0.7 (*)    COMPREHENSIVE METABOLIC PANEL - Abnormal    Glucose 83      Sodium 139      Potassium 4.2      Chloride 104      Bicarbonate 30      Anion Gap 9 (*)     Urea Nitrogen 20      Creatinine 0.74      eGFR 90      Calcium 8.4 (*)     Albumin 3.1 (*)     Alkaline Phosphatase 171 (*)     Total Protein 5.4 (*)      (*)     Bilirubin, Total 0.7       (*)    CBC WITH AUTO DIFFERENTIAL - Abnormal    WBC 3.2 (*)     nRBC 0.0      RBC 4.09      Hemoglobin 12.1      Hematocrit 37.7      MCV 92      MCH 29.6      MCHC 32.1      RDW 13.5      Platelets 179      Neutrophils % 52.1      Immature Granulocytes %, Automated 0.3      Lymphocytes % 33.4      Monocytes % 9.6      Eosinophils % 3.7      Basophils % 0.9      Neutrophils Absolute 1.68      Immature Granulocytes Absolute, Automated 0.01      Lymphocytes Absolute 1.08 (*)     Monocytes Absolute 0.31      Eosinophils Absolute 0.12      Basophils Absolute 0.03     KALYAN-BARR VIRUS ANTIBODY PANEL - Abnormal    EBV VCA, IgG  Positive (*)     EBV VCA, IgM  Negative      EBV Early Antigen Antibody, IgG Positive (*)     EBV Nuclear Antigen Antibody, IgG Positive (*)     Narrative:     EBV INTERPRETATION CHART     PRIMARY ACUTE   VCA-IGG: +/-   VCA-IGM: +/-   EA-IGG:  +/-   NA-IGG:   -     LATE ACUTE   VCA-IGG:  +   VCA-IGM: +/-   EA-IGG:  +/-   NA-IGG:  +/-     RECOVERING   VCA-IGG:  +   VCA-IGM:  -   EA-IGG:   +  NA-IGG:   -     PREVIOUS INFECTION   VCA-IGG:  +   VCA-IGM:  -   EA-IGG:   -  NA-IGG:  +/-      PROTIME-INR - Normal    Protime 11.9      INR 1.1     MAGNESIUM - Normal    Magnesium 1.80     HEPATITIS PANEL, ACUTE - Normal    Hepatitis B Surface AG Nonreactive      Hepatitis A  AB- IgM Nonreactive      Hepatitis B Core AB; IgM Nonreactive      Hepatitis C AB Nonreactive     MAGNESIUM - Normal    Magnesium 1.82     ACUTE TOXICOLOGY PANEL, BLOOD - Normal     Acetaminophen <10.0      Salicylate  <3      Alcohol <10     HSV BY PCR QUAL WHOLE BLOOD - Normal    HSV-1 Whole Blood Not Detected      HSV-2 Whole blood Not Detected      Narrative:     The Diasorin Simplexa™ Herpes Simplex 1 & 2 Virus DNA Qualitative test is a PCR assay that detects and differentiates between HSV-1 and HSV-2. This assay is intended for use as an aid in diagnosis of HSV infection in symptomatic patients who are suspected to have disseminated disease, or as a screening assay for neonates suspected to have been exposed to HSV in the peripartum period. A negative result does not preclude the possibility of HSV infection and should not be used as the sole basis for clinical diagnosis, treatment, or other patient management decisions. This test was developed and its performance characteristics were determined by the [facility], Department of Pathology, [facility], [place], [place].  It has not been cleared or approved by the US Food and Drug Administration; however, FDA clearance or approval is not currently required for clinical use. It should not be regarded as investigational or for research purposes.   TYPE AND SCREEN    ABO TYPE O      Rh TYPE POS      ANTIBODY SCREEN NEG     HEPATITIS C RNA, QUANTITATIVE, PCR    Hepatitis C RNA PCR Log        HCV RNA Result Not Detected      Narrative:     Reportable Range: ,000,000 IU/mL.    The andrzej HCV is an in vitro nucleic acid amplification test for both the detection and quantitation of hepatitis C virus (HCV) RNA, in human EDTA plasma or serum, of HCV antibody positive or HCV-infected individuals on the andrzej 6800/8800 Systems. Dual probes are used to detect and quantify, but not  discriminate HCV genotypes 1-6. Though rare, mutations within the highly conserved regions of a viral genome covered by andrzej HCV may affect primer and/or probe binding resulting in the under-quantitation of  "virus or failure to detect the presence of virus.    The analytical quantification range of this assay has been determined to be 15 to 100,000,000 IU/ml in plasma. If the assay DETECTED the presence of the virus but was not able to accurately quantify the number of copies, the test result will be reported as \"<15 Detected\" or \">100,000,000 Detected\".    The andrzej® HCV is intended for use as an aid in the diagnosis of HCV infection in the following populations: individuals with antibody evidence of HCV with evidence of liver disease, individuals suspected to be actively infected with HCV antibody evidence, and individuals at risk for HCV infection with antibodies to HCV. Detection of HCV RNA indicates that the virus is replicating and therefore is evidence of active infection. The andrzej HCV is intended for use as an aid in the management of HCV infected patients undergoing anti-viral therapy. The assay can be used to measure HCV RNA levels at baseline, during treatment, at the end of treatment, and at the end of follow up of treatment to  determine sustained or non-sustained viral response. The results must be interpreted within the context of all relevant clinical and laboratory findings.     This test is approved by the US Food and Drug Administration, and its performance characteristics verified by the [facility], Department of Pathology, [facility].            US right upper quadrant   Final Result   Status post cholecystectomy.        Heterogeneous parenchyma could be related to cirrhosis. No evidence   of upper abdominal ascites.             MACRO:   None.        Signed by: Rigoberto Chilel 12/10/2024 6:19 PM   Dictation workstation:   [id number]      XR chest 1 view   Final Result   1.  Emphysema/COPD with atelectasis/scarring.                  MACRO:   None        Signed by: Kelley Cope 12/9/2024 7:15 PM   Dictation workstation:   [id number]    "   CT pelvis wo IV contrast   Final Result   1.  Fractures of the left superior and inferior pubic symphysis as   described.   2. Left hip prostheses.   3. Partially limited evaluation.        MACRO:   None        Signed by: Kelley Cope 12/9/2024 7:28 PM   Dictation workstation:   [id number]      XR hip left with pelvis when performed 2 or 3 views   Final Result   Acute fracture of the left superior and possibly inferior, pubic   ramus suspected. CT of the pelvis is recommended for further   evaluation.             MACRO:   None.        Signed by: Rigoberto Chilel 12/9/2024 5:09 PM   Dictation workstation:   [id number]          ED Course & MDM   ED Course as of 12/14/24 0755   Mon Dec 09, 2024   2130 2048 hrs.: EKG personally interpreted by myself shows sinus rhythm with a ventricular rate of 78 bpm.  QRS in 138 ms.  QTc 503 ms.  Left axis deviation noted.  No acute ischemic injury pattern noted. [NW]      ED Course User Index  [NW] Manohar Newman,          Diagnoses as of 12/14/24 0755   Closed fracture of superior ramus of left pubis, initial encounter (Multi)   Fall, initial encounter   Ambulatory dysfunction       No data recorded     Alejandro Coma Scale Score: 15 (12/09/24 2238 : Ivett Sebastian LPN)                   Medical Decision Making  64-year-old female with past medical history significant for left total hip arthroplasty 2009, alcoholic gastritis, cirrhosis and osteoporosis presents to the emergency department with chief complaint of groin pain following a fall 3 days ago.  Vital signs stable    Initial differentials considered include pelvis fracture, fracture dislocation, and contusion.  On initial evaluation patient is nontoxic appearing, in no acute distress, sitting in a wheelchair. Physical exam demonstrated left groin and greater trochanter tenderness to palpation.  Breath sounds normal.  Heart sounds normal with regular rate and rhythm.  No spinal point tenderness or paraspinal  tenderness.  No CVA tenderness.  No abdominal tenderness.  Extremities neurovascularly intact.  Given history and physical exam x-rays of the left hip with pelvis were ordered.  Patient given Percocet for pain.    Patient handed off to Amna Moreno CNP at 1700.     Fransisco Smith PA-C  12/09/24 1702       Fransisco Smith PA-C  12/14/24 1417

## 2024-12-09 NOTE — PROGRESS NOTES
"Emergency Medicine Transition of Care Note.    I received Antonieta Boswell in signout from ROBERTA Fajardo. please see the previous ED provider note for all HPI, PE and MDM up to the time of signout at 1630. This is in addition to the primary record.    In brief Antonieta Boswell is an 64 y.o. female presenting for   Chief Complaint   Patient presents with    Tailbone Pain     \"I am having tailbone pain on the left side. Friday fell on my tailbone on a curb.\"      At the time of signout we were awaiting: Results.    Diagnoses as of 12/09/24 2012   Closed fracture of superior ramus of left pubis, initial encounter (Multi)   Fall, initial encounter   Ambulatory dysfunction       Medical Decision Making  Imaging of pelvis is significant for left superior pubic Lady fracture and possible instability inferior as well.  I did go discussed admission versus discharge with patient.  Patient tells me as she recently had a hip fracture back in  10/4/2024 and just got out of skilled nursing facility she is not comfortable being discharged home at this time.  Additionally she tells me that she is too many stairs that she has to continue to use the restroom.  Patient would like to be admitted to the hospital for pain management and physical therapy with plan to place back in skilled nursing facility.  I did add on a CT as well as labs for admission.  Will then reach out to the hospitalist team    CT confirms that there is a acute fracture of the left superior and inferior pubic rami.    I did reach out to the hospitalist team and discussed patient with De MANCILLA who is on-call tonight.  She accepts patient for admission at this time and is aware the patient is requesting PT OT and skilled nursing placement.  Final diagnoses:   [S32.512A] Closed fracture of superior ramus of left pubis, initial encounter (Multi)   [W19.XXXA] Fall, initial encounter   [R26.2] Ambulatory dysfunction           Procedure  Procedures    Amna RAMIREZ" Tiffanie, ANUPAM-CNP

## 2024-12-10 ENCOUNTER — APPOINTMENT (OUTPATIENT)
Dept: RADIOLOGY | Facility: HOSPITAL | Age: 64
DRG: 536 | End: 2024-12-10
Payer: MEDICARE

## 2024-12-10 LAB
ALBUMIN SERPL BCP-MCNC: 3.2 G/DL (ref 3.4–5)
ALP SERPL-CCNC: 163 U/L (ref 33–136)
ALT SERPL W P-5'-P-CCNC: 185 U/L (ref 7–45)
ANION GAP SERPL CALC-SCNC: 10 MMOL/L (ref 10–20)
AST SERPL W P-5'-P-CCNC: 284 U/L (ref 9–39)
BASOPHILS # BLD AUTO: 0.02 X10*3/UL (ref 0–0.1)
BASOPHILS NFR BLD AUTO: 0.9 %
BILIRUB DIRECT SERPL-MCNC: 0.7 MG/DL (ref 0–0.3)
BILIRUB SERPL-MCNC: 1.5 MG/DL (ref 0–1.2)
BUN SERPL-MCNC: 18 MG/DL (ref 6–23)
CALCIUM SERPL-MCNC: 8.4 MG/DL (ref 8.6–10.3)
CHLORIDE SERPL-SCNC: 105 MMOL/L (ref 98–107)
CO2 SERPL-SCNC: 29 MMOL/L (ref 21–32)
CREAT SERPL-MCNC: 0.63 MG/DL (ref 0.5–1.05)
EGFRCR SERPLBLD CKD-EPI 2021: >90 ML/MIN/1.73M*2
EOSINOPHIL # BLD AUTO: 0.08 X10*3/UL (ref 0–0.7)
EOSINOPHIL NFR BLD AUTO: 3.7 %
ERYTHROCYTE [DISTWIDTH] IN BLOOD BY AUTOMATED COUNT: 13.4 % (ref 11.5–14.5)
GLUCOSE SERPL-MCNC: 135 MG/DL (ref 74–99)
HAV IGM SER QL: NONREACTIVE
HBV CORE IGM SER QL: NONREACTIVE
HBV SURFACE AG SERPL QL IA: NONREACTIVE
HCT VFR BLD AUTO: 38 % (ref 36–46)
HCV AB SER QL: NONREACTIVE
HGB BLD-MCNC: 12.1 G/DL (ref 12–16)
IMM GRANULOCYTES # BLD AUTO: 0.01 X10*3/UL (ref 0–0.7)
IMM GRANULOCYTES NFR BLD AUTO: 0.5 % (ref 0–0.9)
LYMPHOCYTES # BLD AUTO: 0.86 X10*3/UL (ref 1.2–4.8)
LYMPHOCYTES NFR BLD AUTO: 39.3 %
MAGNESIUM SERPL-MCNC: 1.8 MG/DL (ref 1.6–2.4)
MCH RBC QN AUTO: 29.1 PG (ref 26–34)
MCHC RBC AUTO-ENTMCNC: 31.8 G/DL (ref 32–36)
MCV RBC AUTO: 91 FL (ref 80–100)
MONOCYTES # BLD AUTO: 0.2 X10*3/UL (ref 0.1–1)
MONOCYTES NFR BLD AUTO: 9.1 %
NEUTROPHILS # BLD AUTO: 1.02 X10*3/UL (ref 1.2–7.7)
NEUTROPHILS NFR BLD AUTO: 46.5 %
NRBC BLD-RTO: 0 /100 WBCS (ref 0–0)
PLATELET # BLD AUTO: 146 X10*3/UL (ref 150–450)
POTASSIUM SERPL-SCNC: 3.7 MMOL/L (ref 3.5–5.3)
PROT SERPL-MCNC: 5.6 G/DL (ref 6.4–8.2)
RBC # BLD AUTO: 4.16 X10*6/UL (ref 4–5.2)
SODIUM SERPL-SCNC: 140 MMOL/L (ref 136–145)
WBC # BLD AUTO: 2.2 X10*3/UL (ref 4.4–11.3)

## 2024-12-10 PROCEDURE — 1100000001 HC PRIVATE ROOM DAILY

## 2024-12-10 PROCEDURE — 36415 COLL VENOUS BLD VENIPUNCTURE: CPT | Performed by: INTERNAL MEDICINE

## 2024-12-10 PROCEDURE — 2500000005 HC RX 250 GENERAL PHARMACY W/O HCPCS: Performed by: NURSE PRACTITIONER

## 2024-12-10 PROCEDURE — 97165 OT EVAL LOW COMPLEX 30 MIN: CPT | Mod: GO

## 2024-12-10 PROCEDURE — 76705 ECHO EXAM OF ABDOMEN: CPT | Performed by: STUDENT IN AN ORGANIZED HEALTH CARE EDUCATION/TRAINING PROGRAM

## 2024-12-10 PROCEDURE — 99221 1ST HOSP IP/OBS SF/LOW 40: CPT | Performed by: ORTHOPAEDIC SURGERY

## 2024-12-10 PROCEDURE — 82248 BILIRUBIN DIRECT: CPT | Performed by: INTERNAL MEDICINE

## 2024-12-10 PROCEDURE — 83735 ASSAY OF MAGNESIUM: CPT | Performed by: NURSE PRACTITIONER

## 2024-12-10 PROCEDURE — 86665 EPSTEIN-BARR CAPSID VCA: CPT | Mod: ELYLAB | Performed by: NURSE PRACTITIONER

## 2024-12-10 PROCEDURE — 87529 HSV DNA AMP PROBE: CPT | Mod: ELYLAB | Performed by: NURSE PRACTITIONER

## 2024-12-10 PROCEDURE — 85025 COMPLETE CBC W/AUTO DIFF WBC: CPT | Performed by: NURSE PRACTITIONER

## 2024-12-10 PROCEDURE — 2500000001 HC RX 250 WO HCPCS SELF ADMINISTERED DRUGS (ALT 637 FOR MEDICARE OP): Performed by: NURSE PRACTITIONER

## 2024-12-10 PROCEDURE — 76705 ECHO EXAM OF ABDOMEN: CPT

## 2024-12-10 PROCEDURE — 2500000004 HC RX 250 GENERAL PHARMACY W/ HCPCS (ALT 636 FOR OP/ED): Performed by: NURSE PRACTITIONER

## 2024-12-10 PROCEDURE — 86803 HEPATITIS C AB TEST: CPT | Mod: ELYLAB | Performed by: INTERNAL MEDICINE

## 2024-12-10 PROCEDURE — 27197 CLSD TX PELVIC RING FX: CPT | Performed by: ORTHOPAEDIC SURGERY

## 2024-12-10 PROCEDURE — 99221 1ST HOSP IP/OBS SF/LOW 40: CPT | Performed by: NURSE PRACTITIONER

## 2024-12-10 PROCEDURE — 84075 ASSAY ALKALINE PHOSPHATASE: CPT | Performed by: NURSE PRACTITIONER

## 2024-12-10 PROCEDURE — 97161 PT EVAL LOW COMPLEX 20 MIN: CPT | Mod: GP | Performed by: PHYSICAL THERAPIST

## 2024-12-10 PROCEDURE — 2500000001 HC RX 250 WO HCPCS SELF ADMINISTERED DRUGS (ALT 637 FOR MEDICARE OP): Performed by: INTERNAL MEDICINE

## 2024-12-10 PROCEDURE — 2500000004 HC RX 250 GENERAL PHARMACY W/ HCPCS (ALT 636 FOR OP/ED): Performed by: INTERNAL MEDICINE

## 2024-12-10 PROCEDURE — 99232 SBSQ HOSP IP/OBS MODERATE 35: CPT | Performed by: INTERNAL MEDICINE

## 2024-12-10 PROCEDURE — 36415 COLL VENOUS BLD VENIPUNCTURE: CPT | Performed by: NURSE PRACTITIONER

## 2024-12-10 RX ORDER — CYCLOBENZAPRINE HCL 10 MG
5 TABLET ORAL 3 TIMES DAILY PRN
Status: DISCONTINUED | OUTPATIENT
Start: 2024-12-10 | End: 2024-12-11 | Stop reason: HOSPADM

## 2024-12-10 RX ORDER — OXYCODONE AND ACETAMINOPHEN 5; 325 MG/1; MG/1
1 TABLET ORAL EVERY 6 HOURS PRN
Status: DISCONTINUED | OUTPATIENT
Start: 2024-12-10 | End: 2024-12-11 | Stop reason: HOSPADM

## 2024-12-10 RX ORDER — HYDROCHLOROTHIAZIDE 12.5 MG/1
12.5 TABLET ORAL DAILY
Status: DISCONTINUED | OUTPATIENT
Start: 2024-12-10 | End: 2024-12-11 | Stop reason: HOSPADM

## 2024-12-10 RX ORDER — MORPHINE SULFATE 2 MG/ML
2 INJECTION, SOLUTION INTRAMUSCULAR; INTRAVENOUS EVERY 4 HOURS PRN
Status: DISCONTINUED | OUTPATIENT
Start: 2024-12-10 | End: 2024-12-11 | Stop reason: HOSPADM

## 2024-12-10 SDOH — SOCIAL STABILITY: SOCIAL INSECURITY: WITHIN THE LAST YEAR, HAVE YOU BEEN AFRAID OF YOUR PARTNER OR EX-PARTNER?: NO

## 2024-12-10 SDOH — ECONOMIC STABILITY: FOOD INSECURITY: WITHIN THE PAST 12 MONTHS, THE FOOD YOU BOUGHT JUST DIDN'T LAST AND YOU DIDN'T HAVE MONEY TO GET MORE.: PATIENT DECLINED

## 2024-12-10 SDOH — SOCIAL STABILITY: SOCIAL INSECURITY
WITHIN THE LAST YEAR, HAVE YOU BEEN KICKED, HIT, SLAPPED, OR OTHERWISE PHYSICALLY HURT BY YOUR PARTNER OR EX-PARTNER?: NO

## 2024-12-10 SDOH — ECONOMIC STABILITY: HOUSING INSECURITY: AT ANY TIME IN THE PAST 12 MONTHS, WERE YOU HOMELESS OR LIVING IN A SHELTER (INCLUDING NOW)?: PATIENT DECLINED

## 2024-12-10 SDOH — ECONOMIC STABILITY: FOOD INSECURITY: HOW HARD IS IT FOR YOU TO PAY FOR THE VERY BASICS LIKE FOOD, HOUSING, MEDICAL CARE, AND HEATING?: PATIENT DECLINED

## 2024-12-10 SDOH — SOCIAL STABILITY: SOCIAL INSECURITY: WITHIN THE LAST YEAR, HAVE YOU BEEN HUMILIATED OR EMOTIONALLY ABUSED IN OTHER WAYS BY YOUR PARTNER OR EX-PARTNER?: NO

## 2024-12-10 SDOH — ECONOMIC STABILITY: HOUSING INSECURITY: IN THE PAST 12 MONTHS, HOW MANY TIMES HAVE YOU MOVED WHERE YOU WERE LIVING?: 0

## 2024-12-10 SDOH — ECONOMIC STABILITY: TRANSPORTATION INSECURITY
IN THE PAST 12 MONTHS, HAS LACK OF TRANSPORTATION KEPT YOU FROM MEDICAL APPOINTMENTS OR FROM GETTING MEDICATIONS?: PATIENT DECLINED

## 2024-12-10 SDOH — ECONOMIC STABILITY: HOUSING INSECURITY: IN THE LAST 12 MONTHS, WAS THERE A TIME WHEN YOU WERE NOT ABLE TO PAY THE MORTGAGE OR RENT ON TIME?: PATIENT DECLINED

## 2024-12-10 ASSESSMENT — PAIN SCALES - GENERAL
PAINLEVEL_OUTOF10: 7
PAINLEVEL_OUTOF10: 7
PAINLEVEL_OUTOF10: 8
PAINLEVEL_OUTOF10: 6
PAINLEVEL_OUTOF10: 8

## 2024-12-10 ASSESSMENT — COGNITIVE AND FUNCTIONAL STATUS - GENERAL
TURNING FROM BACK TO SIDE WHILE IN FLAT BAD: A LOT
TOILETING: A LOT
WALKING IN HOSPITAL ROOM: A LOT
HELP NEEDED FOR BATHING: A LOT
MOVING TO AND FROM BED TO CHAIR: A LOT
PERSONAL GROOMING: A LITTLE
TOILETING: A LOT
CLIMB 3 TO 5 STEPS WITH RAILING: A LOT
STANDING UP FROM CHAIR USING ARMS: A LOT
TURNING FROM BACK TO SIDE WHILE IN FLAT BAD: A LITTLE
DAILY ACTIVITIY SCORE: 21
WALKING IN HOSPITAL ROOM: A LOT
CLIMB 3 TO 5 STEPS WITH RAILING: TOTAL
MOVING FROM LYING ON BACK TO SITTING ON SIDE OF FLAT BED WITH BEDRAILS: A LITTLE
MOBILITY SCORE: 14
HELP NEEDED FOR BATHING: A LITTLE
DRESSING REGULAR UPPER BODY CLOTHING: A LITTLE
DAILY ACTIVITIY SCORE: 16
MOBILITY SCORE: 13
STANDING UP FROM CHAIR USING ARMS: A LOT
MOVING TO AND FROM BED TO CHAIR: A LITTLE
DRESSING REGULAR LOWER BODY CLOTHING: A LOT
MOVING FROM LYING ON BACK TO SITTING ON SIDE OF FLAT BED WITH BEDRAILS: A LITTLE

## 2024-12-10 ASSESSMENT — ACTIVITIES OF DAILY LIVING (ADL)
LACK_OF_TRANSPORTATION: NO
DRESSING YOURSELF: INDEPENDENT
WALKS IN HOME: NEEDS ASSISTANCE
TOILETING: NEEDS ASSISTANCE
ADEQUATE_TO_COMPLETE_ADL: YES
GROOMING: INDEPENDENT
FEEDING YOURSELF: INDEPENDENT
BATHING: NEEDS ASSISTANCE
JUDGMENT_ADEQUATE_SAFELY_COMPLETE_DAILY_ACTIVITIES: YES
HEARING - LEFT EAR: DIFFICULTY WITH NOISE
ASSISTIVE_DEVICE: WHEELCHAIR;CANE
LACK_OF_TRANSPORTATION: PATIENT DECLINED
BATHING_ASSISTANCE: MODERATE
HEARING - RIGHT EAR: DIFFICULTY WITH NOISE
LACK_OF_TRANSPORTATION: PATIENT DECLINED
PATIENT'S MEMORY ADEQUATE TO SAFELY COMPLETE DAILY ACTIVITIES?: YES

## 2024-12-10 ASSESSMENT — PAIN - FUNCTIONAL ASSESSMENT
PAIN_FUNCTIONAL_ASSESSMENT: 0-10
PAIN_FUNCTIONAL_ASSESSMENT: WONG-BAKER FACES
PAIN_FUNCTIONAL_ASSESSMENT: 0-10

## 2024-12-10 ASSESSMENT — PAIN SCALES - WONG BAKER: WONGBAKER_NUMERICALRESPONSE: HURTS LITTLE BIT

## 2024-12-10 NOTE — PROGRESS NOTES
Social Work Note Sent referral information in [facility] to [facility] per [facility] request.  Later rcvd msg from [facility] that they can accept.  Requested that direct auth be applied for with ADOD of 12/11 and auth was obtained.  ROSALINO Mendes

## 2024-12-10 NOTE — PROGRESS NOTES
"ASSESSMENT & PLAN:     Acute LEFT superior and inferior ramus fx  Mechanical fall on ice  Uncontrolled pain  Gait instability   - Left hip replacement in October - no complications  - Pain control: Toradol, Percocet, morphine  - trend LFT slight elevation , AST 95; no abd pain, no n/v/d  - hx of regular alcohol use - denies recent use   - Consult Ortho - appreciate recs  - PT/OT - placement  - [facility] - dc planning - placement, has multiple stairs and is unable to navigate home, difficulty eating and drinking due to over exhaustion and pain.    12/10/24  -ortho consulted  -pain control PRN  -elevated LFTs, freddie today. Were not elevated in the past. She does have nausea, nonspecific lower abd pain today. Says she has a known history of cirrhosis. Will get RUQ US, acute viral hep panel. GI consult.   -Has HTN. Says she has elevated Bps at home and prior doctor visits as well. Also gets minor ankle swelling sometimes. Will start low dose hydrochlorothiazide today.   -pt/ot eval pending, likely needs snf.   -vte ppx lovenox    Shawn Galo MD    SUBJECTIVE     NAEON. Still having L hip pain. Nauseous this morning. Nonspecific lower abd pain as well. Denies vomiting. Tolerating liquids.     OBJECTIVE:       Last Recorded Vitals:  Vitals:    12/09/24 1445 12/09/24 2239 12/10/24 0724   BP: 147/79 176/78 (!) 181/84   BP Location: Right arm Left arm    Patient Position: Sitting Sitting    Pulse: 89 80 68   Resp: 16 18 18   Temp: 36.3 °C (97.3 °F)  36.3 °C (97.3 °F)   TempSrc: Temporal     SpO2: 97% 97% 95%   Weight: 59.9 kg (132 lb)     Height: 1.473 m (4' 10\")         Last I/O:  No intake/output data recorded.    Physical Exam:  GEN: ill appearing, appears stated age, NAD  CV: RRR, no m/r/g, no LE edema  LUNGS: CTAB, no w/r/c  ABD: soft, NT, ND, NBS  SKIN: no rashes  NEURO: A+Ox3  PSYCH: appropriate mood, affect    Inpatient Medications:  docusate sodium, 100 mg, oral, BID  enoxaparin, 40 mg, subcutaneous, " q24h        PRN Medications  PRN medications: acetaminophen **OR** acetaminophen **OR** acetaminophen, melatonin, morphine, ondansetron **OR** ondansetron, oxyCODONE-acetaminophen    Continuous Medications:         LABS AND IMAGING:     Labs:  Results for orders placed or performed during the hospital encounter of 12/09/24 (from the past 24 hours)   CBC and Auto Differential   Result Value Ref Range    WBC 3.6 (L) 4.4 - 11.3 x10*3/uL    nRBC 0.0 0.0 - 0.0 /100 WBCs    RBC 4.11 4.00 - 5.20 x10*6/uL    Hemoglobin 12.3 12.0 - 16.0 g/dL    Hematocrit 37.3 36.0 - 46.0 %    MCV 91 80 - 100 fL    MCH 29.9 26.0 - 34.0 pg    MCHC 33.0 32.0 - 36.0 g/dL    RDW 13.4 11.5 - 14.5 %    Platelets 132 (L) 150 - 450 x10*3/uL    Neutrophils % 56.9 40.0 - 80.0 %    Immature Granulocytes %, Automated 0.0 0.0 - 0.9 %    Lymphocytes % 30.1 13.0 - 44.0 %    Monocytes % 9.6 2.0 - 10.0 %    Eosinophils % 3.1 0.0 - 6.0 %    Basophils % 0.3 0.0 - 2.0 %    Neutrophils Absolute 2.03 1.20 - 7.70 x10*3/uL    Immature Granulocytes Absolute, Automated 0.00 0.00 - 0.70 x10*3/uL    Lymphocytes Absolute 1.07 (L) 1.20 - 4.80 x10*3/uL    Monocytes Absolute 0.34 0.10 - 1.00 x10*3/uL    Eosinophils Absolute 0.11 0.00 - 0.70 x10*3/uL    Basophils Absolute 0.01 0.00 - 0.10 x10*3/uL   Comprehensive metabolic panel   Result Value Ref Range    Glucose 98 74 - 99 mg/dL    Sodium 140 136 - 145 mmol/L    Potassium 3.7 3.5 - 5.3 mmol/L    Chloride 104 98 - 107 mmol/L    Bicarbonate 28 21 - 32 mmol/L    Anion Gap 12 10 - 20 mmol/L    Urea Nitrogen 21 6 - 23 mg/dL    Creatinine 0.70 0.50 - 1.05 mg/dL    eGFR >90 >60 mL/min/1.73m*2    Calcium 9.1 8.6 - 10.3 mg/dL    Albumin 3.7 3.4 - 5.0 g/dL    Alkaline Phosphatase 131 33 - 136 U/L    Total Protein 6.3 (L) 6.4 - 8.2 g/dL    AST 95 (H) 9 - 39 U/L    Bilirubin, Total 0.8 0.0 - 1.2 mg/dL     (H) 7 - 45 U/L   Protime-INR   Result Value Ref Range    Protime 11.9 9.8 - 12.8 seconds    INR 1.1 0.9 - 1.1   Type And  Screen   Result Value Ref Range    ABO TYPE O     Rh TYPE POS     ANTIBODY SCREEN NEG    ECG 12 lead   Result Value Ref Range    Ventricular Rate 78 BPM    Atrial Rate 78 BPM    NY Interval 152 ms    QRS Duration 138 ms    QT Interval 442 ms    QTC Calculation(Bazett) 503 ms    P Axis -23 degrees    R Axis -63 degrees    T Axis 32 degrees    QRS Count 13 beats    Q Onset 207 ms    P Onset 131 ms    P Offset 177 ms    T Offset 428 ms    QTC Fredericia 482 ms   Comprehensive metabolic panel   Result Value Ref Range    Glucose 135 (H) 74 - 99 mg/dL    Sodium 140 136 - 145 mmol/L    Potassium 3.7 3.5 - 5.3 mmol/L    Chloride 105 98 - 107 mmol/L    Bicarbonate 29 21 - 32 mmol/L    Anion Gap 10 10 - 20 mmol/L    Urea Nitrogen 18 6 - 23 mg/dL    Creatinine 0.63 0.50 - 1.05 mg/dL    eGFR >90 >60 mL/min/1.73m*2    Calcium 8.4 (L) 8.6 - 10.3 mg/dL    Albumin 3.2 (L) 3.4 - 5.0 g/dL    Alkaline Phosphatase 163 (H) 33 - 136 U/L    Total Protein 5.6 (L) 6.4 - 8.2 g/dL     (H) 9 - 39 U/L    Bilirubin, Total 1.5 (H) 0.0 - 1.2 mg/dL     (H) 7 - 45 U/L   CBC and Auto Differential   Result Value Ref Range    WBC 2.2 (L) 4.4 - 11.3 x10*3/uL    nRBC 0.0 0.0 - 0.0 /100 WBCs    RBC 4.16 4.00 - 5.20 x10*6/uL    Hemoglobin 12.1 12.0 - 16.0 g/dL    Hematocrit 38.0 36.0 - 46.0 %    MCV 91 80 - 100 fL    MCH 29.1 26.0 - 34.0 pg    MCHC 31.8 (L) 32.0 - 36.0 g/dL    RDW 13.4 11.5 - 14.5 %    Platelets 146 (L) 150 - 450 x10*3/uL    Neutrophils % 46.5 40.0 - 80.0 %    Immature Granulocytes %, Automated 0.5 0.0 - 0.9 %    Lymphocytes % 39.3 13.0 - 44.0 %    Monocytes % 9.1 2.0 - 10.0 %    Eosinophils % 3.7 0.0 - 6.0 %    Basophils % 0.9 0.0 - 2.0 %    Neutrophils Absolute 1.02 (L) 1.20 - 7.70 x10*3/uL    Immature Granulocytes Absolute, Automated 0.01 0.00 - 0.70 x10*3/uL    Lymphocytes Absolute 0.86 (L) 1.20 - 4.80 x10*3/uL    Monocytes Absolute 0.20 0.10 - 1.00 x10*3/uL    Eosinophils Absolute 0.08 0.00 - 0.70 x10*3/uL    Basophils  Absolute 0.02 0.00 - 0.10 x10*3/uL   Magnesium   Result Value Ref Range    Magnesium 1.80 1.60 - 2.40 mg/dL   Bilirubin, Direct   Result Value Ref Range    Bilirubin, Direct 0.7 (H) 0.0 - 0.3 mg/dL        Imaging:  ECG 12 lead  Sinus rhythm with Premature atrial complexes  Left axis deviation  Left ventricular hypertrophy with QRS widening  Abnormal ECG  When compared with ECG of 09-DEC-2024 20:47, (unconfirmed)  Premature atrial complexes are now Present

## 2024-12-10 NOTE — PROGRESS NOTES
Physical Therapy    Physical Therapy Evaluation    Patient Name: Antonieta Boswell  MRN: [id number]  Department: [facility]  Room: [address] [place] [address]  Today's Date: 12/10/2024   Time Calculation  Start Time: 0850  Stop Time: 0903  Time Calculation (min): 13 min    Assessment/Plan   PT Assessment  PT Assessment Results: Decreased strength, Decreased range of motion, Decreased endurance, Impaired balance, Decreased mobility, Decreased safety awareness  Rehab Prognosis: Good  Barriers to Discharge: split level home bathroom top/bottom level  Evaluation/Treatment Tolerance: Patient limited by fatigue, Patient limited by pain (Limited by nausea)  End of Session Communication: Care Coordinator  Assessment Comment: Patient will benefti from additional PT to increase strength, mobility and actiivty tolerance.  End of Session Patient Position: Up in chair, Alarm on  IP OR SWING BED PT PLAN  Inpatient or Swing Bed: Inpatient  PT Plan  Treatment/Interventions: Bed mobility, Transfer training, Gait training, Stair training, Therapeutic exercise, Therapeutic activity, Home exercise program  PT Frequency: 4 times per week  PT Discharge Recommendations:  (Moderate intensity at moderate frequency in 24/7 setting)  PT Recommended Transfer Status: Assist x1  PT - OK to Discharge:  (once deemed medically appropriate with continued PT)    Subjective   General Visit Information:  General  Reason for Referral: Impaired mobility  Referred By: PT/OT 12/9/24 Sita Fay CNP/ERINN Cordova  Past Medical History Relevant to Rehab: COPD, HTN, Depression, Cirrhosis, Annmarie, CHULA, + smoker, ETOh, Breast Caner, Bariatric srugery. Periprosthetic fracture L CHULA 10/4/24  Co-Treatment: OT  Co-Treatment Reason: to maximize patient/staff safty  Patient Position Received: Bed, 3 rail up, Alarm on  General Comment: To ED 12/9/24 secondary to left groin pain following fall 3 days ago. CT 12/9/24 Pelvis Fracture left superior/inferior pubic symphysis; CXR 12/9/24  emphysema/COPD; xR 12/9/24 Left hp acute frature left superior/inferior pubic rami  Home Living:  Home Living  Home Living Comments: Per patient report resides with spouse and son in split level home, handrail on steps 1 large entry step without handrail. Walk in shower no seat no grab bar.  Prior Level of Function:  Prior Function Per Pt/Caregiver Report  Prior Function Comments: Per patient report has been receiving HHPT/OT. Independent in mobilty and ADLS (sponge bathes) with cane. Ableto complete IADLs. Managees medications.  Precautions:  Precautions  LE Weight Bearing Status:  (WBAT LLE)  Medical Precautions: Fall precautions             Objective   Pain:  Pain Assessment  0-10 (Numeric) Pain Score: 8  Pain Type: Acute pain  Pain Location: Groin  Pain Orientation: Left  Cognition:  Cognition  Overall Cognitive Status: Within Functional Limits    General Assessments:  General Observation  General Observation: Patient lying in bed. Very nauseated. Agreeable to PT/OT. Patient under care of orthopedics at [facility]. Had periprosthetic fracture 10/24, transferred to [facility]. Followed up with ortho and 11/18/24 and progressed to WBAT. Patient admits to being WBAT 3 weeks prior due to constraints fo her home and lack of eqipment.       Activity Tolerance  Endurance:  (Poor)    Static Sitting Balance  Static Sitting- Good  Dynamic Sitting Balance  Dynamic Sitting-: Fair +    Static Standing Balance  Static Standing-: Fair +  Dynamic Standing Balance  Dynamic Standing- Fair  Functional Assessments:  Bed Mobility  Bed Mobility:  (Supine > sit with HOB 40 degrees+ 1 minimal assist to move LLE over EOB. Very slow transitioning to sit)    Transfers  Transfer:  (Sit > stand with ww + 1 minmal assist poor hand placement. STand > sit vc to each back.)    Ambulation/Gait Training  Ambulation/Gait Training Performed:  (Patient ambulated 3' with ww wBAT LLE + 1 moderate assist, Notes increased pain, difficulty  advancing LLE. VC for proper sequencing with some improvement in mobiltiy)  Extremity/Trunk Assessments:  RUE   RUE : Within Functional Limits  LUE   LUE: Within Functional Limits  RLE   RLE :  (AROM Hip/knee/ankle WFL MMT 4/5 knee/ankle Hip 3+/5 limited by pain)  LLE   LLE :  (AROM Hip/knee/ankle WFL MMT > 3/5 liimted by pain)  Outcome Measures:  [facility] Basic Mobility  Turning from your back to your side while in a flat bed without using bedrails: A little  Moving from lying on your back to sitting on the side of a flat bed without using bedrails: A little  Moving to and from bed to chair (including a wheelchair): A little  Standing up from a chair using your arms (e.g. wheelchair or bedside chair): A lot  To walk in hospital room: A lot  Climbing 3-5 steps with railing: Total  Basic Mobility - Total Score: 14    Encounter Problems       Encounter Problems (Active)       PT Problem       Bed mobility supine <> sit modified independent  (Progressing)       Start:  12/10/24    Expected End:  12/24/24            Transfers sit <> stand with ww modified independent  (Progressing)       Start:  12/10/24    Expected End:  12/24/24            Patient to ambulate with ww 25'  modified independent  (Progressing)       Start:  12/10/24    Expected End:  12/24/24            Patient to negotiate 5 stesp with handrail and cane CGA (Not Progressing)       Start:  12/10/24    Expected End:  12/24/24            Patient independent in HEP  (Not Progressing)       Start:  12/10/24    Expected End:  12/24/24                   Education Documentation  Precautions, taught by Sharda Hagen PT at 12/10/2024 12:40 PM.  Learner: Patient  Readiness: Acceptance  Method: Explanation, Demonstration  Response: Needs Reinforcement    Mobility Training, taught by Sharda Hagen PT at 12/10/2024 12:40 PM.  Learner: Patient  Readiness: Acceptance  Method: Explanation, Demonstration  Response: Needs Reinforcement

## 2024-12-10 NOTE — PROGRESS NOTES
Occupational Therapy    Evaluation/Treatment    Patient Name: Antonieta Boswell  MRN: [id number]  : 1960  Today's Date: 12/10/24  Time Calculation  Start Time: 849  Stop Time: 903  Time Calculation (min): 14 min     601/601-A    Assessment:  OT Assessment: pt presents with impairments in ADLS and decreased safety with functional mobility, will benefit from con't skilled OT to address impairments  Prognosis: Good  Barriers to Discharge: None  End of Session Communication: Care Coordinator  End of Session Patient Position: Up in chair, Alarm on (call light in reach, purewick external catheter in place)  OT Assessment Results: Decreased ADL status, Decreased endurance, Decreased functional mobility  Prognosis: Good  Barriers to Discharge: None    Plan:  Treatment Interventions: ADL retraining, Functional transfer training, Endurance training, Patient/family training  OT Frequency: 2 times per week  OT Discharge Recommendations: Moderate intensity level of continued care  OT Recommended Transfer Status: Minimal assist  OT - OK to Discharge: Yes  Treatment Interventions: ADL retraining, Functional transfer training, Endurance training, Patient/family training  Subjective     Current Problem:  1. Closed fracture of superior ramus of left pubis, initial encounter (Multi)        2. Fall, initial encounter        3. Ambulatory dysfunction                General:   OT Received On: 12/10/24  General  Reason for Referral: ADL impairment  Referred By: PT/OT 24 Sita Fay CNP/ERINN Cordova  Past Medical History Relevant to Rehab: COPD, HTN, Depression, Cirrhosis, Annmarie, CHULA, + smoker, ETOh, Breast Caner, Bariatric srugery. Periprosthetic fracture L CHULA 10/4/24  Co-Treatment: PT  Co-Treatment Reason: to maximize patient/staff safty  Patient Position Received: Bed, 3 rail up, Alarm on (has purewick external catheter)  General Comment: To ED 24 secondary to left groin pain following fall 3 days ago. CT  12/9/24 Pelvis Fracture left superior/inferior pubic symphysis; CXR 12/9/24 emphysema/COPD; xR 12/9/24 Left hp acute frature left superior/inferior pubic rami    Precautions:  LE Weight Bearing Status:  (WBAT LLE)  Medical Precautions: Fall precautions    Vital Signs:  SpO2:  (pt on room air)    Pain:  Pain Assessment  0-10 (Numeric) Pain Score: 8  Pain Type: Acute pain  Pain Location: Groin  Pain Orientation: Left  Objective     Cognition:  Overall Cognitive Status: Within Functional Limits             Home Living:  Home Living Comments: Per patient report resides with spouse and son in split level home, handrail on steps 1 large entry step without handrail. Walk in shower no seat no grab bar.    Prior Function:  Prior Function Comments: Per patient report has been receiving HHPT/OT. Independent in mobilty and ADLS (sponge bathes) with cane. Able to complete IADLs. Manages medications indep.           Activities of Daily Living:   Eating Assistance: Independent  Grooming Assistance: Stand by  Bathing Assistance: Moderate  UE Dressing Assistance: Stand by  LE Dressing Assistance: Maximal  Toileting Assistance with Device: Maximal  Functional Assistance:  (pt ambulated 3' from bed to chair iwth min A using ww, tends to adopt forward flexed posture at ww)                         Activity Tolerance:  Endurance:  (poor)           Bed Mobility/Transfers: Bed Mobility  Bed Mobility:  (Supine > sit with HOB 40 degrees+ 1 minimal assist to move LLE over EOB. Very slow transitioning to sit)  Transfers  Transfer:  (Sit > stand with ww + 1 minmal assist poor hand placement. STand > sit vc to reach back.)                Balance:  Static Sitting: good -  Dynamic Sitting: fair   Static Standing: fair   Dynamic Standing: fair -      Vision:Vision - Basic Assessment  Current Vision: Wears glasses only for reading        Sensation:  Light Touch: No apparent deficits    Strength:  Strength Comments: B UE 4/5 throughotu             Extremities: RUE   RUE : Within Functional Limits and LUE   LUE: Within Functional Limits    Outcome Measures: [facility] Daily Activity  Putting on and taking off regular lower body clothing: A lot  Bathing (including washing, rinsing, drying): A lot  Putting on and taking off regular upper body clothing: A little  Toileting, which includes using toilet, bedpan or urinal: A lot  Taking care of personal grooming such as brushing teeth: A little  Eating Meals: None  Daily Activity - Total Score: 16    Education Documentation  ADL Training, taught by Katie Pyle, OT at 12/10/2024  1:54 PM.  Learner: Patient  Readiness: Acceptance  Method: Explanation  Response: Verbalizes Understanding, Needs Reinforcement                 Goals:  Encounter Problems       Encounter Problems (Active)       OT Goals       Pt will dress LB with modified indep (Progressing)       Start:  12/10/24    Expected End:  12/24/24            Pt will tolerate 10 minutes of functional activity with one rest break (Progressing)       Start:  12/10/24    Expected End:  12/24/24            pt will transfer to bed, chair ,toilet with SBA (Progressing)       Start:  12/10/24    Expected End:  12/24/24

## 2024-12-10 NOTE — PROGRESS NOTES
12/10/24 1226   Discharge Planning   Living Arrangements Spouse/significant other;Children  (lives with  and son)   Assistance Needed yes, pt states she has been using wheelchair and cane at home, family assisting with ADLS and IADLS as needed, multiple falls, spouse got pt a mac potty which is currently on 1st floor in kitchen, no bathroom on 1st floor   Type of Residence Private residence  (split level home, bedroom/bathroom up and bathroom/laundry down)   Number of Stairs Within Residence 12   Do you have animals or pets at home? Yes   Type of Animals or Pets 2 Cats   Home or Post Acute Services Post acute facilities (Rehab/SNF/etc)   Type of Post Acute Facility Services Rehab;Skilled nursing   Expected Discharge Disposition SNF   Does the patient need discharge transport arranged? Yes   RoundTrip coordination needed? Yes   Has discharge transport been arranged? No   Financial Resource Strain   How hard is it for you to pay for the very basics like food, housing, medical care, and heating? Not hard   Housing Stability   In the last 12 months, was there a time when you were not able to pay the mortgage or rent on time? N   At any time in the past 12 months, were you homeless or living in a shelter (including now)? N   Transportation Needs   In the past 12 months, has lack of transportation kept you from medical appointments or from getting medications? no   In the past 12 months, has lack of transportation kept you from meetings, work, or from getting things needed for daily living? No   Patient Choice   Provider Choice list and CMS website (https://medicare.gov/care-compare#search) for post-acute Quality and Resource Measure Data were provided and reviewed with: Patient   Patient / Family choosing to utilize agency / facility established prior to hospitalization No   Stroke Family Assessment   Stroke Family Assessment Needed No   Intensity of Service   Intensity of Service 0-30 min     Pt admitted  after fall with DX closed fx of superior ramus of left pubis which per Ortho is Non surgical and ortho signing off. Pt states has had multiple falls, lives in split level home and is having difficulty ambulating. PT/OT evals currently pending. Pt states discharge preference is SNF and [facility] is [facility] where she was in recent past. ZHANE Doan notified. CT team will continue monitoring case for progression and DC planning.

## 2024-12-10 NOTE — H&P
Medical Group History and Physical    ASSESSMENT & PLAN:     Acute LEFT superior and inferior ramus fx  Mechanical fall on ice  Uncontrolled pain  Gait instability   - Left hip replacement in October - no complications  - Pain control: Toradol, Percocet, morphine  - trend LFT slight elevation , AST 95; no abd pain, no n/v/d  - hx of regular alcohol use - denies recent use   - Consult Ortho - appreciate recs  - PT/OT - placement  - TCC - dc planning - placement, has multiple stairs and is unable to navigate home, difficulty eating and drinking due to over exhaustion and pain.    Carole Buckner, APRN-CNP    HISTORY OF PRESENT ILLNESS:   Chief Complaint: left hip and tailbone pain    History Of Present Illness:    Antonieta Boswell is a 64 y.o. female with a significant past medical history of COPD, HTN, s/p left hip replacement in October, presents to [place] ER with severe left hip pain following mechanical fall on ice approximately 3 days ago.    Patient denies any symptoms leading up to the fall including dizziness, syncope, fevers and chills, nausea vomiting or diarrhea.  Imaging shows fractures of the left superior and inferior pubic symphysis, with stable hardware of left hip. CXR WNL. Lab work showing evidence of elevated ALT//95, no abdominal pain, patient endorses poor appetite d/t fatigue and pain.  She is no longer able to care for herself in the home, will have PT OT eval and TCC consult for assistance with rehab placement and discharge needs.    VSS ready for admission under general medicine for acute left superior and inferior ramus fracture s/p left hip replacement, pain control and rehab placement    Review of systems: 10 point review of systems is otherwise negative except as mentioned above.    PAST HISTORIES:       Past Medical History:  Medical Problems       Problem List       * (Principal) Closed fracture of superior ramus of left pubis, initial encounter (Multi)    COPD (chronic  "obstructive pulmonary disease) (Multi)    Essential hypertension    History of alcoholism (Multi)    History of gastric restrictive surgery    Hyperlipidemia, unspecified    Malignant neoplasm of upper-outer quadrant of left female breast    Urinary incontinence    Methamphetamine use (Multi)    Depression, recurrent (CMS-HCC)    Closed fracture of left hip    Subendocardial ischemia (Multi)           Past Surgical History:  Past Surgical History:   Procedure Laterality Date   • BARIATRIC SURGERY  2000   • BREAST LUMPECTOMY     • CHOLECYSTECTOMY  2010   • TONSILLECTOMY  1964    with adenoidectomy   • TOTAL HIP ARTHROPLASTY Left 2009          Social History:  She reports that she has been smoking cigarettes. She has never used smokeless tobacco. She reports that she does not currently use alcohol. She reports current drug use. Drugs: Marijuana and Methamphetamines.    Family History:  Family History   Problem Relation Name Age of Onset   • Cancer Mother          uterine   • Cancer Father          TESTICAL   • Diabetes type II Father     • Diabetes Father     • Heart disease Father          Allergies:  Nsaids (non-steroidal anti-inflammatory drug)    OBJECTIVE:       Last Recorded Vitals:  Vitals:    12/09/24 1445   BP: 147/79   BP Location: Right arm   Patient Position: Sitting   Pulse: 89   Resp: 16   Temp: 36.3 °C (97.3 °F)   TempSrc: Temporal   SpO2: 97%   Weight: 59.9 kg (132 lb)   Height: 1.473 m (4' 10\")       Last I/O:  No intake/output data recorded.    Physical Exam  Vitals and nursing note reviewed.   Constitutional:       Appearance: Normal appearance. She is normal weight.   HENT:      Head: Normocephalic and atraumatic.      Nose: Nose normal.      Mouth/Throat:      Mouth: Mucous membranes are moist.      Pharynx: Oropharynx is clear.   Eyes:      Extraocular Movements: Extraocular movements intact.      Conjunctiva/sclera: Conjunctivae normal.      Pupils: Pupils are equal, round, and reactive to " light.   Cardiovascular:      Rate and Rhythm: Normal rate and regular rhythm.      Pulses: Normal pulses.      Heart sounds: Normal heart sounds.   Pulmonary:      Effort: Pulmonary effort is normal.      Breath sounds: Normal breath sounds.      Comments: RA tolerating well    Abdominal:      General: Abdomen is flat. Bowel sounds are normal.      Palpations: Abdomen is soft.      Comments: No pain,    Musculoskeletal:         General: Signs of injury present.      Cervical back: Normal range of motion and neck supple.   Skin:     General: Skin is warm and dry.      Capillary Refill: Capillary refill takes less than 2 seconds.   Neurological:      General: No focal deficit present.      Mental Status: She is alert and oriented to person, place, and time. Mental status is at baseline.      Motor: Weakness present.      Gait: Gait abnormal.   Psychiatric:         Mood and Affect: Mood normal.         Behavior: Behavior normal.         Thought Content: Thought content normal.         Judgment: Judgment normal.           Scheduled Medications    PRN Medications    Continuous Medications      Outpatient Medications:  Prior to Admission medications    Medication Sig Start Date End Date Taking? Authorizing Provider   aspirin 81 mg EC tablet Take 1 tablet (81 mg) by mouth once daily.    Historical Provider, MD   gentamicin (Garamycin) 0.1 % ointment  11/18/24   Historical Provider, MD   traMADol (Ultram) 50 mg tablet Take 1 tablet (50 mg) by mouth every 6 hours if needed for severe pain (7 - 10).    Historical Provider, MD       LABS AND IMAGING:     Labs:  No results found for this or any previous visit (from the past 24 hours).     Imaging:  XR chest 1 view   Final Result   1.  Emphysema/COPD with atelectasis/scarring.                  MACRO:   None        Signed by: Kelley Cope 12/9/2024 7:15 PM   Dictation workstation:   [id number]      CT pelvis wo IV contrast   Final Result   1.  Fractures of the left superior  and inferior pubic symphysis as   described.   2. Left hip prostheses.   3. Partially limited evaluation.        MACRO:   None        Signed by: Kelley Cope 12/9/2024 7:28 PM   Dictation workstation:   [id number]      XR hip left with pelvis when performed 2 or 3 views   Final Result   Acute fracture of the left superior and possibly inferior, pubic   ramus suspected. CT of the pelvis is recommended for further   evaluation.             MACRO:   None.        Signed by: Rigoberto Chilel 12/9/2024 5:09 PM   Dictation workstation:   [id number]

## 2024-12-10 NOTE — DISCHARGE INSTRUCTIONS
Weight bearing as tolerated to bilateral lower extremities  Call to schedule a follow up appointment with Dr. Blas Cordova in 4-6 weeks for repeat xrays

## 2024-12-10 NOTE — CONSULTS
History: Emani is a pleasant 64-year-old female who fell backwards.  She landed awkwardly on her back and buttock region.  She was noted to have increased pain in the left hip and groin.  Imaging showed superior and inferior pubic rami fractures.  She is now being mated for potential placement as she could not ambulate.    Past medical history: Multiple  Medications: Multiple  Allergies: No known drug allergies    Please refer to the intake H&P regarding the patient's review of systems, family history and social history as was done today    HEENT: Normal  Lungs: Clear to auscultation  Heart: RRR  Abdomen: Soft, nontender  Skin: clear  Extremity: On exam she is able to flex and extend the knee with some mild hip and buttock pain.  She has good motion of the foot.  5 out of 5 strength distally.  No numbness or tingling.  There is pain with active hip flexion and extension.  Contralateral exam is normal for strength, motion, stability and neurovascular assessment.    Radiographs: Imaging of the pelvis was reviewed showing superior and inferior pubic rami fractures on the left.  No evidence of hip or periprosthetic fracture.    Assessment: Superior-inferior pubic rami fractures    Plan: At this point her fractures are stable.  She can weight-bear as tolerated with assistance.  She may benefit from rehabilitation stay given her difficulty ambulating.  If discharged she can follow-up with the orthopedic team over the next 4 to 6 weeks for recheck with AP pelvis x-ray.  We thank you for this consultation.

## 2024-12-10 NOTE — CONSULTS
"Reason For Consult  Elevated LFTs    This note was created using voice recognition transcription software. Despite proofreading, unintentional typographical errors may be present. Please contact the GI office with any questions or concerns.       This is a 63 yo Female with a PMH of  COPD, HTN, s/p sleeve gastrectomy (~2011 @ [facility]), gastro-gastric fistula (dx 7/30/15), dysphagia s/p PEG, gastric ulcer, esophagitis, cirrhosis, varices, Hepatitis C per patient, and L THR (20/24) who presented to [facility] on 12/9/24 with reports of falling on ice and L hip pain.  GI was consulted for elevated LFTs.  She was following with a GI provider @ [facility] until 2015.        Subjective  Patient's pelvis hurts.  Difficult to obtain a history from her.  States she had Hep C years ago and was told she couldn't give plasma any longer etc.  Has terrible heartburn and takes \"a lot\" to control it.  No bleeding or abdominal pain.  Denies any other viruses or new medications.  Denies prior IV drug use.  Doesn't follow with a GI doc any longer.         EGD-Patient states she had one somewhere a few years ago but cannot recall.  11/9/15 @ [facility] PEG due to inability to eat.  8/8/15 @ [facility] for dysphagia showing  a 2 cm HH, disrupted staple line that was dilated, evidence of gastric bypass with medium sized pouch. 8/11/14 @ [facility] showing Grade I varices, residual food in stomach, ?gastric dysmotility.5/18/14 showing Grade C erosive esophagitis, narrowing of gastric lumen and a marginal ulcer with no bleeding.  Colonoscopy-9/26/11 @ [facility] showing multiple sessile colorectal polyps and diverticulitis (likely diverticulosis? Erro in report)   BM-Daily.  Normal consistency.  No bleeding or weight loss.  FHX-Mom had uterine CA  SHX-No tobacco/illicits/ETOH.  Former EtOH abuse and quit about 9 years ago per her.  Ab Sx-Cholecystectomy  NSAIDs- Denies         Allergies: as mentioned in H&P      A 10 point review of system is negative except for what is " "mentioned in the HPI    Vital Signs: Reviewed    Physical Exam:  General: Sleepy, anxious  Skin:  Warm and dry, no jaundice  HEENT: No scleral icterus, no conjunctival pallor, normocephalic, atraumatic, mucous membranes moist  Neck:  atraumatic, trachea midline, no JVD  Chest:  Clear to auscultation bilaterally. No wheezes, rales, or rhonchi  CV:  Regular rate and rhythm.  Positive S1/S2  Abdomen: no distension, +BS, soft, non-tender to palpation, no rebound tenderness, no guarding, no rigidity, no discernible ascites   Extremities: no lower extremity edema, chronic pigmentary changes, no cyanosis  Neurological:  A&Ox3, no asterixis  Psychiatric: cooperative     Investigations:  Labs, radiological imaging and cardiac work up were reviewed      Objective:         8/21/2024     3:21 PM 10/31/2024     2:41 PM 11/19/2024    12:32 PM 11/19/2024    12:38 PM 12/9/2024     2:45 PM 12/9/2024    10:39 PM 12/10/2024     7:24 AM   Vitals   Systolic 170 160 150 148 147 176 181   Diastolic 100 82 76 76 79 78 84   BP Location  Left arm   Right arm Left arm    Heart Rate 96 80 98  89 80 68   Temp 36.7 °C (98 °F)  36.3 °C (97.3 °F)  36.3 °C (97.3 °F)  36.3 °C (97.3 °F)   Resp 18  16  16 18 18   Height  1.448 m (4' 9\") 1.448 m (4' 9\")  1.473 m (4' 10\")     Weight (lb) 128 133 --  132     BMI 26.76 kg/m2 28.78 kg/m2 28.78 kg/m2  27.59 kg/m2     BSA (m2) 1.54 m2 1.56 m2 1.56 m2  1.57 m2     Visit Report Report Report Report Report             Medications:  docusate sodium, 100 mg, oral, BID  enoxaparin, 40 mg, subcutaneous, q24h  hydroCHLOROthiazide, 12.5 mg, oral, Daily         Recent Results (from the past 72 hours)   CBC and Auto Differential    Collection Time: 12/09/24  8:42 PM   Result Value Ref Range    WBC 3.6 (L) 4.4 - 11.3 x10*3/uL    nRBC 0.0 0.0 - 0.0 /100 WBCs    RBC 4.11 4.00 - 5.20 x10*6/uL    Hemoglobin 12.3 12.0 - 16.0 g/dL    Hematocrit 37.3 36.0 - 46.0 %    MCV 91 80 - 100 fL    MCH 29.9 26.0 - 34.0 pg    MCHC 33.0 " 32.0 - 36.0 g/dL    RDW 13.4 11.5 - 14.5 %    Platelets 132 (L) 150 - 450 x10*3/uL    Neutrophils % 56.9 40.0 - 80.0 %    Immature Granulocytes %, Automated 0.0 0.0 - 0.9 %    Lymphocytes % 30.1 13.0 - 44.0 %    Monocytes % 9.6 2.0 - 10.0 %    Eosinophils % 3.1 0.0 - 6.0 %    Basophils % 0.3 0.0 - 2.0 %    Neutrophils Absolute 2.03 1.20 - 7.70 x10*3/uL    Immature Granulocytes Absolute, Automated 0.00 0.00 - 0.70 x10*3/uL    Lymphocytes Absolute 1.07 (L) 1.20 - 4.80 x10*3/uL    Monocytes Absolute 0.34 0.10 - 1.00 x10*3/uL    Eosinophils Absolute 0.11 0.00 - 0.70 x10*3/uL    Basophils Absolute 0.01 0.00 - 0.10 x10*3/uL   Comprehensive metabolic panel    Collection Time: 12/09/24  8:42 PM   Result Value Ref Range    Glucose 98 74 - 99 mg/dL    Sodium 140 136 - 145 mmol/L    Potassium 3.7 3.5 - 5.3 mmol/L    Chloride 104 98 - 107 mmol/L    Bicarbonate 28 21 - 32 mmol/L    Anion Gap 12 10 - 20 mmol/L    Urea Nitrogen 21 6 - 23 mg/dL    Creatinine 0.70 0.50 - 1.05 mg/dL    eGFR >90 >60 mL/min/1.73m*2    Calcium 9.1 8.6 - 10.3 mg/dL    Albumin 3.7 3.4 - 5.0 g/dL    Alkaline Phosphatase 131 33 - 136 U/L    Total Protein 6.3 (L) 6.4 - 8.2 g/dL    AST 95 (H) 9 - 39 U/L    Bilirubin, Total 0.8 0.0 - 1.2 mg/dL     (H) 7 - 45 U/L   Protime-INR    Collection Time: 12/09/24  8:42 PM   Result Value Ref Range    Protime 11.9 9.8 - 12.8 seconds    INR 1.1 0.9 - 1.1   Type And Screen    Collection Time: 12/09/24  8:42 PM   Result Value Ref Range    ABO TYPE O     Rh TYPE POS     ANTIBODY SCREEN NEG    ECG 12 lead    Collection Time: 12/09/24  8:51 PM   Result Value Ref Range    Ventricular Rate 78 BPM    Atrial Rate 78 BPM    TX Interval 152 ms    QRS Duration 138 ms    QT Interval 442 ms    QTC Calculation(Bazett) 503 ms    P Axis -23 degrees    R Axis -63 degrees    T Axis 32 degrees    QRS Count 13 beats    Q Onset 207 ms    P Onset 131 ms    P Offset 177 ms    T Offset 428 ms    QTC Fredericia 482 ms   Comprehensive  metabolic panel    Collection Time: 12/10/24  6:03 AM   Result Value Ref Range    Glucose 135 (H) 74 - 99 mg/dL    Sodium 140 136 - 145 mmol/L    Potassium 3.7 3.5 - 5.3 mmol/L    Chloride 105 98 - 107 mmol/L    Bicarbonate 29 21 - 32 mmol/L    Anion Gap 10 10 - 20 mmol/L    Urea Nitrogen 18 6 - 23 mg/dL    Creatinine 0.63 0.50 - 1.05 mg/dL    eGFR >90 >60 mL/min/1.73m*2    Calcium 8.4 (L) 8.6 - 10.3 mg/dL    Albumin 3.2 (L) 3.4 - 5.0 g/dL    Alkaline Phosphatase 163 (H) 33 - 136 U/L    Total Protein 5.6 (L) 6.4 - 8.2 g/dL     (H) 9 - 39 U/L    Bilirubin, Total 1.5 (H) 0.0 - 1.2 mg/dL     (H) 7 - 45 U/L   CBC and Auto Differential    Collection Time: 12/10/24  6:03 AM   Result Value Ref Range    WBC 2.2 (L) 4.4 - 11.3 x10*3/uL    nRBC 0.0 0.0 - 0.0 /100 WBCs    RBC 4.16 4.00 - 5.20 x10*6/uL    Hemoglobin 12.1 12.0 - 16.0 g/dL    Hematocrit 38.0 36.0 - 46.0 %    MCV 91 80 - 100 fL    MCH 29.1 26.0 - 34.0 pg    MCHC 31.8 (L) 32.0 - 36.0 g/dL    RDW 13.4 11.5 - 14.5 %    Platelets 146 (L) 150 - 450 x10*3/uL    Neutrophils % 46.5 40.0 - 80.0 %    Immature Granulocytes %, Automated 0.5 0.0 - 0.9 %    Lymphocytes % 39.3 13.0 - 44.0 %    Monocytes % 9.1 2.0 - 10.0 %    Eosinophils % 3.7 0.0 - 6.0 %    Basophils % 0.9 0.0 - 2.0 %    Neutrophils Absolute 1.02 (L) 1.20 - 7.70 x10*3/uL    Immature Granulocytes Absolute, Automated 0.01 0.00 - 0.70 x10*3/uL    Lymphocytes Absolute 0.86 (L) 1.20 - 4.80 x10*3/uL    Monocytes Absolute 0.20 0.10 - 1.00 x10*3/uL    Eosinophils Absolute 0.08 0.00 - 0.70 x10*3/uL    Basophils Absolute 0.02 0.00 - 0.10 x10*3/uL   Magnesium    Collection Time: 12/10/24  6:03 AM   Result Value Ref Range    Magnesium 1.80 1.60 - 2.40 mg/dL   Bilirubin, Direct    Collection Time: 12/10/24  6:03 AM   Result Value Ref Range    Bilirubin, Direct 0.7 (H) 0.0 - 0.3 mg/dL          Assessment:  This is a 63 yo Female with a PMH of  COPD, HTN, s/p sleeve gastrectomy (~2011 @ [facility]), gastro-gastric  fistula (dx 7/30/15), dysphagia s/p PEG, gastric ulcer, esophagitis, cirrhosis, varices, Hepatitis C per patient, and L THR (20/24) who presented to [facility] on 12/9/24 with reports of falling on ice and L hip pain.  GI was consulted for elevated LFTs.  She was following with a GI provider @ [facility] until 2015.  She reports remote history of Hep C and isn't sure how she got it.  No new meds or illnesses.  States she had an EGD a few years ago but cannot recall when etc and I was unable to locate that information in the system; otherwise last documented one in the system was in 11/2015 and that's when she got a PEG d/t severe weight loss d/t inability to eat from severe n/v.  States her cirrhosis has resolved.  CT pelvis showed fractures of L superior and inferior pubic symphysis.  CT A/P from 10/4/24 showed an unremarkable liver.  LFT's trending up Alk phos 163, ALT//284.  T bili 1.5 and direct is 0.7.  INR 1.1.  . Unclear etiology of elevated LFT's at this time.        Plan  1.)  Elevated LFTs-Trend LFTs, avoid hepatotoxic medications, RUQ US pending, and acute viral hep panel pending.  Will add on a Hep C RNA to confirm eradication.  She will need outpatient follow-up.  I will have my office contact her to schedule this.    2.)  Diverticulosis-Meet with dietician as outpatient to discuss fiber intake.  Avoid NSAIDs/constipation.      Discussed patient with Dr. Lockett.    I spent 70 minutes in the professional and overall care of this patient.      12/10/24 at 10:05 AM - ANUPAM Gay-CNP

## 2024-12-11 VITALS
DIASTOLIC BLOOD PRESSURE: 65 MMHG | WEIGHT: 132 LBS | BODY MASS INDEX: 27.71 KG/M2 | HEIGHT: 58 IN | SYSTOLIC BLOOD PRESSURE: 135 MMHG | RESPIRATION RATE: 16 BRPM | HEART RATE: 67 BPM | OXYGEN SATURATION: 94 % | TEMPERATURE: 97.7 F

## 2024-12-11 DIAGNOSIS — S32.512A CLOSED FRACTURE OF SUPERIOR RAMUS OF LEFT PUBIS, INITIAL ENCOUNTER (MULTI): Primary | ICD-10-CM

## 2024-12-11 LAB
ALBUMIN SERPL BCP-MCNC: 3.1 G/DL (ref 3.4–5)
ALP SERPL-CCNC: 171 U/L (ref 33–136)
ALT SERPL W P-5'-P-CCNC: 149 U/L (ref 7–45)
ANION GAP SERPL CALC-SCNC: 9 MMOL/L (ref 10–20)
APAP SERPL-MCNC: <10 UG/ML
AST SERPL W P-5'-P-CCNC: 120 U/L (ref 9–39)
BASOPHILS # BLD AUTO: 0.03 X10*3/UL (ref 0–0.1)
BASOPHILS NFR BLD AUTO: 0.9 %
BILIRUB SERPL-MCNC: 0.7 MG/DL (ref 0–1.2)
BUN SERPL-MCNC: 20 MG/DL (ref 6–23)
CALCIUM SERPL-MCNC: 8.4 MG/DL (ref 8.6–10.3)
CHLORIDE SERPL-SCNC: 104 MMOL/L (ref 98–107)
CO2 SERPL-SCNC: 30 MMOL/L (ref 21–32)
CREAT SERPL-MCNC: 0.74 MG/DL (ref 0.5–1.05)
EBV EA IGG SER QL: POSITIVE
EBV NA AB SER QL: POSITIVE
EBV VCA IGG SER IA-ACNC: POSITIVE
EBV VCA IGM SER IA-ACNC: NEGATIVE
EGFRCR SERPLBLD CKD-EPI 2021: 90 ML/MIN/1.73M*2
EOSINOPHIL # BLD AUTO: 0.12 X10*3/UL (ref 0–0.7)
EOSINOPHIL NFR BLD AUTO: 3.7 %
ERYTHROCYTE [DISTWIDTH] IN BLOOD BY AUTOMATED COUNT: 13.5 % (ref 11.5–14.5)
ETHANOL SERPL-MCNC: <10 MG/DL
GLUCOSE SERPL-MCNC: 83 MG/DL (ref 74–99)
HCT VFR BLD AUTO: 37.7 % (ref 36–46)
HGB BLD-MCNC: 12.1 G/DL (ref 12–16)
IMM GRANULOCYTES # BLD AUTO: 0.01 X10*3/UL (ref 0–0.7)
IMM GRANULOCYTES NFR BLD AUTO: 0.3 % (ref 0–0.9)
LYMPHOCYTES # BLD AUTO: 1.08 X10*3/UL (ref 1.2–4.8)
LYMPHOCYTES NFR BLD AUTO: 33.4 %
MAGNESIUM SERPL-MCNC: 1.82 MG/DL (ref 1.6–2.4)
MCH RBC QN AUTO: 29.6 PG (ref 26–34)
MCHC RBC AUTO-ENTMCNC: 32.1 G/DL (ref 32–36)
MCV RBC AUTO: 92 FL (ref 80–100)
MONOCYTES # BLD AUTO: 0.31 X10*3/UL (ref 0.1–1)
MONOCYTES NFR BLD AUTO: 9.6 %
NEUTROPHILS # BLD AUTO: 1.68 X10*3/UL (ref 1.2–7.7)
NEUTROPHILS NFR BLD AUTO: 52.1 %
NRBC BLD-RTO: 0 /100 WBCS (ref 0–0)
PLATELET # BLD AUTO: 179 X10*3/UL (ref 150–450)
POTASSIUM SERPL-SCNC: 4.2 MMOL/L (ref 3.5–5.3)
PROT SERPL-MCNC: 5.4 G/DL (ref 6.4–8.2)
RBC # BLD AUTO: 4.09 X10*6/UL (ref 4–5.2)
SALICYLATES SERPL-MCNC: <3 MG/DL
SODIUM SERPL-SCNC: 139 MMOL/L (ref 136–145)
WBC # BLD AUTO: 3.2 X10*3/UL (ref 4.4–11.3)

## 2024-12-11 PROCEDURE — 99232 SBSQ HOSP IP/OBS MODERATE 35: CPT | Performed by: NURSE PRACTITIONER

## 2024-12-11 PROCEDURE — 2500000001 HC RX 250 WO HCPCS SELF ADMINISTERED DRUGS (ALT 637 FOR MEDICARE OP): Performed by: INTERNAL MEDICINE

## 2024-12-11 PROCEDURE — 80320 DRUG SCREEN QUANTALCOHOLS: CPT | Performed by: NURSE PRACTITIONER

## 2024-12-11 PROCEDURE — 99239 HOSP IP/OBS DSCHRG MGMT >30: CPT | Performed by: INTERNAL MEDICINE

## 2024-12-11 PROCEDURE — 2500000004 HC RX 250 GENERAL PHARMACY W/ HCPCS (ALT 636 FOR OP/ED): Performed by: NURSE PRACTITIONER

## 2024-12-11 PROCEDURE — 85025 COMPLETE CBC W/AUTO DIFF WBC: CPT | Performed by: NURSE PRACTITIONER

## 2024-12-11 PROCEDURE — 36415 COLL VENOUS BLD VENIPUNCTURE: CPT | Performed by: NURSE PRACTITIONER

## 2024-12-11 PROCEDURE — 87522 HEPATITIS C REVRS TRNSCRPJ: CPT | Mod: ELYLAB | Performed by: INTERNAL MEDICINE

## 2024-12-11 PROCEDURE — 2500000001 HC RX 250 WO HCPCS SELF ADMINISTERED DRUGS (ALT 637 FOR MEDICARE OP): Performed by: NURSE PRACTITIONER

## 2024-12-11 PROCEDURE — 80053 COMPREHEN METABOLIC PANEL: CPT | Performed by: NURSE PRACTITIONER

## 2024-12-11 PROCEDURE — 83735 ASSAY OF MAGNESIUM: CPT | Performed by: NURSE PRACTITIONER

## 2024-12-11 RX ORDER — HYDROCHLOROTHIAZIDE 12.5 MG/1
12.5 CAPSULE ORAL DAILY
Start: 2024-12-11

## 2024-12-11 RX ORDER — OXYCODONE HYDROCHLORIDE 5 MG/1
5 TABLET ORAL EVERY 6 HOURS PRN
Qty: 15 TABLET | Refills: 0 | Status: SHIPPED | OUTPATIENT
Start: 2024-12-11

## 2024-12-11 RX ORDER — OXYCODONE HYDROCHLORIDE 5 MG/1
5 TABLET ORAL EVERY 6 HOURS PRN
Status: CANCELLED | OUTPATIENT
Start: 2024-12-11

## 2024-12-11 RX ORDER — CYCLOBENZAPRINE HCL 5 MG
5 TABLET ORAL 3 TIMES DAILY PRN
Start: 2024-12-11

## 2024-12-11 ASSESSMENT — PAIN SCALES - WONG BAKER: WONGBAKER_NUMERICALRESPONSE: HURTS LITTLE BIT

## 2024-12-11 ASSESSMENT — COGNITIVE AND FUNCTIONAL STATUS - GENERAL
DRESSING REGULAR LOWER BODY CLOTHING: A LITTLE
MOBILITY SCORE: 16
MOVING FROM LYING ON BACK TO SITTING ON SIDE OF FLAT BED WITH BEDRAILS: A LITTLE
TURNING FROM BACK TO SIDE WHILE IN FLAT BAD: A LITTLE
WALKING IN HOSPITAL ROOM: A LOT
CLIMB 3 TO 5 STEPS WITH RAILING: A LOT
STANDING UP FROM CHAIR USING ARMS: A LITTLE
DAILY ACTIVITIY SCORE: 20
MOVING TO AND FROM BED TO CHAIR: A LITTLE
HELP NEEDED FOR BATHING: A LITTLE
TOILETING: A LOT

## 2024-12-11 ASSESSMENT — PAIN SCALES - GENERAL
PAINLEVEL_OUTOF10: 8
PAINLEVEL_OUTOF10: 7

## 2024-12-11 ASSESSMENT — PAIN - FUNCTIONAL ASSESSMENT
PAIN_FUNCTIONAL_ASSESSMENT: 0-10
PAIN_FUNCTIONAL_ASSESSMENT: 0-10
PAIN_FUNCTIONAL_ASSESSMENT: WONG-BAKER FACES

## 2024-12-11 NOTE — CARE PLAN
Problem: Chronic Conditions and Co-morbidities  Goal: Patient's chronic conditions and co-morbidity symptoms are monitored and maintained or improved  12/11/2024 1144 by Micah Crystal RN  Outcome: Adequate for Discharge  12/11/2024 0909 by Micah Crystal RN  Outcome: Progressing     Problem: Pain  Goal: Takes deep breaths with improved pain control throughout the shift  12/11/2024 1144 by Micah Crystal RN  Outcome: Adequate for Discharge  12/11/2024 0909 by Micah Crystal RN  Outcome: Progressing  Goal: Turns in bed with improved pain control throughout the shift  12/11/2024 1144 by Micah Crystal RN  Outcome: Adequate for Discharge  12/11/2024 0909 by Micah Crystal RN  Outcome: Progressing  Goal: Walks with improved pain control throughout the shift  12/11/2024 1144 by Micah Crystal RN  Outcome: Adequate for Discharge  12/11/2024 0909 by Micah Crystal RN  Outcome: Progressing  Goal: Performs ADL's with improved pain control throughout shift  12/11/2024 1144 by Micah Crystal RN  Outcome: Adequate for Discharge  12/11/2024 0909 by Micah Crystal RN  Outcome: Progressing  Goal: Participates in PT with improved pain control throughout the shift  12/11/2024 1144 by Micah Crystal RN  Outcome: Adequate for Discharge  12/11/2024 0909 by Micah Crystal RN  Outcome: Progressing  Goal: Free from opioid side effects throughout the shift  12/11/2024 1144 by Micah Crystal RN  Outcome: Adequate for Discharge  12/11/2024 0909 by Micah Crystal RN  Outcome: Progressing  Goal: Free from acute confusion related to pain meds throughout the shift  12/11/2024 1144 by Micah Crystal RN  Outcome: Adequate for Discharge  12/11/2024 0909 by Micah Crystal RN  Outcome: Progressing   The patient's goals for the shift include rest and comfort    The clinical goals for the shift include Pain management

## 2024-12-11 NOTE — PROGRESS NOTES
Physical Therapy                 Therapy Communication Note    Patient Name: Antonieta Boswell  MRN: [id number]  Department: [facility]  Room: [facility]60A  Today's Date: 12/11/2024     Discipline: Physical Therapy    Missed Visit Reason: Missed Visit Reason: Patient refused (Pt refusing citing increased pain from just using the RR and pt also notifying PTA that she is scheduled to leave for SNF at 1230. Confirmed with [facility])    Missed Time: Attempt @1031    Comment:

## 2024-12-11 NOTE — PROGRESS NOTES
"Antonieta Boswell is a 64 y.o. female on day 2 of admission presenting with Closed fracture of superior ramus of left pubis, initial encounter (Multi).    Subjective   Patient feeling better.  No significant abdominal pain, nausea, vomiting or diarrhea.  No black or bloody stools.  No altered mental status.  No increased abdominal girth or lower leg edema.  Afebrile and hemodynamically stable.    Objective   General: Sleepy, anxious  Skin:  Warm and dry, no jaundice  HEENT: No scleral icterus, no conjunctival pallor, normocephalic, atraumatic, mucous membranes moist  Neck:  atraumatic, trachea midline, no JVD  Chest:  Clear to auscultation bilaterally. No wheezes, rales, or rhonchi  CV:  Regular rate and rhythm.  Positive S1/S2  Abdomen: no distension, +BS, soft, non-tender to palpation, no rebound tenderness, no guarding, no rigidity, no discernible ascites   Extremities: no lower extremity edema, chronic pigmentary changes, no cyanosis  Neurological:  A&Ox3, no asterixis  Psychiatric: cooperative     Last Recorded Vitals  Blood pressure 135/65, pulse 67, temperature 36.5 °C (97.7 °F), resp. rate 16, height 1.473 m (4' 10\"), weight 59.9 kg (132 lb), SpO2 94%.  Intake/Output last 3 Shifts:  I/O last 3 completed shifts:  In: 1200 (20 mL/kg) [P.O.:1200]  Out: 800 (13.4 mL/kg) [Urine:800 (0.4 mL/kg/hr)]  Weight: 59.9 kg     Relevant Results  US right upper quadrant    Result Date: 12/10/2024  Interpreted By:  Rigoberto Chilel, STUDY: US RIGHT UPPER QUADRANT;  12/10/2024 5:50 pm   INDICATION: Signs/Symptoms:elevated LFTs.     COMPARISON: None.   ACCESSION NUMBER(S): [id number]   ORDERING CLINICIAN: ELIAS BOWDEN   TECHNIQUE: Grayscale and color Doppler sonographic imaging of the right upper quadrant.   FINDINGS: LIVER: The liver is normal in size.. Heterogeneous hepatic parenchyma. No focal abnormalities.   BILE DUCTS: No intrahepatic or extrahepatic bile duct dilatation. Extrahepatic bile duct = 0.5 cm   GALLBLADDER:  " Surgically absent. The sonographic Mahmood sign is negative.   PANCREAS: The visualized portions of the pancreas appear within normal limits.   RIGHT KIDNEY: The right kidney is normal in size. Normal renal cortical thickness and echogenicity. No hydronephrosis. No renal calculus. No perinephric fluid. There is a 4.3 cm simple cyst in the midpole.   PERITONEUM/OTHER: No upper abdominal ascites.       Status post cholecystectomy.   Heterogeneous parenchyma could be related to cirrhosis. No evidence of upper abdominal ascites.     MACRO: None.   Signed by: Rigoberto Chilel 12/10/2024 6:19 PM Dictation workstation:   [id number]      CT pelvis wo IV contrast    Result Date: 12/9/2024  Interpreted By:  Kelley Cope, STUDY: CT PELVIS WO IV CONTRAST;  12/9/2024 6:58 pm   INDICATION: Signs/Symptoms:left superior and possibly inferior, pubic ramus suspected.   COMPARISON: Radiograph 12/09/2024   ACCESSION NUMBER(S): [id number]   ORDERING CLINICIAN: FERNANDO VAZ   TECHNIQUE: CT of the pelvis was performed. Contiguous axial images were obtained through the pelvis. Coronal and sagittal reconstructions were performed.  No intravenous contrast was administered.   FINDINGS: PELVIS:   BONES: Redemonstrated postsurgical changes of a left hip arthroplasty which appears intact. There is a acetabular screw which extends posteriorly through the outer cortex of the pelvis. No periprosthetic lucency or evidence of hardware fracture identified. Left hip prosthetic and right hip alignment are maintained. Streak artifact partially obscures evaluation.   A partially impacted, comminuted acute fracture is again noted through the medial left superior pubic ramus extending into the medial inferior pubic ramus. A mildly comminuted acute fracture of the lateral inferior pubic ramus is also noted.   No additional pelvic acute fractures identified within constraints of demineralization. Degenerative changes are present in the visualized  lower lumbar spine and sacroiliac joints. Mild degenerative changes of the right hip.   ABDOMINAL WALL: Streak artifact partially limits evaluation however there is asymmetric stranding and intramuscular edema along the medial/adductor compartment of the left upper thigh.   Other: Atherosclerotic calcifications of the visualized aorta and iliac arteries noted. Moderate colonic stool burden noted. Suture material along small bowel in the left lower quadrant identified. Diverticulosis noted. Limited evaluation of the bladder due to streak artifact. The uterus appears present. No significant ascites. No fluid collection or free air identified.         1.  Fractures of the left superior and inferior pubic symphysis as described. 2. Left hip prostheses. 3. Partially limited evaluation.   MACRO: None   Signed by: Kelley Cope 12/9/2024 7:28 PM Dictation workstation:   [id number]    Lab Results   Component Value Date    WBC 3.2 (L) 12/11/2024    HGB 12.1 12/11/2024    HCT 37.7 12/11/2024    MCV 92 12/11/2024     12/11/2024     Lab Results   Component Value Date     (H) 12/11/2024     (H) 12/11/2024    ALKPHOS 171 (H) 12/11/2024    BILITOT 0.7 12/11/2024     Lab Results   Component Value Date    GLUCOSE 83 12/11/2024    CALCIUM 8.4 (L) 12/11/2024     12/11/2024    K 4.2 12/11/2024    CO2 30 12/11/2024     12/11/2024    BUN 20 12/11/2024    CREATININE 0.74 12/11/2024     Lab Results   Component Value Date    INR 1.1 12/09/2024    PROTIME 11.9 12/09/2024      UPPER GASTROINTESTINAL ENDOSCOPY 9-26-11  fiorella-polyps   UPPER GASTROINTESTINAL ENDOSCOPY 5/18/14  DR. RAMIREZ     Assessment/Plan   This is a 65 yo Female with a PMH of  COPD, HTN, s/p sleeve gastrectomy (~2011 @ [facility]), gastro-gastric fistula (dx 7/30/15), dysphagia s/p PEG, gastric ulcer, alcohol abuse, esophagitis, cirrhosis with varices, Hepatitis C per patient, and L THR (20/24) who presented to [facility] on 12/9/24 with reports of  falling on ice and L hip pain.  GI was consulted for elevated LFTs.  She was following with a GI provider @ [facility] until 2015.  She reports remote history of Hep C and isn't sure how she got it.  (History of IV drug use and alcohol abuse noted on health record) no new meds or illnesses.  States she had an EGD a few years ago but cannot recall when etc and I was unable to locate that information in the system; otherwise last documented one in the system was in 11/2015 and that's when she got a PEG d/t severe weight loss d/t inability to eat from severe n/v.  States her cirrhosis has resolved.  CT pelvis showed fractures of L superior and inferior pubic symphysis.  CT A/P from 10/4/24 showed an unremarkable liver.  LFT's trending up Alk phos 163, ALT//284.  T bili 1.5 and direct is 0.7.  INR 1.1.  . Unclear etiology of elevated LFT's at this time.      NOTE: patient had hospitalization in June, 2014 alcohol abuse, alcohol cirrhosis of liver with varices and ascites and many other co-morbidities. She was d/c from hospital and admitted to [facility] for rehab.     12/11/24  -AST/ALT, total bilirubin downtrending.  AST 95-->284-->120.  -->185-->149.  Total bilirubin 0.8-->1.5-->0.7.  Alkaline phosphatase 131-->163-->171.  -Acute hepatitis panel negative.  Hepatitis C RNA pending.  -RUQUS no intrahepatic or extrahepatic bile duct dilation.  CBD measures 0.5 cm.  Liver is normal in size.  Cirrhotic morphology.  No ascites.  Gallbladder surgically absent.  Visualized portions of the pancreas normal.  -Platelets normal 179, INR 1.1.  Hemoglobin stable 12.1.  -Albumin 3.1      Elevated LFTs with hepatocellular injury pattern.  Moderate transaminitis, hyperbilirubinemia, and elevated alkaline phosphatase  Alcohol cirrhosis with history of esophageal varices, ascites  Hypoalbuminemia  Diverticulosis     -Trend LFTs, INR   -Avoid hepatotoxic medications  -Check acetaminophen level  -Check HSV,  EBV   -Check Hep C RNA to confirm eradication.    -She will need follow-up with GI outpatient.  I will have my office contact her to schedule this.    -Avoid constipation-MiraLAX 1-2 times daily  -Meet with dietician as outpatient to discuss fiber intake, hypoalbuminemia  -Recommend 1.5 g/kilogram protein daily.  -Low-salt diet  -Avoid NSAIDs  -Abstain from alcohol  -OK to discharge from GI standpoint.    I spent 25 minutes in the professional and overall care of this patient.      Cande Doyle, ANUPAM-CNP

## 2024-12-11 NOTE — DISCHARGE SUMMARY
DISCHARGE DIAGNOSIS     Mechanical fall  L side superior-inferior pubic rami fractures  Elevated LFTs  HTN    HOSPITAL COURSE AND DETAILS       64F with PMH of COPD, HTN, recent L hip replacement who presented with mechanical fall after slipping on ice. Imaging showed fractures of L superior and inferior pubic symphisis. Ortho evaluated, nonoperative mgmt, okay for WBAT with assistance. Fu with ortho as outpt. Wrote short course Rx for PRN oxycodone for pain control.     Had evidence of HTN here, started on hydrochlorothiazide with improvement, cont on discharge. Rpt CMP next week to monitor renal function, electrolytes.     Also noted to have low grade elevated LFTs, primarily hepatocellular pattern with unclear etiology. RUQ US unremarkable. Acute viral hep panel neg. LFTs were improving at discharge, although still elevated. Pt asx. GI saw here, reported hx of HCV, HCV Ab neg, HCV RNA pending at time of dc. Will fu with GI as outpt. Rpt CMP next week at SNF or as outpt. Fu with PCP as well.     Stable for dc to SNF. Total time spent on discharge services 32 minutes.     DISCHARGE PHYSICAL EXAM     Last Recorded Vitals:  Vitals:    12/10/24 1515 12/10/24 2022 12/11/24 0108 12/11/24 0729   BP: (!) 184/72 106/53 139/66 135/65   Patient Position:    Sitting   Pulse: 86 81 79 67   Resp:  16 16 16   Temp: 36.1 °C (97 °F) 36.5 °C (97.7 °F) 37 °C (98.6 °F) 36.5 °C (97.7 °F)   TempSrc:   Temporal    SpO2: 96% 93% 94% 94%   Weight:       Height:           Physical Exam:  GEN: appears stated age, NAD  CV: RRR, no m/r/g, no LE edema  LUNGS: CTAB, no w/r/c  ABD: soft, NT, ND, NBS  SKIN: no rashes  NEURO: A+Ox3  PSYCH: appropriate mood, affect      PERTINENT LABS AND IMAGING     Results for orders placed or performed during the hospital encounter of 12/09/24 (from the past 96 hours)   CBC and Auto Differential   Result Value Ref Range    WBC 3.6 (L) 4.4 - 11.3 x10*3/uL    nRBC 0.0 0.0 - 0.0 /100 WBCs    RBC 4.11 4.00 - 5.20  x10*6/uL    Hemoglobin 12.3 12.0 - 16.0 g/dL    Hematocrit 37.3 36.0 - 46.0 %    MCV 91 80 - 100 fL    MCH 29.9 26.0 - 34.0 pg    MCHC 33.0 32.0 - 36.0 g/dL    RDW 13.4 11.5 - 14.5 %    Platelets 132 (L) 150 - 450 x10*3/uL    Neutrophils % 56.9 40.0 - 80.0 %    Immature Granulocytes %, Automated 0.0 0.0 - 0.9 %    Lymphocytes % 30.1 13.0 - 44.0 %    Monocytes % 9.6 2.0 - 10.0 %    Eosinophils % 3.1 0.0 - 6.0 %    Basophils % 0.3 0.0 - 2.0 %    Neutrophils Absolute 2.03 1.20 - 7.70 x10*3/uL    Immature Granulocytes Absolute, Automated 0.00 0.00 - 0.70 x10*3/uL    Lymphocytes Absolute 1.07 (L) 1.20 - 4.80 x10*3/uL    Monocytes Absolute 0.34 0.10 - 1.00 x10*3/uL    Eosinophils Absolute 0.11 0.00 - 0.70 x10*3/uL    Basophils Absolute 0.01 0.00 - 0.10 x10*3/uL   Comprehensive metabolic panel   Result Value Ref Range    Glucose 98 74 - 99 mg/dL    Sodium 140 136 - 145 mmol/L    Potassium 3.7 3.5 - 5.3 mmol/L    Chloride 104 98 - 107 mmol/L    Bicarbonate 28 21 - 32 mmol/L    Anion Gap 12 10 - 20 mmol/L    Urea Nitrogen 21 6 - 23 mg/dL    Creatinine 0.70 0.50 - 1.05 mg/dL    eGFR >90 >60 mL/min/1.73m*2    Calcium 9.1 8.6 - 10.3 mg/dL    Albumin 3.7 3.4 - 5.0 g/dL    Alkaline Phosphatase 131 33 - 136 U/L    Total Protein 6.3 (L) 6.4 - 8.2 g/dL    AST 95 (H) 9 - 39 U/L    Bilirubin, Total 0.8 0.0 - 1.2 mg/dL     (H) 7 - 45 U/L   Protime-INR   Result Value Ref Range    Protime 11.9 9.8 - 12.8 seconds    INR 1.1 0.9 - 1.1   Type And Screen   Result Value Ref Range    ABO TYPE O     Rh TYPE POS     ANTIBODY SCREEN NEG    ECG 12 lead   Result Value Ref Range    Ventricular Rate 78 BPM    Atrial Rate 78 BPM    NJ Interval 152 ms    QRS Duration 138 ms    QT Interval 442 ms    QTC Calculation(Bazett) 503 ms    P Axis -23 degrees    R Axis -63 degrees    T Axis 32 degrees    QRS Count 13 beats    Q Onset 207 ms    P Onset 131 ms    P Offset 177 ms    T Offset 428 ms    QTC Fredericia 482 ms   Comprehensive metabolic panel    Result Value Ref Range    Glucose 135 (H) 74 - 99 mg/dL    Sodium 140 136 - 145 mmol/L    Potassium 3.7 3.5 - 5.3 mmol/L    Chloride 105 98 - 107 mmol/L    Bicarbonate 29 21 - 32 mmol/L    Anion Gap 10 10 - 20 mmol/L    Urea Nitrogen 18 6 - 23 mg/dL    Creatinine 0.63 0.50 - 1.05 mg/dL    eGFR >90 >60 mL/min/1.73m*2    Calcium 8.4 (L) 8.6 - 10.3 mg/dL    Albumin 3.2 (L) 3.4 - 5.0 g/dL    Alkaline Phosphatase 163 (H) 33 - 136 U/L    Total Protein 5.6 (L) 6.4 - 8.2 g/dL     (H) 9 - 39 U/L    Bilirubin, Total 1.5 (H) 0.0 - 1.2 mg/dL     (H) 7 - 45 U/L   CBC and Auto Differential   Result Value Ref Range    WBC 2.2 (L) 4.4 - 11.3 x10*3/uL    nRBC 0.0 0.0 - 0.0 /100 WBCs    RBC 4.16 4.00 - 5.20 x10*6/uL    Hemoglobin 12.1 12.0 - 16.0 g/dL    Hematocrit 38.0 36.0 - 46.0 %    MCV 91 80 - 100 fL    MCH 29.1 26.0 - 34.0 pg    MCHC 31.8 (L) 32.0 - 36.0 g/dL    RDW 13.4 11.5 - 14.5 %    Platelets 146 (L) 150 - 450 x10*3/uL    Neutrophils % 46.5 40.0 - 80.0 %    Immature Granulocytes %, Automated 0.5 0.0 - 0.9 %    Lymphocytes % 39.3 13.0 - 44.0 %    Monocytes % 9.1 2.0 - 10.0 %    Eosinophils % 3.7 0.0 - 6.0 %    Basophils % 0.9 0.0 - 2.0 %    Neutrophils Absolute 1.02 (L) 1.20 - 7.70 x10*3/uL    Immature Granulocytes Absolute, Automated 0.01 0.00 - 0.70 x10*3/uL    Lymphocytes Absolute 0.86 (L) 1.20 - 4.80 x10*3/uL    Monocytes Absolute 0.20 0.10 - 1.00 x10*3/uL    Eosinophils Absolute 0.08 0.00 - 0.70 x10*3/uL    Basophils Absolute 0.02 0.00 - 0.10 x10*3/uL   Magnesium   Result Value Ref Range    Magnesium 1.80 1.60 - 2.40 mg/dL   Bilirubin, Direct   Result Value Ref Range    Bilirubin, Direct 0.7 (H) 0.0 - 0.3 mg/dL   Hepatitis panel, acute   Result Value Ref Range    Hepatitis B Surface AG Nonreactive Nonreactive    Hepatitis A  AB- IgM Nonreactive Nonreactive    Hepatitis B Core AB; IgM Nonreactive Nonreactive    Hepatitis C AB Nonreactive Nonreactive   Comprehensive metabolic panel   Result Value Ref  Range    Glucose 83 74 - 99 mg/dL    Sodium 139 136 - 145 mmol/L    Potassium 4.2 3.5 - 5.3 mmol/L    Chloride 104 98 - 107 mmol/L    Bicarbonate 30 21 - 32 mmol/L    Anion Gap 9 (L) 10 - 20 mmol/L    Urea Nitrogen 20 6 - 23 mg/dL    Creatinine 0.74 0.50 - 1.05 mg/dL    eGFR 90 >60 mL/min/1.73m*2    Calcium 8.4 (L) 8.6 - 10.3 mg/dL    Albumin 3.1 (L) 3.4 - 5.0 g/dL    Alkaline Phosphatase 171 (H) 33 - 136 U/L    Total Protein 5.4 (L) 6.4 - 8.2 g/dL     (H) 9 - 39 U/L    Bilirubin, Total 0.7 0.0 - 1.2 mg/dL     (H) 7 - 45 U/L   CBC and Auto Differential   Result Value Ref Range    WBC 3.2 (L) 4.4 - 11.3 x10*3/uL    nRBC 0.0 0.0 - 0.0 /100 WBCs    RBC 4.09 4.00 - 5.20 x10*6/uL    Hemoglobin 12.1 12.0 - 16.0 g/dL    Hematocrit 37.7 36.0 - 46.0 %    MCV 92 80 - 100 fL    MCH 29.6 26.0 - 34.0 pg    MCHC 32.1 32.0 - 36.0 g/dL    RDW 13.5 11.5 - 14.5 %    Platelets 179 150 - 450 x10*3/uL    Neutrophils % 52.1 40.0 - 80.0 %    Immature Granulocytes %, Automated 0.3 0.0 - 0.9 %    Lymphocytes % 33.4 13.0 - 44.0 %    Monocytes % 9.6 2.0 - 10.0 %    Eosinophils % 3.7 0.0 - 6.0 %    Basophils % 0.9 0.0 - 2.0 %    Neutrophils Absolute 1.68 1.20 - 7.70 x10*3/uL    Immature Granulocytes Absolute, Automated 0.01 0.00 - 0.70 x10*3/uL    Lymphocytes Absolute 1.08 (L) 1.20 - 4.80 x10*3/uL    Monocytes Absolute 0.31 0.10 - 1.00 x10*3/uL    Eosinophils Absolute 0.12 0.00 - 0.70 x10*3/uL    Basophils Absolute 0.03 0.00 - 0.10 x10*3/uL   Magnesium   Result Value Ref Range    Magnesium 1.82 1.60 - 2.40 mg/dL        US right upper quadrant   Final Result   Status post cholecystectomy.        Heterogeneous parenchyma could be related to cirrhosis. No evidence   of upper abdominal ascites.             MACRO:   None.        Signed by: Rigoberto Chilel 12/10/2024 6:19 PM   Dictation workstation:   [id number]      XR chest 1 view   Final Result   1.  Emphysema/COPD with atelectasis/scarring.                  MACRO:   None         Signed by: Kelley Cope 12/9/2024 7:15 PM   Dictation workstation:   [id number]      CT pelvis wo IV contrast   Final Result   1.  Fractures of the left superior and inferior pubic symphysis as   described.   2. Left hip prostheses.   3. Partially limited evaluation.        MACRO:   None        Signed by: Kelley Cope 12/9/2024 7:28 PM   Dictation workstation:   [id number]      XR hip left with pelvis when performed 2 or 3 views   Final Result   Acute fracture of the left superior and possibly inferior, pubic   ramus suspected. CT of the pelvis is recommended for further   evaluation.             MACRO:   None.        Signed by: Rigoberto Chilel 12/9/2024 5:09 PM   Dictation workstation:   [id number]          No echocardiogram results found for the past 14 days    DISCHARGE MEDICATIONS        Your medication list        START taking these medications        Instructions Last Dose Given Next Dose Due   cyclobenzaprine 5 mg tablet  Commonly known as: Flexeril      Take 1 tablet (5 mg) by mouth 3 times a day as needed for muscle spasms.       hydroCHLOROthiazide 12.5 mg capsule  Commonly known as: Microzide      Take 1 capsule (12.5 mg) by mouth once daily.       oxyCODONE 5 mg immediate release tablet  Commonly known as: Roxicodone      Take 1 tablet (5 mg) by mouth every 6 hours if needed for severe pain (7 - 10).              STOP taking these medications      traMADol 50 mg tablet  Commonly known as: Ultram                  Where to Get Your Medications        You can get these medications from any pharmacy    Bring a paper prescription for each of these medications  oxyCODONE 5 mg immediate release tablet       Information about where to get these medications is not yet available    Ask your nurse or doctor about these medications  cyclobenzaprine 5 mg tablet  hydroCHLOROthiazide 12.5 mg capsule         OUTPATIENT FOLLOW-UP     Future Appointments   Date Time Provider Department Center   12/12/2024  11:00 AM Ashish RAMIREZ MD [id number] [place]   1/21/2025  9:15 AM ANUPAM Mueller-CNP [id number] [place]   3/19/2025 11:30 AM Carole Davis MD [id number] [place]

## 2024-12-11 NOTE — CARE PLAN
Problem: Chronic Conditions and Co-morbidities  Goal: Patient's chronic conditions and co-morbidity symptoms are monitored and maintained or improved  Outcome: Progressing     Problem: Pain  Goal: Takes deep breaths with improved pain control throughout the shift  Outcome: Progressing  Goal: Turns in bed with improved pain control throughout the shift  Outcome: Progressing  Goal: Walks with improved pain control throughout the shift  Outcome: Progressing  Goal: Performs ADL's with improved pain control throughout shift  Outcome: Progressing  Goal: Participates in PT with improved pain control throughout the shift  Outcome: Progressing  Goal: Free from opioid side effects throughout the shift  Outcome: Progressing  Goal: Free from acute confusion related to pain meds throughout the shift  Outcome: Progressing   The patient's goals for the shift include rest and comfort    The clinical goals for the shift include Pain management

## 2024-12-11 NOTE — PROGRESS NOTES
12/11/24 0943   Discharge Planning   Home or Post Acute Services Post acute facilities (Rehab/SNF/etc)   Type of Post Acute Facility Services Rehab;Skilled nursing   Expected Discharge Disposition SNF   Does the patient need discharge transport arranged? Yes   RoundTrip coordination needed? Yes   Has discharge transport been arranged? Yes   What day is the transport expected? 12/11/24   What time is the transport expected? 1230     Notified that SNF auth received for [facility], Attending notified and states pt is medically ready for discharge today. Pt notified and in agreement to DC to SNF today. ZHANE Doan notified.    Update: DSC notified to send 7000 in [place], GF, DC summary and AVS attached in Careport. Transport requested for 12:30 PM, [facility] report number [phone] given to pts RN.

## 2024-12-12 ENCOUNTER — APPOINTMENT (OUTPATIENT)
Dept: SURGERY | Facility: CLINIC | Age: 64
End: 2024-12-12
Payer: MEDICARE

## 2024-12-12 ENCOUNTER — OFFICE VISIT (OUTPATIENT)
Dept: GERIATRIC MEDICINE | Age: 64
End: 2024-12-12
Payer: MEDICARE

## 2024-12-12 DIAGNOSIS — I10 ESSENTIAL HYPERTENSION: ICD-10-CM

## 2024-12-12 DIAGNOSIS — I85.10 ESOPHAGEAL VARICES IN ALCOHOLIC CIRRHOSIS (HCC): ICD-10-CM

## 2024-12-12 DIAGNOSIS — S32.512D: Primary | ICD-10-CM

## 2024-12-12 DIAGNOSIS — R26.81 UNSTEADINESS: ICD-10-CM

## 2024-12-12 DIAGNOSIS — K74.60 CIRRHOSIS OF LIVER WITHOUT ASCITES, UNSPECIFIED HEPATIC CIRRHOSIS TYPE (HCC): ICD-10-CM

## 2024-12-12 DIAGNOSIS — K70.30 ESOPHAGEAL VARICES IN ALCOHOLIC CIRRHOSIS (HCC): ICD-10-CM

## 2024-12-12 LAB
HSV1 DNA BLD QL NAA+PROBE: NOT DETECTED
HSV2 DNA BLD QL NAA+PROBE: NOT DETECTED

## 2024-12-12 PROCEDURE — 99305 1ST NF CARE MODERATE MDM 35: CPT | Performed by: INTERNAL MEDICINE

## 2024-12-12 PROCEDURE — 1123F ACP DISCUSS/DSCN MKR DOCD: CPT | Performed by: INTERNAL MEDICINE

## 2024-12-13 LAB
HCV RNA SERPL NAA+PROBE-ACNC: NOT DETECTED K[IU]/ML
HCV RNA SERPL NAA+PROBE-LOG IU: NORMAL {LOG_IU}/ML

## 2024-12-15 LAB
ATRIAL RATE: 78 BPM
P AXIS: -23 DEGREES
P OFFSET: 177 MS
P ONSET: 131 MS
PR INTERVAL: 152 MS
Q ONSET: 207 MS
QRS COUNT: 13 BEATS
QRS DURATION: 138 MS
QT INTERVAL: 442 MS
QTC CALCULATION(BAZETT): 503 MS
QTC FREDERICIA: 482 MS
R AXIS: -63 DEGREES
T AXIS: 32 DEGREES
T OFFSET: 428 MS
VENTRICULAR RATE: 78 BPM

## 2024-12-16 LAB
ALBUMIN: 3.8 G/DL
ALP BLD-CCNC: 164 U/L
ALT SERPL-CCNC: 43 U/L
ANION GAP SERPL CALCULATED.3IONS-SCNC: NORMAL MMOL/L
AST SERPL-CCNC: 19 U/L
BILIRUB SERPL-MCNC: 0.5 MG/DL (ref 0.1–1.4)
BUN BLDV-MCNC: 34 MG/DL
CALCIUM SERPL-MCNC: 9.1 MG/DL
CHLORIDE BLD-SCNC: 103 MMOL/L
CO2: 30 MMOL/L
CREAT SERPL-MCNC: 0.8 MG/DL
GFR, ESTIMATED: 72
GLUCOSE BLD-MCNC: 122 MG/DL
POTASSIUM SERPL-SCNC: 3.6 MMOL/L
SODIUM BLD-SCNC: 143 MMOL/L
TOTAL PROTEIN: 6.4 G/DL (ref 6.4–8.2)

## 2024-12-17 ENCOUNTER — OFFICE VISIT (OUTPATIENT)
Dept: GERIATRIC MEDICINE | Age: 64
End: 2024-12-17
Payer: MEDICARE

## 2024-12-17 DIAGNOSIS — I10 ESSENTIAL HYPERTENSION: ICD-10-CM

## 2024-12-17 DIAGNOSIS — R52 PAIN: ICD-10-CM

## 2024-12-17 DIAGNOSIS — S32.502B: Primary | ICD-10-CM

## 2024-12-17 DIAGNOSIS — J44.9 CHRONIC OBSTRUCTIVE PULMONARY DISEASE, UNSPECIFIED COPD TYPE (HCC): ICD-10-CM

## 2024-12-17 DIAGNOSIS — R10.2 PELVIC PAIN: Primary | ICD-10-CM

## 2024-12-17 PROCEDURE — 99308 SBSQ NF CARE LOW MDM 20: CPT | Performed by: NURSE PRACTITIONER

## 2024-12-17 PROCEDURE — 1123F ACP DISCUSS/DSCN MKR DOCD: CPT | Performed by: NURSE PRACTITIONER

## 2024-12-17 RX ORDER — OXYCODONE HYDROCHLORIDE 5 MG/1
5 TABLET ORAL EVERY 4 HOURS PRN
Qty: 40 TABLET | Refills: 0 | Status: SHIPPED | OUTPATIENT
Start: 2024-12-17 | End: 2024-12-24

## 2024-12-20 ENCOUNTER — OFFICE VISIT (OUTPATIENT)
Dept: GERIATRIC MEDICINE | Age: 64
End: 2024-12-20
Payer: MEDICARE

## 2024-12-20 DIAGNOSIS — R52 PAIN: Primary | ICD-10-CM

## 2024-12-20 DIAGNOSIS — R26.81 UNSTEADINESS: ICD-10-CM

## 2024-12-20 DIAGNOSIS — S32.502B: ICD-10-CM

## 2024-12-20 DIAGNOSIS — M62.81 MUSCLE WEAKNESS (GENERALIZED): ICD-10-CM

## 2024-12-20 DIAGNOSIS — I10 ESSENTIAL HYPERTENSION: ICD-10-CM

## 2024-12-20 DIAGNOSIS — J44.9 CHRONIC OBSTRUCTIVE PULMONARY DISEASE, UNSPECIFIED COPD TYPE (HCC): ICD-10-CM

## 2024-12-20 PROCEDURE — 1123F ACP DISCUSS/DSCN MKR DOCD: CPT | Performed by: NURSE PRACTITIONER

## 2024-12-20 PROCEDURE — 99308 SBSQ NF CARE LOW MDM 20: CPT | Performed by: NURSE PRACTITIONER

## 2024-12-24 ENCOUNTER — OFFICE VISIT (OUTPATIENT)
Dept: GERIATRIC MEDICINE | Age: 64
End: 2024-12-24

## 2024-12-24 DIAGNOSIS — R26.81 UNSTEADINESS: ICD-10-CM

## 2024-12-24 DIAGNOSIS — R52 PAIN: Primary | ICD-10-CM

## 2024-12-24 DIAGNOSIS — J44.9 CHRONIC OBSTRUCTIVE PULMONARY DISEASE, UNSPECIFIED COPD TYPE (HCC): ICD-10-CM

## 2024-12-24 DIAGNOSIS — S32.502B: ICD-10-CM

## 2024-12-24 DIAGNOSIS — M62.81 MUSCLE WEAKNESS (GENERALIZED): ICD-10-CM

## 2024-12-24 DIAGNOSIS — I10 ESSENTIAL HYPERTENSION: ICD-10-CM

## 2024-12-26 ENCOUNTER — OFFICE VISIT (OUTPATIENT)
Dept: GERIATRIC MEDICINE | Age: 64
End: 2024-12-26

## 2024-12-26 DIAGNOSIS — I85.10 ESOPHAGEAL VARICES IN ALCOHOLIC CIRRHOSIS (HCC): ICD-10-CM

## 2024-12-26 DIAGNOSIS — J44.9 CHRONIC OBSTRUCTIVE PULMONARY DISEASE, UNSPECIFIED COPD TYPE (HCC): ICD-10-CM

## 2024-12-26 DIAGNOSIS — K70.30 ESOPHAGEAL VARICES IN ALCOHOLIC CIRRHOSIS (HCC): ICD-10-CM

## 2024-12-27 ENCOUNTER — OFFICE VISIT (OUTPATIENT)
Dept: GERIATRIC MEDICINE | Age: 64
End: 2024-12-27
Payer: MEDICARE

## 2024-12-27 DIAGNOSIS — Z00.00 ENCOUNTER FOR SUBSEQUENT ANNUAL WELLNESS VISIT (AWV) IN MEDICARE PATIENT: ICD-10-CM

## 2024-12-27 DIAGNOSIS — R26.81 UNSTEADINESS: ICD-10-CM

## 2024-12-27 DIAGNOSIS — I10 ESSENTIAL HYPERTENSION: ICD-10-CM

## 2024-12-27 DIAGNOSIS — Z00.00 MEDICARE ANNUAL WELLNESS VISIT, SUBSEQUENT: Primary | ICD-10-CM

## 2024-12-27 DIAGNOSIS — Z00.00 ROUTINE GENERAL MEDICAL EXAMINATION AT A HEALTH CARE FACILITY: ICD-10-CM

## 2024-12-27 DIAGNOSIS — R26.2 DIFFICULTY IN WALKING: ICD-10-CM

## 2024-12-27 DIAGNOSIS — R10.2 PELVIC PAIN: ICD-10-CM

## 2024-12-27 PROCEDURE — 1123F ACP DISCUSS/DSCN MKR DOCD: CPT | Performed by: NURSE PRACTITIONER

## 2024-12-27 PROCEDURE — 3079F DIAST BP 80-89 MM HG: CPT | Performed by: NURSE PRACTITIONER

## 2024-12-27 PROCEDURE — 3077F SYST BP >= 140 MM HG: CPT | Performed by: NURSE PRACTITIONER

## 2024-12-27 PROCEDURE — G0439 PPPS, SUBSEQ VISIT: HCPCS | Performed by: NURSE PRACTITIONER

## 2024-12-27 PROCEDURE — 99316 NF DSCHRG MGMT 30 MIN+: CPT | Performed by: NURSE PRACTITIONER

## 2024-12-30 ENCOUNTER — PATIENT OUTREACH (OUTPATIENT)
Dept: PRIMARY CARE | Facility: CLINIC | Age: 64
End: 2024-12-30
Payer: MEDICARE

## 2024-12-30 VITALS
RESPIRATION RATE: 18 BRPM | WEIGHT: 130.8 LBS | HEART RATE: 77 BPM | DIASTOLIC BLOOD PRESSURE: 80 MMHG | HEIGHT: 62 IN | BODY MASS INDEX: 24.07 KG/M2 | SYSTOLIC BLOOD PRESSURE: 143 MMHG | OXYGEN SATURATION: 97 % | TEMPERATURE: 97.8 F

## 2024-12-30 PROBLEM — Z00.00 ENCOUNTER FOR SUBSEQUENT ANNUAL WELLNESS VISIT (AWV) IN MEDICARE PATIENT: Status: ACTIVE | Noted: 2024-12-30

## 2024-12-30 PROBLEM — R10.2 PELVIC PAIN: Status: ACTIVE | Noted: 2024-12-30

## 2024-12-30 PROBLEM — R26.2 DIFFICULTY IN WALKING: Status: ACTIVE | Noted: 2024-12-30

## 2024-12-30 PROBLEM — R26.81 UNSTEADINESS: Status: ACTIVE | Noted: 2024-12-30

## 2024-12-30 SDOH — ECONOMIC STABILITY: FOOD INSECURITY: WITHIN THE PAST 12 MONTHS, YOU WORRIED THAT YOUR FOOD WOULD RUN OUT BEFORE YOU GOT MONEY TO BUY MORE.: NEVER TRUE

## 2024-12-30 SDOH — ECONOMIC STABILITY: FOOD INSECURITY: WITHIN THE PAST 12 MONTHS, THE FOOD YOU BOUGHT JUST DIDN'T LAST AND YOU DIDN'T HAVE MONEY TO GET MORE.: NEVER TRUE

## 2024-12-30 SDOH — ECONOMIC STABILITY: INCOME INSECURITY: HOW HARD IS IT FOR YOU TO PAY FOR THE VERY BASICS LIKE FOOD, HOUSING, MEDICAL CARE, AND HEATING?: NOT HARD AT ALL

## 2024-12-30 ASSESSMENT — PATIENT HEALTH QUESTIONNAIRE - PHQ9
SUM OF ALL RESPONSES TO PHQ QUESTIONS 1-9: 16
SUM OF ALL RESPONSES TO PHQ QUESTIONS 1-9: 16
7. TROUBLE CONCENTRATING ON THINGS, SUCH AS READING THE NEWSPAPER OR WATCHING TELEVISION: MORE THAN HALF THE DAYS
9. THOUGHTS THAT YOU WOULD BE BETTER OFF DEAD, OR OF HURTING YOURSELF: NOT AT ALL
10. IF YOU CHECKED OFF ANY PROBLEMS, HOW DIFFICULT HAVE THESE PROBLEMS MADE IT FOR YOU TO DO YOUR WORK, TAKE CARE OF THINGS AT HOME, OR GET ALONG WITH OTHER PEOPLE: SOMEWHAT DIFFICULT
1. LITTLE INTEREST OR PLEASURE IN DOING THINGS: MORE THAN HALF THE DAYS
3. TROUBLE FALLING OR STAYING ASLEEP: NEARLY EVERY DAY
SUM OF ALL RESPONSES TO PHQ QUESTIONS 1-9: 16
SUM OF ALL RESPONSES TO PHQ QUESTIONS 1-9: 16
SUM OF ALL RESPONSES TO PHQ9 QUESTIONS 1 & 2: 4
2. FEELING DOWN, DEPRESSED OR HOPELESS: MORE THAN HALF THE DAYS
5. POOR APPETITE OR OVEREATING: MORE THAN HALF THE DAYS
6. FEELING BAD ABOUT YOURSELF - OR THAT YOU ARE A FAILURE OR HAVE LET YOURSELF OR YOUR FAMILY DOWN: SEVERAL DAYS
4. FEELING TIRED OR HAVING LITTLE ENERGY: MORE THAN HALF THE DAYS
8. MOVING OR SPEAKING SO SLOWLY THAT OTHER PEOPLE COULD HAVE NOTICED. OR THE OPPOSITE, BEING SO FIGETY OR RESTLESS THAT YOU HAVE BEEN MOVING AROUND A LOT MORE THAN USUAL: MORE THAN HALF THE DAYS

## 2024-12-30 ASSESSMENT — ANXIETY QUESTIONNAIRES
5. BEING SO RESTLESS THAT IT IS HARD TO SIT STILL: SEVERAL DAYS
1. FEELING NERVOUS, ANXIOUS, OR ON EDGE: SEVERAL DAYS
GAD7 TOTAL SCORE: 6
6. BECOMING EASILY ANNOYED OR IRRITABLE: SEVERAL DAYS
2. NOT BEING ABLE TO STOP OR CONTROL WORRYING: NOT AT ALL
IF YOU CHECKED OFF ANY PROBLEMS ON THIS QUESTIONNAIRE, HOW DIFFICULT HAVE THESE PROBLEMS MADE IT FOR YOU TO DO YOUR WORK, TAKE CARE OF THINGS AT HOME, OR GET ALONG WITH OTHER PEOPLE: SOMEWHAT DIFFICULT
7. FEELING AFRAID AS IF SOMETHING AWFUL MIGHT HAPPEN: SEVERAL DAYS
4. TROUBLE RELAXING: SEVERAL DAYS
3. WORRYING TOO MUCH ABOUT DIFFERENT THINGS: SEVERAL DAYS

## 2024-12-30 NOTE — PROGRESS NOTES
Medicare Annual Wellness Visit    Nehal Martin is here for Medicare AWV    Assessment & Plan   Encounter for subsequent annual wellness visit (AWV) in Medicare patient  Essential hypertension  Unsteadiness  Difficulty in walking  Pelvic pain      HTN:  Continue HCTZ as ordered.  Follow up with PCP in 1-2 weeks.  Unsteadiness/Difficulty walking: Discharge with HHC, PT/OT, and skilled nursing.    Pelvic pain s/p Pelvic fracture:  Decrease Oxycodone to 5 mg po every 8 hours as needed, script written #30.  Follow up with ortho as ordered.    Discharge > 31 minutes time spent on the discharge of this patient.  Time was taken to review chart, discuss plans with discharge planner, nursing, reconciling medications, discussing plan and answering questions with patient. Pertinent POC, labs, have been reviewed.  Final examination of the patient, instructions for follow up with PCP and relevant specialists within 1-2 weeks of discharge, preparation of discharge records instructions, prescriptions, and clear identification of reasons to return to the emergency room.       Recommendations for Preventive Services Due: see orders and patient instructions/AVS.  Recommended screening schedule for the next 5-10 years is provided to the patient in written form: see Patient Instructions/AVS.     No follow-ups on file.     Subjective   The following acute and/or chronic problems were also addressed today:  Hypertension, unsteadiness, difficulty walking, pelvic pain, and evaluation prior to discharging home.  Resident has participated in inpatient rehab, made progress.  She is going home today as she was cut by insurance, needs to continue PT and OT at home with home health care.  Chronic conditions such as hypertension is at baseline.  Pelvic pain is 5-7/10, on oxycodone with good effect.    Patient's complete Health Risk Assessment and screening values have been reviewed and are found in Flowsheets. The following problems were

## 2024-12-30 NOTE — PROGRESS NOTES
Discharge Facility:  [facility] ([facility])    Discharge Diagnosis:    Mechanical fall  L side superior-inferior pubic rami fractures  Elevated LFTs  HTN    Admission Date: 12/11/2024   Discharge Date:  12/27/2024     PCP Appointment Date: Tasked to office    Specialist Appointment Date:     [facility]     Schedule an appointment with Paul Cordova MD (Orthopaedic Surgery) in 5 weeks (1/15/2025)      Follow up with DOMINGO Gay (Gastroenterology); Their office will contact you with a follow up appointment for your elevated liver enzymes.    Hospital Encounter and Summary Linked: Yes     Two attempts were made to reach patient within two business days after discharge. Voicemail left with contact information for patient to call back with any non-emergent questions or concerns.

## 2024-12-31 PROBLEM — S32.502B: Status: ACTIVE | Noted: 2024-12-31

## 2024-12-31 PROBLEM — R52 PAIN: Status: ACTIVE | Noted: 2024-12-31

## 2024-12-31 PROBLEM — J44.9 CHRONIC OBSTRUCTIVE PULMONARY DISEASE (HCC): Status: ACTIVE | Noted: 2024-12-31

## 2024-12-31 NOTE — PROGRESS NOTES
05:07 PM    K 4.4 10/21/2024 05:07 PM     10/21/2024 05:07 PM    CO2 27 10/21/2024 05:07 PM    BUN 23 10/21/2024 05:07 PM    CREATININE 0.8 10/21/2024 05:07 PM    GLUCOSE 71 10/21/2024 05:07 PM    GLUCOSE 91 05/06/2012 06:40 AM    CALCIUM 9.2 10/21/2024 05:07 PM    LABGLOM 72 10/21/2024 05:07 PM                ASSESSMENT:  Patient Active Problem List   Diagnosis    Heart murmur    Cirrhosis (HCC)    Chronic back pain    ETOH abuse    Tobacco abuse    Esophageal varices in alcoholic cirrhosis (HCC)    Hiatal hernia    Essential hypertension    Esophageal reflux    Left hip pain    Status post closed fracture of left hip    Encounter for subsequent annual wellness visit (AWV) in Medicare patient    Unsteadiness    Difficulty in walking    Pelvic pain    Open fracture of left pubis (HCC)    Pain    Chronic obstructive pulmonary disease (HCC)         PLAN:   Diagnosis Orders   1. Open fracture of superior ramus of left pubis with routine healing, subsequent encounter        2. Cirrhosis of liver without ascites, unspecified hepatic cirrhosis type (HCC)        3. Esophageal varices in alcoholic cirrhosis (HCC)        4. Essential hypertension        5. Unsteadiness          Continue course physical therapy recommend therapy anti-inflammatory agent.  Continue with current support schedule goal reorientation able anti-inflammatory agents weaning pain regimen stable monitor bowel output skin surveillance goal Moxy functional status    Please note orders entered on site at facility after discussion with appropriate facility nursing/therapy/ / nutritional staff. Current longstanding medical problems and acute medical issues addressed with staff. Available data and data elements in on site paper chart reviewed and analyzed.  Current external consultant notes reviewed in on site chart. Ordered laboratory testing and imaging will be reviewed when available.   This patient's need for psychiatric medication

## 2025-01-01 NOTE — PROGRESS NOTES
Name: Nehal Martin   Facility: [address] [place]  [address]    Date: 12/17/2024     Subjective:     Chief Complaint   Patient presents with    Follow-up     Pelvic fracture       HPI  Nehal Martin is a 64 y.o. female being seen today for evaluation of left pelvis fracture, pain, COPD, hypertension.  Resident was admitted from [facility] after falling and sustaining a pelvic fracture.  Alert and orient x 3 cooperative care.  She was admitted to [facility] for rehab secondary to debility and deconditioning.  She is participating PT and OT and making progress.  She is complaining of pain 8/10, on oxycodone and Flexeril.  Weightbearing as tolerated, on hydrochlorothiazide for blood pressure.  Ice pack to left pelvic region 2-4 times a day.  Vital signs stable no fever.    Past Medical History:   Diagnosis Date    Cirrhosis ([facility])     Elevated LFTs     ETOH abuse     Heart murmur     Hypertension          Allergies:  Reviewed in nursing facility records  Medications:  Reviewed and reconciled in nursing facility records    Review of Systems  A 10 Point review of systems was performed and is negative unless previously stated      Objective:   See nursing facility record for vital signs.      Physical Exam  Constitutional:  in wheelchair, well-developed, in no acute distess  Pulmonary/Chest:  breathing unlabored, no use of accessory muscles, lung sounds clear and diminished in lower lobes, no shortness of breath at rest, dyspnea with activity  Cardiovascular:  S1-S2 regular, 2+ DP x 2, no edema  Abd/GI:  abdomen soft, round, non-distended, non-tender, active bowel sounds x 4 quadrants  : no SP tenderness,no CVA tenderness, no hematuria  MS/Extremities:  GALAN, ROM limited, abnormal gait  Psych:  A/O x 3, cooperative with care, no anxiety      Diagnosis Orders   1. Open fracture of left pubis, unspecified portion of pubis, initial encounter ([facility])        2. Pain        3.

## 2025-01-02 ENCOUNTER — APPOINTMENT (OUTPATIENT)
Dept: SURGERY | Facility: CLINIC | Age: 65
End: 2025-01-02
Payer: COMMERCIAL

## 2025-01-02 VITALS
OXYGEN SATURATION: 96 % | SYSTOLIC BLOOD PRESSURE: 156 MMHG | WEIGHT: 131 LBS | BODY MASS INDEX: 27.5 KG/M2 | DIASTOLIC BLOOD PRESSURE: 94 MMHG | HEART RATE: 82 BPM | HEIGHT: 58 IN

## 2025-01-02 DIAGNOSIS — N81.10 VAGINAL PROLAPSE: Primary | ICD-10-CM

## 2025-01-02 PROCEDURE — 1123F ACP DISCUSS/DSCN MKR DOCD: CPT | Performed by: SURGERY

## 2025-01-02 PROCEDURE — 3077F SYST BP >= 140 MM HG: CPT | Performed by: SURGERY

## 2025-01-02 PROCEDURE — 3080F DIAST BP >= 90 MM HG: CPT | Performed by: SURGERY

## 2025-01-02 PROCEDURE — 3008F BODY MASS INDEX DOCD: CPT | Performed by: SURGERY

## 2025-01-02 PROCEDURE — 99213 OFFICE O/P EST LOW 20 MIN: CPT | Performed by: SURGERY

## 2025-01-02 PROCEDURE — 1111F DSCHRG MED/CURRENT MED MERGE: CPT | Performed by: SURGERY

## 2025-01-02 PROCEDURE — 1159F MED LIST DOCD IN RCRD: CPT | Performed by: SURGERY

## 2025-01-02 RX ORDER — BISMUTH SUBSALICYLATE 262 MG
1 TABLET,CHEWABLE ORAL DAILY
COMMUNITY

## 2025-01-02 ASSESSMENT — ENCOUNTER SYMPTOMS
COUGH: 1
DYSURIA: 1
CONSTIPATION: 1
CONSTIPATION: 1

## 2025-01-02 NOTE — PROGRESS NOTES
Subjective   Patient ID: Antonieta Boswell is a 64 y.o. female who presents for New Patient Visit (Rectal prolapse consult. ).  HPI  54-year-old female referred for possible rectal prolapse.  Patient states that she does occasional constipation symptoms.  She states that she has an area of prolapse in the perineum not sure if it is rectum or vagina this is associated with urinary incontinence.  She has had a long history of urinary incontinence and stated she had enuresis in the past as a child.  No previous pelvic surgeries  Review of Systems   Gastrointestinal:  Positive for constipation.   Genitourinary:  Positive for dysuria.   All other systems reviewed and are negative.      Objective   Physical Exam  Genitourinary:     Comments: Examined with chaperone slightly decreased anal sphincter tone no prolapse, no masses    Physical Exam  Constitutional:       Appearance: Normal appearance.   HENT:      Head: Normocephalic and atraumatic.      Mouth/Throat:      Pharynx: Oropharynx is clear.   Eyes:      Pupils: Pupils are equal, round, and reactive to light.   Cardiovascular:      Rate and Rhythm: Normal rate and regular rhythm.   Pulmonary:      Effort: Pulmonary effort is normal.      Breath sounds: Normal breath sounds.   Abdominal:      General: Abdomen is flat. Bowel sounds are normal.      Palpations: Abdomen is soft.   Musculoskeletal:         General: Normal range of motion.      Cervical back: Normal range of motion.   Skin:     General: Skin is warm.   Neurological:      General: No focal deficit present.      Mental Status: She is alert. Mental status is at baseline.   Psychiatric:         Mood and Affect: Mood normal.       Assessment/Plan   Perineal prolapse does not appear to be anorectal will refer to gynecology to rule out vaginal prolapse         Ashish Morley MD 01/02/25 2:39 PM

## 2025-01-06 ENCOUNTER — APPOINTMENT (OUTPATIENT)
Dept: PHARMACY | Facility: HOSPITAL | Age: 65
End: 2025-01-06
Payer: COMMERCIAL

## 2025-01-06 DIAGNOSIS — S32.512A CLOSED FRACTURE OF SUPERIOR RAMUS OF LEFT PUBIS, INITIAL ENCOUNTER (MULTI): ICD-10-CM

## 2025-01-06 NOTE — ASSESSMENT & PLAN NOTE
Patient has been diagnosed with osteoporosis in 12.2024.   Patient is not on adequate calcium/vitamin D therapy at this time.  Rationale for plan: Will plan to start calcium/vitamin D supplementation. Discussed oral options (bisphosphonate) and Prolia. Patient has history of gastric bypass therefore bisphosphonate may not be most beneficial option. Patient to look into insurance coverage of Prolia as cost is a concern. Will allow for ~ 2 months of oral calcium/vitamin D supplementation and have patient complete upcoming labs for PCP visit in March 2025. Then depending on lab results, will plan to start other therapies.    Medication Changes:  START  Calcium 1200 mg/day  Vitamin D 2000 IU /day     Monitoring and Education:  Counseled patient on MOA, expectations, side effects, duration of therapy, contraindications, administration, and monitoring parameters  Answered all patient questions and concerns; provided [facility] phone number if issues/questions arise

## 2025-01-06 NOTE — PROGRESS NOTES
Clinical Pharmacy Appointment    Patient ID: Antonieta Boswell is a 64 y.o. female who presents for Osteoporosis    Pt is here for First appointment.     Referring Provider: Carole Davis, *  PCP: Carole Davis MD   Last visit with PCP: 11.19.2024   Next visit with PCP: 3.19.2025    Subjective   HPI  OSTEOPOROSIS  Does patient follow with Rheumatology: No  Date of diagnosis: 12/2024    Osteoporosis risk factors (non-modifiable)   race, female sex, and personal history of fracture as an adult     Osteoporosis risk factors (potentially modifiable):  alcoholism, low body weight, low calcium intake, and smoking history    Current Pharmacotherapy:  N/a    Clarifications to above regimen: N/a   Adverse Effects: N/a     Past Pharmacotherapy:  Patient sometimes takes calcium and vitamin D, however has not taken in many months    Additional Information:  Vitamin D: Not taking  Calcium: Not taking  Planned Dental Procedures: No planned procedures  Patient reports does not have any teeth    Relavent Past Medical history:  Co-morbid Conditions:  History of left hip replacement  Osteoarthritis  Fracture of the left superior and left inferior pubic ramus - 12/9/2024  History of alcoholism, methamphetamine use  Cirrhosis  History of gastric bypass  Any changes to home medications, OTCs, supplements? No  Any new allergies? No  Any new medical conditions? No    DEXA Bone Density Scan  === 12/09/24 ===  DEXA BONE DENSITY    - Impression -  DEXA:  According to World Health Organization criteria, classification is osteoporosis.    Follow-up recommended in two years or sooner as clinically warranted.    All images and detailed analysis are available on the  Radiology  PACS.    MACRO:  None    Drug Interactions  No relevant drug interactions were noted.    Objective   Allergies   Allergen Reactions    Nsaids (Non-Steroidal Anti-Inflammatory Drug) Other     s/p liver surgery     Social History     Social History  "Narrative    Not on file      Medication Review  Current Outpatient Medications   Medication Instructions    cyclobenzaprine (FLEXERIL) 5 mg, oral, 3 times daily PRN    hydroCHLOROthiazide (MICROZIDE) 12.5 mg, oral, Daily    multivitamin tablet 1 tablet, Daily    oxyCODONE (ROXICODONE) 5 mg, oral, Every 6 hours PRN      Vitals  BP Readings from Last 2 Encounters:   01/02/25 (!) 156/94   12/11/24 135/65     BMI Readings from Last 1 Encounters:   01/02/25 27.38 kg/m²      Labs  A1C  Lab Results   Component Value Date    HGBA1C 5.1 08/29/2023    HGBA1C 5.5 05/03/2022    HGBA1C 5.6 12/04/2020     BMP  Lab Results   Component Value Date    CALCIUM 8.4 (L) 12/11/2024     12/11/2024    K 4.2 12/11/2024    CO2 30 12/11/2024     12/11/2024    BUN 20 12/11/2024    CREATININE 0.74 12/11/2024    EGFR 90 12/11/2024     LFTs  Lab Results   Component Value Date     (H) 12/11/2024     (H) 12/11/2024    ALKPHOS 171 (H) 12/11/2024    BILITOT 0.7 12/11/2024     FLP  Lab Results   Component Value Date    TRIG 75 08/29/2023    CHOL 162 08/29/2023    LDLF 65 08/29/2023    HDL 82.3 08/29/2023     Urine Microalbumin  No results found for: \"MICROALBCREA\"  Weight Management  Wt Readings from Last 3 Encounters:   01/02/25 59.4 kg (131 lb)   12/09/24 59.9 kg (132 lb)   10/31/24 60.3 kg (133 lb)      There is no height or weight on file to calculate BMI.       Assessment/Plan   Problem List Items Addressed This Visit       Closed fracture of superior ramus of left pubis, initial encounter ([facility])     Patient has been diagnosed with osteoporosis in 12.2024.   Patient is not on adequate calcium/vitamin D therapy at this time.  Rationale for plan: Will plan to start calcium/vitamin D supplementation. Discussed oral options (bisphosphonate) and Prolia. Patient has history of gastric bypass therefore bisphosphonate may not be most beneficial option. Patient to look into insurance coverage of Prolia as cost is a concern. " Will allow for ~ 2 months of oral calcium/vitamin D supplementation and have patient complete upcoming labs for PCP visit in March 2025. Then depending on lab results, will plan to start other therapies.    Medication Changes:  START  Calcium 1200 mg/day  Vitamin D 2000 IU /day     Monitoring and Education:  Counseled patient on MOA, expectations, side effects, duration of therapy, contraindications, administration, and monitoring parameters  Answered all patient questions and concerns; provided [facility] phone number if issues/questions arise         Relevant Orders    Referral to Clinical Pharmacy     Clinical Pharmacist follow-up: 3/20/25 @ 10 am, Telehealth visit    Continue all meds under the continuation of care with the referring provider and clinical pharmacy team.    Thank you,  Anais Manzano, PharmD  Clinical Pharmacist  [phone]    Verbal consent to manage patient's drug therapy was obtained from the patient. They were informed they may decline to participate or withdraw from participation in pharmacy services at any time.

## 2025-01-09 ENCOUNTER — PATIENT OUTREACH (OUTPATIENT)
Dept: PRIMARY CARE | Facility: CLINIC | Age: 65
End: 2025-01-09
Payer: COMMERCIAL

## 2025-01-09 NOTE — PROGRESS NOTES
Unable to reach patient for a F/U call V/M box full      If no voicemail available call attempts x 2 were made to contact the patient to assist with any questions or concerns patient may have.

## 2025-01-13 PROBLEM — M62.81 MUSCLE WEAKNESS (GENERALIZED): Status: ACTIVE | Noted: 2025-01-13

## 2025-01-13 NOTE — PROGRESS NOTES
30-Jul-2021 Name: Nehal Martin   Facility: [address] [place]  [address]    Date: 12/20/2024     Subjective:     Chief Complaint   Patient presents with    Follow-up       HPI  Nehal Martin is a 64 y.o. female being seen today for evaluation of left pelvis fracture, pain, COPD, hypertension, unsteadiness and weakness.  Resident is up in the wheelchair, well-developed, no acute distress.  She is alert and orient x 3 cooperative care.  Resident is participating PT and OT and making progress.  PT- assist of 1 for transfers, is weightbearing as tolerated, ambulates 15 feet, OT- Min assist with ADL's.  She is complaining of pain 6/10, on oxycodone and Flexeril.  Vital signs stable no fever, on hydrochlorothiazide for blood pressure.  Ice pack to left pelvic region 2-4 times a day.  Appetite is fair, weight is stable.  Continue current plan of care.    Past Medical History:   Diagnosis Date    Cirrhosis (HCC)     Elevated LFTs     ETOH abuse     Heart murmur     Hypertension          Allergies:  Reviewed in nursing facility records  Medications:  Reviewed and reconciled in nursing facility records    Review of Systems  A 10 Point review of systems was performed and is negative unless previously stated      Objective:   See nursing facility record for vital signs.      Physical Exam  Constitutional:  in wheelchair, well-developed, in no acute distess  Pulmonary/Chest:  breathing unlabored, no use of accessory muscles, lung sounds clear and diminished in lower lobes, no shortness of breath at rest, dyspnea with activity  Cardiovascular:  S1-S2 regular, 2+ DP x 2, no edema  Abd/GI:  abdomen soft, round, non-distended, non-tender, active bowel sounds x 4 quadrants  : no SP tenderness,no CVA tenderness, no hematuria  MS/Extremities:  GALAN, ROM limited, abnormal gait  Psych:  A/O x 3, cooperative with care, no anxiety      Diagnosis Orders   1. Pain        2. Open fracture of left pubis, unspecified portion

## 2025-01-21 ENCOUNTER — APPOINTMENT (OUTPATIENT)
Dept: GASTROENTEROLOGY | Facility: CLINIC | Age: 65
End: 2025-01-21
Payer: MEDICARE

## 2025-01-21 DIAGNOSIS — Z98.84 HISTORY OF GASTRIC RESTRICTIVE SURGERY: ICD-10-CM

## 2025-01-21 DIAGNOSIS — F10.21 HISTORY OF ALCOHOLISM (MULTI): Primary | ICD-10-CM

## 2025-01-21 DIAGNOSIS — K70.30 ALCOHOLIC CIRRHOSIS OF LIVER WITHOUT ASCITES (MULTI): ICD-10-CM

## 2025-01-21 DIAGNOSIS — R79.89 ELEVATED LFTS: ICD-10-CM

## 2025-01-21 DIAGNOSIS — R11.2 NAUSEA AND VOMITING, UNSPECIFIED VOMITING TYPE: ICD-10-CM

## 2025-01-21 DIAGNOSIS — R93.2 ABNORMAL FINDINGS ON DIAGNOSTIC IMAGING OF LIVER AND BILIARY TRACT: ICD-10-CM

## 2025-01-21 DIAGNOSIS — R93.2 ABNORMAL LIVER DIAGNOSTIC IMAGING: ICD-10-CM

## 2025-01-21 PROCEDURE — 99215 OFFICE O/P EST HI 40 MIN: CPT | Performed by: NURSE PRACTITIONER

## 2025-01-21 PROCEDURE — 1123F ACP DISCUSS/DSCN MKR DOCD: CPT | Performed by: NURSE PRACTITIONER

## 2025-01-21 RX ORDER — PANTOPRAZOLE SODIUM 40 MG/1
40 TABLET, DELAYED RELEASE ORAL
Qty: 30 TABLET | Refills: 11 | Status: SHIPPED | OUTPATIENT
Start: 2025-01-21 | End: 2026-01-21

## 2025-01-21 RX ORDER — SPIRONOLACTONE 50 MG/1
50 TABLET, FILM COATED ORAL DAILY
Qty: 30 TABLET | Refills: 11 | Status: SHIPPED | OUTPATIENT
Start: 2025-01-21 | End: 2026-01-21

## 2025-01-21 NOTE — PROGRESS NOTES
"Assessment/Plan   Elevated LFTs with hepatocellular injury pattern.  Moderate transaminitis, hyperbilirubinemia, and elevated alkaline phosphatase  Possible alcohol cirrhosis with history of esophageal varices, ascites  Hypoalbuminemia albumin 3.1  No signs of decompensation.  No ascites, GIB, hepatic encephalopathy.  No thrombocytopenia or coagulopathy.    -Acute hepatitis panel negative, HCV RNA detected  -Toxicology panel negative  -History of alcohol abuse, currently sober x 10 years  -History of alcohol hepatitis 2014  -Right upper quadrant ultrasound unremarkable.  Gallbladder surgically absent.  Liver normal in size with cirrhotic morphology.  CBD measures 0.5 cm.    -Recommend CLD workup with autoimmune markers, alpha-1 antitrypsin, ceruloplasmin, iron and ferritin  -Recommend FibroScan to evaluate amount of fibrosis, steatosis  -Recommend low-salt diet, Recommend 1.5 g/kg protein daily  -Patient does report bilateral lower leg edema.  Will start spironolactone 50 mg p.o. once daily.  Will repeat BMP 1 week after starting diuretics to evaluate kidney function and electrolytes  -Recommend EGD to evaluate for varices.  Patient also reports having \"an ulcer\".  Not on PPI.  Will start pantoprazole milligrams p.o. once daily.  Patient instructed to take medication 30 minutes before breakfast.  -Antireflux lifestyle discussed  -Completely abstain from alcohol  -Avoid NSAIDs i.e. aspirin, Excedrin, Advil, Aleve, ibuprofen, Motrin, Naprosyn, naproxen  -Smoking cessation counseling provided  -Repeat CMP, direct bilirubin, CBC and INR  -Alpha-fetoprotein  -Avoid hepatotoxic medications  -No more than 2-3 g of Tylenol daily if needed  -Patient will need an office follow-up for accurate weight and assessment for management of elevated LFTs, chronic liver disease       ANUPAM Mueller-CNP      Subjective     History of Present Illness:   Antonieta Boswell is a 64 y.o. female with PMH of COPD, HTN,s/p sleeve " "gastrectomy (~2011 @ [facility]), gastro-gastric fistula (dx 7/30/15), dysphagia s/p PEG, gastric ulcer, alcohol abuse, esophagitis, cirrhosis with varices, Hepatitis C per patient and recent L hip replacement (October, 2024) then mechanical fall by slipping on ice resulting in left superior and inferior pubic symphysis fractures being evaluated today through a virtual visit for hospital follow-up.  Patient was hospitalized December 9-11, 2024 for the closed fractures of superior and inferior left pubis, evaluated by Ortho who recommended nonoperative management.  That hospital stay, patient was noted to have elevated LFTs and a hepatocellular pattern, unclear etiology.  Right upper quadrant ultrasound was unremarkable.  Acute viral hepatitis panel was negative.  LFTs improved during hospital stay (although still elevated) and patient was discharged to skilled nursing facility with recommendations for follow-up with GI outpatient.  Patient was seen by GI during that hospital stay.  Patient had no evidence of decompensated liver disease.  Denied abdominal pain, nausea, vomiting, diarrhea, black or bloody stools.  No altered mental status.  No increased abdominal girth or lower leg edema.  She was afebrile and hemodynamically stable.  She was hypertensive and started on hydrochlorothiazide by medicine team however states she did not receive a prescription to take this medication at home to being discharged from SNF.    Patient states she was diagnosed with hepatitis C in the past, unsure how she got it however does admit to a period of heavy alcohol abuse drinking multiple pints of vodka daily for a few years and polysubstance abuse with methamphetamine, diet pills and marijuana.  Patient states that she was diagnosed with cirrhosis at 1 time however stated her cirrhosis had \"resolved \".      CT abdomen pelvis from October, 2024 showed unremarkable liver.  -AST/ALT, total bilirubin downtrending.  AST 95-->284-->120.  ALT " 116-->185-->149.  Total bilirubin 0.8-->1.5-->0.7.  Alkaline phosphatase 131-->163-->171. Acute hepatitis panel negative.  Hepatitis C RNA negative.  RUQUS no intrahepatic or extrahepatic bile duct dilation.  CBD measures 0.5 cm.  Liver is normal in size.  Cirrhotic morphology.  No ascites.  Gallbladder surgically absent.  Visualized portions of the pancreas normal. Platelets normal 179, INR 1.1.  Hemoglobin stable 12.1. Albumin 3.1    Hospital records showed a hospitalization in June, 2014 for alcohol abuse, alcohol cirrhosis of liver with varices and ascites.  Patient states she was critically ill and told that she would die however she averted after receiving oral steroids.  Likely patient had alcohol hepatitis at this time.  She was eventually discharged from the hospital and admitted to [facility] for rehab in 2014.    Currently, patient denies any alcohol intake.  Patient states she has been sober for the last 10 years.  Patient does admit having a period of excessive alcohol intake drinking pints of vodka daily due to depression.  Appears patient had a hospitalization in 2014 for alcohol hepatitis versus cirrhosis.  Patient continues smoking 1 ppd , has been smoking for 52 years. H/o methamphetamines, diet pills abuse.    Review of Systems  ROS Negative unless otherwise stated above.    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     Social History   reports that she has been smoking cigarettes. She has never used smokeless tobacco. She reports that she does not currently use drugs after having used the following drugs: Marijuana and Methamphetamines. She reports that she does not drink alcohol.     Family History  family history includes Cancer in  her father and mother; Diabetes in her father; Diabetes type II in her father; Heart disease in her father.   Mother uterine  Grandfather testicle   Patient breast cancer lumpectomy, radiation, chemo     Allergies  Allergies   Allergen Reactions    Nsaids (Non-Steroidal Anti-Inflammatory Drug) Other     s/p liver surgery       Medications  Current Outpatient Medications   Medication Instructions    cyclobenzaprine (FLEXERIL) 5 mg, oral, 3 times daily PRN    hydroCHLOROthiazide (MICROZIDE) 12.5 mg, oral, Daily    multivitamin tablet 1 tablet, Daily    oxyCODONE (ROXICODONE) 5 mg, oral, Every 6 hours PRN        Objective   Virtual visit   present    General: A&Ox3, NAD.  Hearing good, speech good  Engaged in conversation  Asking/answering questions appropriately  Reports bilateral lower leg edema, mild however snug pants or socks leave imprints in her lower legs.  Denies yellowing of the eyes, skin or dark yellow urine.  Neuro: No focal deficits.  Denies flapping hands, jerking movements, unsteady gait.  Psych: Normal mood and affect.     Lab Results   Component Value Date    WBC 3.2 (L) 12/11/2024    WBC 2.2 (L) 12/10/2024    WBC 3.6 (L) 12/09/2024    HGB 12.1 12/11/2024    HGB 12.1 12/10/2024    HGB 12.3 12/09/2024    HCT 37.7 12/11/2024    HCT 38.0 12/10/2024    HCT 37.3 12/09/2024     12/11/2024     (L) 12/10/2024     (L) 12/09/2024     Lab Results   Component Value Date     12/11/2024    K 4.2 12/11/2024     12/11/2024    CO2 30 12/11/2024    BUN 20 12/11/2024    CREATININE 0.74 12/11/2024    GLUCOSE 83 12/11/2024    CALCIUM 8.4 (L) 12/11/2024       Lab Results   Component Value Date    ALKPHOS 171 (H) 12/11/2024    ALKPHOS 163 (H) 12/10/2024    ALKPHOS 131 12/09/2024    BILITOT 0.7 12/11/2024    BILITOT 1.5 (H) 12/10/2024    BILITOT 0.8 12/09/2024    BILIDIR 0.7 (H) 12/10/2024    PROT 5.4 (L) 12/11/2024    PROT 5.6 (L) 12/10/2024    PROT 6.3 (L) 12/09/2024     (H)  12/11/2024     (H) 12/10/2024     (H) 12/09/2024     (H) 12/11/2024     (H) 12/10/2024    AST 95 (H) 12/09/2024      Lab Results   Component Value Date    INR 1.1 12/09/2024

## 2025-01-22 ENCOUNTER — APPOINTMENT (OUTPATIENT)
Dept: ORTHOPEDIC SURGERY | Facility: CLINIC | Age: 65
End: 2025-01-22
Payer: MEDICARE

## 2025-01-24 ENCOUNTER — LAB (OUTPATIENT)
Dept: LAB | Facility: LAB | Age: 65
End: 2025-01-24
Payer: COMMERCIAL

## 2025-01-24 DIAGNOSIS — K70.30 ALCOHOLIC CIRRHOSIS OF LIVER WITHOUT ASCITES (MULTI): ICD-10-CM

## 2025-01-24 DIAGNOSIS — R93.2 ABNORMAL FINDINGS ON DIAGNOSTIC IMAGING OF LIVER AND BILIARY TRACT: ICD-10-CM

## 2025-01-24 DIAGNOSIS — R79.89 ELEVATED LFTS: ICD-10-CM

## 2025-01-24 LAB
ALBUMIN SERPL BCP-MCNC: 4.3 G/DL (ref 3.4–5)
ALP SERPL-CCNC: 127 U/L (ref 33–136)
ALT SERPL W P-5'-P-CCNC: 44 U/L (ref 7–45)
ANION GAP SERPL CALC-SCNC: 10 MMOL/L (ref 10–20)
AST SERPL W P-5'-P-CCNC: 57 U/L (ref 9–39)
BILIRUB DIRECT SERPL-MCNC: 0 MG/DL (ref 0–0.3)
BILIRUB SERPL-MCNC: 0.3 MG/DL (ref 0–1.2)
BUN SERPL-MCNC: 24 MG/DL (ref 6–23)
CALCIUM SERPL-MCNC: 9.4 MG/DL (ref 8.6–10.3)
CHLORIDE SERPL-SCNC: 109 MMOL/L (ref 98–107)
CO2 SERPL-SCNC: 29 MMOL/L (ref 21–32)
CREAT SERPL-MCNC: 0.79 MG/DL (ref 0.5–1.05)
EGFRCR SERPLBLD CKD-EPI 2021: 83 ML/MIN/1.73M*2
ERYTHROCYTE [DISTWIDTH] IN BLOOD BY AUTOMATED COUNT: 13.3 % (ref 11.5–14.5)
FERRITIN SERPL-MCNC: 15 NG/ML (ref 8–150)
GLUCOSE SERPL-MCNC: 90 MG/DL (ref 74–99)
HCT VFR BLD AUTO: 47.5 % (ref 36–46)
HGB BLD-MCNC: 14.9 G/DL (ref 12–16)
INR PPP: 1 (ref 0.9–1.1)
IRON SATN MFR SERPL: 7 % (ref 25–45)
IRON SERPL-MCNC: 34 UG/DL (ref 35–150)
MCH RBC QN AUTO: 29 PG (ref 26–34)
MCHC RBC AUTO-ENTMCNC: 31.4 G/DL (ref 32–36)
MCV RBC AUTO: 92 FL (ref 80–100)
NRBC BLD-RTO: 0 /100 WBCS (ref 0–0)
PLATELET # BLD AUTO: 210 X10*3/UL (ref 150–450)
POTASSIUM SERPL-SCNC: 4.1 MMOL/L (ref 3.5–5.3)
PROT SERPL-MCNC: 6.8 G/DL (ref 6.4–8.2)
PROTHROMBIN TIME: 10.8 SECONDS (ref 9.8–12.8)
RBC # BLD AUTO: 5.14 X10*6/UL (ref 4–5.2)
SODIUM SERPL-SCNC: 144 MMOL/L (ref 136–145)
TIBC SERPL-MCNC: 469 UG/DL (ref 240–445)
UIBC SERPL-MCNC: 435 UG/DL (ref 110–370)
WBC # BLD AUTO: 4.4 X10*3/UL (ref 4.4–11.3)

## 2025-01-24 PROCEDURE — 86381 MITOCHONDRIAL ANTIBODY EACH: CPT

## 2025-01-24 PROCEDURE — 86038 ANTINUCLEAR ANTIBODIES: CPT

## 2025-01-24 PROCEDURE — 82728 ASSAY OF FERRITIN: CPT

## 2025-01-24 PROCEDURE — 82103 ALPHA-1-ANTITRYPSIN TOTAL: CPT

## 2025-01-24 PROCEDURE — 82390 ASSAY OF CERULOPLASMIN: CPT

## 2025-01-24 PROCEDURE — 82248 BILIRUBIN DIRECT: CPT

## 2025-01-24 PROCEDURE — 82105 ALPHA-FETOPROTEIN SERUM: CPT

## 2025-01-24 PROCEDURE — 83550 IRON BINDING TEST: CPT

## 2025-01-24 PROCEDURE — 86015 ACTIN ANTIBODY EACH: CPT

## 2025-01-24 PROCEDURE — 80053 COMPREHEN METABOLIC PANEL: CPT

## 2025-01-24 PROCEDURE — 83540 ASSAY OF IRON: CPT

## 2025-01-24 PROCEDURE — 85610 PROTHROMBIN TIME: CPT

## 2025-01-24 PROCEDURE — 85027 COMPLETE CBC AUTOMATED: CPT

## 2025-01-24 NOTE — PROGRESS NOTES
SUBJECTIVE:  64-year-old woman seen for visit for source of liver COPD and recent nausea FEVER chills no change in bowel habits coughing intermittently no acute lightheadedness      ROS: Denies chest  The rest of the 14 point ROS negative    PHYSICAL EXAM: VSS per facility record  Oral mucosa is moist chest which breath sounds cardiovascular showed a regular abdomen soft nontender no rash skin showed no new break    ASSESSMENT & PLAN:   Diagnosis Orders   1. Esophageal varices in alcoholic cirrhosis (HCC)        2. Chronic obstructive pulmonary disease, unspecified COPD type (HCC)            No acute psychosis coughing feeling acute lightheadedness acute psychosis at this time.  Monitor for further cognitive decline monitor hepatic function          Past Medical History:   Diagnosis Date    Cirrhosis (HCC)     Elevated LFTs     ETOH abuse     Heart murmur     Hypertension          Past Surgical History:   Procedure Laterality Date    CARDIAC CATHETERIZATION  4-2013    clean \"luminal irregs\"    CHOLECYSTECTOMY  6/22/13    Lapchole    COLONOSCOPY  9/2011    polyps needs by 2016    GASTRIC BYPASS SURGERY  2003    LAPAROSCOPY  1992    OTHER SURGICAL HISTORY      skin removal from abd/weight loss    TOTAL HIP ARTHROPLASTY  4-2012    left hip    UPPER GASTROINTESTINAL ENDOSCOPY  9-26-11    marino-polyps    UPPER GASTROINTESTINAL ENDOSCOPY  5/18/14     DR. NEAL         Current Outpatient Medications on File Prior to Visit   Medication Sig Dispense Refill    aspirin 81 MG chewable tablet Take 1 tablet by mouth daily      Camphor-Menthol-Methyl Sal (SALONPAS) 3.1-6-10 % PTCH Apply topically every morning       No current facility-administered medications on file prior to visit.         Family History   Problem Relation Age of Onset    Diabetes Father     Heart Disease Father     Diabetes Sister     Thyroid Disease Sister     Cancer Other        Social History     Socioeconomic History    Marital status:      Spouse

## 2025-01-24 NOTE — PROGRESS NOTES
SP tenderness,no CVA tenderness, no hematuria  MS/Extremities:  GALAN, ROM limited, abnormal gait  Psych:  A/O x 3, cooperative with care, no anxiety      Diagnosis Orders   1. Pain        2. Open fracture of left pubis, unspecified portion of pubis, initial encounter ([facility])        3. Essential hypertension        4. Chronic obstructive pulmonary disease, unspecified COPD type ([facility])        5. Unsteadiness        6. Muscle weakness (generalized)                  Assessment and Plan:      1. Pain/Open fracture of left pubis, unspecified portion of pubis, initial encounter ([facility])  On oxycodone and Flexeril, continue as ordered.  Okay to discharge home.  Follow-up with PCP within 1 to 2 weeks of discharge.  Follow-up with Ortho as ordered.  Continue PT and OT at home with home health care.    2. Essential hypertension  At baseline, on hydrochlorothiazide, continue as ordered.    3. Chronic obstructive pulmonary disease, unspecified COPD type (HCC)  At baseline, not on oxygen.  No new orders.    4. Unsteadiness/muscle weakness  Improving, continue PT and OT at discharge with a focus on gait balance endurance and strengthening.      Reviewed labs:  Yes    12/20/24  GLUCOSE 94 mg/dL   Final      GLUCOSE, FASTING         65-99  mg/dL                               GLUCOSE, NON-FASTING      mg/dL        SODIUM 142 mEq/L 136-145  Final             POTASSIUM 3.8 mEq/L 3.5-5.3  Final             CHLORIDE 102 mEq/L   Final             CARBON DIOXIDE (CO2) 28 mEq/L 21-33  Final             BUN (UREA NITROGEN) 30 mg/dL 7-25 H Final             CREATININE 0.8 mg/dL 0.6-1.2  Final             BUN/CREATININE RATIO 38  6-22 H Final             GFR-AFRICAN AMERICAN 87 mL/min/1.73 m2 >60  Final             GFR-NON-AFRICAN AMERICAN 72 mL/min/1.73 m2 >60  Final          Stage of CKD     eGFR (mL/min/1.73 square meters)          Stage 1        >/= 90        or > 90          Stage 2        60 - 89          Stage 3        30 - 59

## 2025-01-25 LAB
A1AT SERPL NEPH-MCNC: 167 MG/DL (ref 84–218)
AFP SERPL-MCNC: <4 NG/ML (ref 0–9)
CERULOPLASMIN SERPL-MCNC: 31 MG/DL (ref 20–60)

## 2025-01-28 DIAGNOSIS — Z79.899 ENCOUNTER FOR MONITORING DIURETIC THERAPY: Primary | ICD-10-CM

## 2025-01-28 DIAGNOSIS — Z51.81 ENCOUNTER FOR MONITORING DIURETIC THERAPY: Primary | ICD-10-CM

## 2025-01-28 LAB
ANA SER QL HEP2 SUBST: NEGATIVE
MITOCHONDRIA AB SER QL IF: NEGATIVE
SMOOTH MUSCLE AB SER QL IF: NEGATIVE

## 2025-01-28 NOTE — PROGRESS NOTES
History: Antonieta is here for her left   pubic rami fracture .    Past medical history: Multiple  Medications: Multiple  Allergies: No known drug allergies    Please refer to the intake H&P regarding the patient's review of systems, family history and social history as was done today    HEENT: Normal  Lungs: Clear to auscultation  Heart: RRR  Abdomen: Soft, nontender  Skin: clear  Extremity: []   Contralateral exam is normal for strength, motion, stability and neurovascular assessment.    Radiographs: []     Assessment: []     Plan: []   All questions were answered today with the patient.    Scribe Attestation  By signing my name below, ICharlotte Scribe   attest that this documentation has been prepared under the direction and in the presence of Paul Cordova MD.

## 2025-01-29 ENCOUNTER — APPOINTMENT (OUTPATIENT)
Dept: ORTHOPEDIC SURGERY | Facility: CLINIC | Age: 65
End: 2025-01-29
Payer: COMMERCIAL

## 2025-01-30 PROBLEM — R52 PAIN: Status: RESOLVED | Noted: 2024-12-31 | Resolved: 2025-01-30

## 2025-02-03 ENCOUNTER — APPOINTMENT (OUTPATIENT)
Dept: OBSTETRICS AND GYNECOLOGY | Facility: CLINIC | Age: 65
End: 2025-02-03
Payer: COMMERCIAL

## 2025-02-03 VITALS
BODY MASS INDEX: 26.87 KG/M2 | WEIGHT: 128 LBS | SYSTOLIC BLOOD PRESSURE: 130 MMHG | HEIGHT: 58 IN | DIASTOLIC BLOOD PRESSURE: 70 MMHG

## 2025-02-03 DIAGNOSIS — R32 URINARY INCONTINENCE, UNSPECIFIED TYPE: ICD-10-CM

## 2025-02-03 PROCEDURE — 3078F DIAST BP <80 MM HG: CPT | Performed by: OBSTETRICS & GYNECOLOGY

## 2025-02-03 PROCEDURE — 1123F ACP DISCUSS/DSCN MKR DOCD: CPT | Performed by: OBSTETRICS & GYNECOLOGY

## 2025-02-03 PROCEDURE — A4561 PESSARY RUBBER, ANY TYPE: HCPCS | Performed by: OBSTETRICS & GYNECOLOGY

## 2025-02-03 PROCEDURE — 99205 OFFICE O/P NEW HI 60 MIN: CPT | Performed by: OBSTETRICS & GYNECOLOGY

## 2025-02-03 PROCEDURE — 3075F SYST BP GE 130 - 139MM HG: CPT | Performed by: OBSTETRICS & GYNECOLOGY

## 2025-02-03 PROCEDURE — 1159F MED LIST DOCD IN RCRD: CPT | Performed by: OBSTETRICS & GYNECOLOGY

## 2025-02-03 PROCEDURE — 3008F BODY MASS INDEX DOCD: CPT | Performed by: OBSTETRICS & GYNECOLOGY

## 2025-02-03 PROCEDURE — 57160 INSERT PESSARY/OTHER DEVICE: CPT | Performed by: OBSTETRICS & GYNECOLOGY

## 2025-02-03 RX ORDER — MIRABEGRON 25 MG/1
25 TABLET, FILM COATED, EXTENDED RELEASE ORAL DAILY
Qty: 30 TABLET | Refills: 11 | Status: SHIPPED | OUTPATIENT
Start: 2025-02-03 | End: 2025-03-05

## 2025-02-03 RX ORDER — HYDROCHLOROTHIAZIDE 12.5 MG/1
12.5 TABLET ORAL
COMMUNITY
Start: 2024-12-27 | End: 2025-02-03 | Stop reason: ALTCHOICE

## 2025-02-03 ASSESSMENT — PATIENT HEALTH QUESTIONNAIRE - PHQ9
10. IF YOU CHECKED OFF ANY PROBLEMS, HOW DIFFICULT HAVE THESE PROBLEMS MADE IT FOR YOU TO DO YOUR WORK, TAKE CARE OF THINGS AT HOME, OR GET ALONG WITH OTHER PEOPLE: NOT DIFFICULT AT ALL
2. FEELING DOWN, DEPRESSED OR HOPELESS: SEVERAL DAYS
1. LITTLE INTEREST OR PLEASURE IN DOING THINGS: SEVERAL DAYS
SUM OF ALL RESPONSES TO PHQ9 QUESTIONS 1 AND 2: 2

## 2025-02-03 NOTE — PROGRESS NOTES
GYN PROGRESS NOTE          CC:     Chief Complaint   Patient presents with    New Patient Visit     New patient    Chaperone: JENNI Mcdaniel         HPI:  Patient answers are not available for this visit.  HPI       New Patient Visit     Additional comments: New patient    Chaperone: JENNI Mcdaniel               Comments    Antonieta is a 64 year old here for mass coming out of vagina. She noticed it a couple of months ago.   She is also having urinary incontinence.   She broke her hip in October, had a fall, fractured her pelvis.          Last edited by Violeta Toscano CMA on 2/3/2025  3:20 PM.          She feels vaginal pressure at the end of the day or after being on the feet for a long time    Has  urinary urgency frequency or incontinence, nocturia ×2    Has no bowel symptoms no constipation or diarrhea.    Is currently not sexually active, no sexual function issues. Would like to be    Has pictures of vaginal wall prolapse    ROS:  GEN - no fevers or chills  RESP - no SOB or cough  GYN - see HPI      HISTORY:  Past Medical History:   Diagnosis Date    Abnormal mammogram 06/2022    left cat 5--invasive ductal carcinoma    Alcoholic gastritis without bleeding     Cirrhosis (Multi)     Esophageal varices in alcoholic cirrhosis (Multi)     History of mammogram 08/03/2024    cat 2    Pap test, as part of routine gynecological examination 01/2015    wnl, hpv neg     Past Surgical History:   Procedure Laterality Date    BARIATRIC SURGERY  2000    BREAST LUMPECTOMY      CHOLECYSTECTOMY  2010    TONSILLECTOMY  1964    with adenoidectomy    TOTAL HIP ARTHROPLASTY Left 2009     Social History     Socioeconomic History    Marital status:      Spouse name: Not on file    Number of children: Not on file    Years of education: Not on file    Highest education level: Not on file   Occupational History    Not on file   Tobacco Use    Smoking status: Every Day     Current packs/day: 0.50     Types: Cigarettes    Smokeless  tobacco: Never   Vaping Use    Vaping status: Never Used   Substance and Sexual Activity    Alcohol use: Never     Comment: h/o abuse    Drug use: Not Currently     Types: Marijuana, Methamphetamines     Comment: NOT CURRENTLY NOLAN CYR CMA    Sexual activity: Not Currently   Other Topics Concern    Not on file   Social History Narrative    Not on file     Social Drivers of Health     Financial Resource Strain: Low Risk  (12/30/2024)    Received from [facility]    Overall Financial Resource Strain (CARDIA)     Difficulty of Paying Living Expenses: Not hard at all   Recent Concern: Financial Resource Strain - High Risk (12/9/2024)    Overall Financial Resource Strain (CARDIA)     Difficulty of Paying Living Expenses: Very hard   Food Insecurity: No Food Insecurity (12/30/2024)    Received from [facility]    Hunger Vital Sign     Worried About Running Out of Food in the Last Year: Never true     Ran Out of Food in the Last Year: Never true   Transportation Needs: Unknown (12/30/2024)    Received from [facility]    PRAPARE - Transportation     Lack of Transportation (Medical): Not on file     Lack of Transportation (Non-Medical): No   Recent Concern: Transportation Needs - Unmet Transportation Needs (12/9/2024)    PRAPARE - Transportation     Lack of Transportation (Medical): Yes     Lack of Transportation (Non-Medical): Yes   Physical Activity: Inactive (12/30/2024)    Received from [facility]    Exercise Vital Sign     Days of Exercise per Week: 0 days     Minutes of Exercise per Session: 0 min   Stress: Not on file   Social Connections: Not on file   Intimate Partner Violence: Not At Risk (12/10/2024)    Humiliation, Afraid, Rape, and Kick questionnaire     Fear of Current or Ex-Partner: No     Emotionally Abused: No     Physically Abused: No     Sexually Abused: No   Housing Stability: Unknown (12/30/2024)    Received from  [facility]    Housing Stability Vital Sign     Unable to Pay for Housing in the Last Year: Not on file     Number of Times Moved in the Last Year: Not on file     Homeless in the Last Year: No   Recent Concern: Housing Stability - High Risk (12/9/2024)    Housing Stability Vital Sign     Unable to Pay for Housing in the Last Year: Yes     Number of Times Moved in the Last Year: 0     Homeless in the Last Year: No     Cancer-related family history includes Cancer in her father and mother.       PHYSICAL EXAM:  /70 (BP Location: Right arm, Patient Position: Sitting, BP Cuff Size: Adult)   Ht 1.473 m (4' 10\")   Wt 58.1 kg (128 lb)   BMI 26.75 kg/m²   Physical examination:  General: No distress  Neck: No masses  Respiratory: No respiratory distress  Abdomen: soft nontender no hernias  GYN: Normal vulvar skin normal clitoral white and clitoris labia majora and minora normal vaginal mucosa no lesions cervix normal uterine body not enlarged no enlargement or pain in bilateral adnexa   perianal area: without lesions    Popq -2-2-6//6.5,4,8//00-7  No leak with cough empty bladder by palpation    3 ring with support placed on patient request    IMPRESSION/PLAN:    64-year-old  stage posterior wall prolapse, #3 ring with support in place, urge dominant mixed incontinence  Follow-up in 1 month to evaluate for efficacy start Myrbetriq    Discussed options for prolapse management including: observation, pessary, surgical management  including colpocleisis, uterosacral ligament fixation or sacral colpopexy.   Discussed the need for bladder testing to identify stress incontinence.  Discussed risk of mesh including erosion and retention. Risks benefits alternatives discussed with the patient risks including bleeding infection or damage to surrounding tissues.  Bleeding requiring blood transfusion transfusion reaction or infection.  Infection of the superficial or deep spaces.  Damage to bladder bowel " ureters vascular structures abdominal wall.  Requiring further surgery acutely or prolonged hospitalization or returned to the operating room.  All questions answered to the best my ability.        Ric Drake MD

## 2025-02-04 DIAGNOSIS — Z79.899 ENCOUNTER FOR MONITORING DIURETIC THERAPY: ICD-10-CM

## 2025-02-04 DIAGNOSIS — Z51.81 ENCOUNTER FOR MONITORING DIURETIC THERAPY: ICD-10-CM

## 2025-02-05 ENCOUNTER — CLINICAL SUPPORT (OUTPATIENT)
Dept: PREADMISSION TESTING | Facility: HOSPITAL | Age: 65
End: 2025-02-05
Payer: COMMERCIAL

## 2025-02-05 VITALS — HEIGHT: 58 IN | BODY MASS INDEX: 26.61 KG/M2 | WEIGHT: 126.76 LBS

## 2025-02-05 NOTE — PREPROCEDURE INSTRUCTIONS

## 2025-02-07 ENCOUNTER — TELEPHONE (OUTPATIENT)
Dept: OBSTETRICS AND GYNECOLOGY | Facility: CLINIC | Age: 65
End: 2025-02-07
Payer: COMMERCIAL

## 2025-02-07 NOTE — TELEPHONE ENCOUNTER
Patient needs something else sent to pharmacy, Mybetriq is to expensive.  D[facility] in [place].

## 2025-02-10 ENCOUNTER — APPOINTMENT (OUTPATIENT)
Dept: GASTROENTEROLOGY | Facility: CLINIC | Age: 65
End: 2025-02-10
Payer: COMMERCIAL

## 2025-02-10 ENCOUNTER — CLINICAL SUPPORT (OUTPATIENT)
Dept: GASTROENTEROLOGY | Facility: CLINIC | Age: 65
End: 2025-02-10
Payer: COMMERCIAL

## 2025-02-10 DIAGNOSIS — R93.2 ABNORMAL LIVER DIAGNOSTIC IMAGING: ICD-10-CM

## 2025-02-10 DIAGNOSIS — F10.21 HISTORY OF ALCOHOLISM (MULTI): ICD-10-CM

## 2025-02-10 DIAGNOSIS — R79.89 ELEVATED LFTS: ICD-10-CM

## 2025-02-10 PROCEDURE — 91200 LIVER ELASTOGRAPHY: CPT | Performed by: INTERNAL MEDICINE

## 2025-02-14 ENCOUNTER — OFFICE VISIT (OUTPATIENT)
Dept: OBSTETRICS AND GYNECOLOGY | Facility: CLINIC | Age: 65
End: 2025-02-14
Payer: COMMERCIAL

## 2025-02-14 VITALS — DIASTOLIC BLOOD PRESSURE: 86 MMHG | SYSTOLIC BLOOD PRESSURE: 160 MMHG

## 2025-02-14 DIAGNOSIS — N39.41 URGE INCONTINENCE: Primary | ICD-10-CM

## 2025-02-14 PROCEDURE — 3077F SYST BP >= 140 MM HG: CPT | Performed by: OBSTETRICS & GYNECOLOGY

## 2025-02-14 PROCEDURE — 1123F ACP DISCUSS/DSCN MKR DOCD: CPT | Performed by: OBSTETRICS & GYNECOLOGY

## 2025-02-14 PROCEDURE — 99214 OFFICE O/P EST MOD 30 MIN: CPT | Performed by: OBSTETRICS & GYNECOLOGY

## 2025-02-14 PROCEDURE — 3079F DIAST BP 80-89 MM HG: CPT | Performed by: OBSTETRICS & GYNECOLOGY

## 2025-02-14 ASSESSMENT — PATIENT HEALTH QUESTIONNAIRE - PHQ9
SUM OF ALL RESPONSES TO PHQ9 QUESTIONS 1 & 2: 0
1. LITTLE INTEREST OR PLEASURE IN DOING THINGS: NOT AT ALL
2. FEELING DOWN, DEPRESSED OR HOPELESS: NOT AT ALL

## 2025-02-14 NOTE — PROGRESS NOTES
GYN PROGRESS NOTE          Chief complaint: follow up    HPI:  Patient answers are not available for this visit.  HPI    Antonieta is a 65 yo Est patient here today for a follow up after pessary ring placement on 2/3/25. Pt reports no improvement, still has frequency and urgency incontinence.  Last edited by Ca Timmons RN on 2/14/2025 11:26 AM.          Reviewed case with patient, reviewed plans.    Myrbetriq cost too much patient currently has severe dry mouth does not want anticholinergic medications  ROS:  GEN - no fevers or chills  RESP - no SOB or cough  GYN - see HPI      HISTORY:  Past Medical History:   Diagnosis Date    Abnormal mammogram 06/2022    left cat 5--invasive ductal carcinoma    Alcoholic gastritis without bleeding     Cirrhosis (Multi)     Depression     Esophageal varices in alcoholic cirrhosis (Multi)     History of mammogram 08/03/2024    cat 2    Hx of breast cancer     Hyperlipidemia     Hypertension     Incontinence of urine in female     Pap test, as part of routine gynecological examination 01/2015    wnl, hpv neg    Use of cane as ambulatory aid     Wears dentures     Wears glasses      Past Surgical History:   Procedure Laterality Date    ADENOIDECTOMY      BARIATRIC SURGERY  2000    BREAST LUMPECTOMY Left     CHOLECYSTECTOMY  2010    COLONOSCOPY      TONSILLECTOMY  1964    with adenoidectomy    TOTAL HIP ARTHROPLASTY Left 2009    UPPER GASTROINTESTINAL ENDOSCOPY       Social History     Socioeconomic History    Marital status:      Spouse name: Not on file    Number of children: Not on file    Years of education: Not on file    Highest education level: Not on file   Occupational History    Not on file   Tobacco Use    Smoking status: Every Day     Current packs/day: 0.50     Types: Cigarettes    Smokeless tobacco: Never   Vaping Use    Vaping status: Never Used   Substance and Sexual Activity    Alcohol use: Not Currently     Comment: h/o abuse, states sober for 9 years     Drug use: Not Currently     Types: Marijuana, Methamphetamines     Comment: NOT CURRENTLY NOLAN CYR CMA    Sexual activity: Not Currently     Birth control/protection: Post-menopausal   Other Topics Concern    Not on file   Social History Narrative    Not on file     Social Drivers of Health     Financial Resource Strain: Low Risk  (12/30/2024)    Received from [facility]    Overall Financial Resource Strain (CARDIA)     Difficulty of Paying Living Expenses: Not hard at all   Recent Concern: Financial Resource Strain - High Risk (12/9/2024)    Overall Financial Resource Strain (CARDIA)     Difficulty of Paying Living Expenses: Very hard   Food Insecurity: No Food Insecurity (12/30/2024)    Received from [facility]    Hunger Vital Sign     Worried About Running Out of Food in the Last Year: Never true     Ran Out of Food in the Last Year: Never true   Transportation Needs: Unknown (12/30/2024)    Received from [facility]    PRAPARE - Transportation     Lack of Transportation (Medical): Not on file     Lack of Transportation (Non-Medical): No   Recent Concern: Transportation Needs - Unmet Transportation Needs (12/9/2024)    PRAPARE - Transportation     Lack of Transportation (Medical): Yes     Lack of Transportation (Non-Medical): Yes   Physical Activity: Inactive (12/30/2024)    Received from [facility]    Exercise Vital Sign     Days of Exercise per Week: 0 days     Minutes of Exercise per Session: 0 min   Stress: Not on file   Social Connections: Not on file   Intimate Partner Violence: Not At Risk (12/10/2024)    Humiliation, Afraid, Rape, and Kick questionnaire     Fear of Current or Ex-Partner: No     Emotionally Abused: No     Physically Abused: No     Sexually Abused: No   Housing Stability: Unknown (12/30/2024)    Received from [facility]    Housing Stability Vital Sign     Unable to Pay for  Housing in the Last Year: Not on file     Number of Times Moved in the Last Year: Not on file     Homeless in the Last Year: No   Recent Concern: Housing Stability - High Risk (12/9/2024)    Housing Stability Vital Sign     Unable to Pay for Housing in the Last Year: Yes     Number of Times Moved in the Last Year: 0     Homeless in the Last Year: No     Cancer-related family history includes Cancer in her father and mother.       PHYSICAL EXAM:  /86   GEN:  A&O, NAD  HEENT:  head HC/AT, no visible goiter  PSYCH:  normal affect, non-anxious      IMPRESSION/PLAN:  64-year-old #3 ring with support pessary in place severe urinary urgency financial toxicity associated with Myrbetriq    New start intradetrusor Botox  Unclear if pessary is useful may take out at some point        Ric Drake MD

## 2025-02-19 ENCOUNTER — ANESTHESIA EVENT (OUTPATIENT)
Dept: GASTROENTEROLOGY | Facility: HOSPITAL | Age: 65
End: 2025-02-19
Payer: COMMERCIAL

## 2025-02-19 ENCOUNTER — HOSPITAL ENCOUNTER (OUTPATIENT)
Dept: GASTROENTEROLOGY | Facility: HOSPITAL | Age: 65
Discharge: HOME | End: 2025-02-19
Payer: COMMERCIAL

## 2025-02-19 ENCOUNTER — ANESTHESIA (OUTPATIENT)
Dept: GASTROENTEROLOGY | Facility: HOSPITAL | Age: 65
End: 2025-02-19
Payer: COMMERCIAL

## 2025-02-19 VITALS
HEIGHT: 57 IN | WEIGHT: 129.41 LBS | RESPIRATION RATE: 16 BRPM | BODY MASS INDEX: 27.92 KG/M2 | OXYGEN SATURATION: 98 % | SYSTOLIC BLOOD PRESSURE: 159 MMHG | TEMPERATURE: 96.8 F | HEART RATE: 80 BPM | DIASTOLIC BLOOD PRESSURE: 88 MMHG

## 2025-02-19 DIAGNOSIS — R11.2 NAUSEA AND VOMITING, UNSPECIFIED VOMITING TYPE: ICD-10-CM

## 2025-02-19 DIAGNOSIS — K70.30 ALCOHOLIC CIRRHOSIS OF LIVER WITHOUT ASCITES (MULTI): ICD-10-CM

## 2025-02-19 DIAGNOSIS — Z98.84 HISTORY OF GASTRIC RESTRICTIVE SURGERY: ICD-10-CM

## 2025-02-19 PROCEDURE — 2500000004 HC RX 250 GENERAL PHARMACY W/ HCPCS (ALT 636 FOR OP/ED): Performed by: NURSE ANESTHETIST, CERTIFIED REGISTERED

## 2025-02-19 PROCEDURE — 7100000010 HC PHASE TWO TIME - EACH INCREMENTAL 1 MINUTE

## 2025-02-19 PROCEDURE — 43235 EGD DIAGNOSTIC BRUSH WASH: CPT | Performed by: INTERNAL MEDICINE

## 2025-02-19 PROCEDURE — 3700000002 HC GENERAL ANESTHESIA TIME - EACH INCREMENTAL 1 MINUTE

## 2025-02-19 PROCEDURE — 7100000009 HC PHASE TWO TIME - INITIAL BASE CHARGE

## 2025-02-19 PROCEDURE — 3700000001 HC GENERAL ANESTHESIA TIME - INITIAL BASE CHARGE

## 2025-02-19 RX ORDER — PROPOFOL 10 MG/ML
INJECTION, EMULSION INTRAVENOUS AS NEEDED
Status: DISCONTINUED | OUTPATIENT
Start: 2025-02-19 | End: 2025-02-19

## 2025-02-19 RX ORDER — HYDROCHLOROTHIAZIDE 12.5 MG/1
12.5 TABLET ORAL DAILY
COMMUNITY

## 2025-02-19 RX ORDER — LIDOCAINE HYDROCHLORIDE 20 MG/ML
INJECTION, SOLUTION INFILTRATION; PERINEURAL AS NEEDED
Status: DISCONTINUED | OUTPATIENT
Start: 2025-02-19 | End: 2025-02-19

## 2025-02-19 RX ADMIN — PROPOFOL 50 MG: 10 INJECTION, EMULSION INTRAVENOUS at 10:31

## 2025-02-19 RX ADMIN — PROPOFOL 50 MG: 10 INJECTION, EMULSION INTRAVENOUS at 10:29

## 2025-02-19 RX ADMIN — LIDOCAINE HYDROCHLORIDE 60 MG: 20 INJECTION, SOLUTION INFILTRATION; PERINEURAL at 10:25

## 2025-02-19 RX ADMIN — PROPOFOL 100 MG: 10 INJECTION, EMULSION INTRAVENOUS at 10:25

## 2025-02-19 SDOH — HEALTH STABILITY: MENTAL HEALTH: CURRENT SMOKER: 1

## 2025-02-19 ASSESSMENT — PAIN SCALES - GENERAL
PAINLEVEL_OUTOF10: 0 - NO PAIN
PAINLEVEL_OUTOF10: 0 - NO PAIN
PAIN_LEVEL: 0
PAINLEVEL_OUTOF10: 0 - NO PAIN

## 2025-02-19 ASSESSMENT — COLUMBIA-SUICIDE SEVERITY RATING SCALE - C-SSRS
2. HAVE YOU ACTUALLY HAD ANY THOUGHTS OF KILLING YOURSELF?: NO
6. HAVE YOU EVER DONE ANYTHING, STARTED TO DO ANYTHING, OR PREPARED TO DO ANYTHING TO END YOUR LIFE?: NO
1. IN THE PAST MONTH, HAVE YOU WISHED YOU WERE DEAD OR WISHED YOU COULD GO TO SLEEP AND NOT WAKE UP?: NO

## 2025-02-19 ASSESSMENT — PAIN - FUNCTIONAL ASSESSMENT
PAIN_FUNCTIONAL_ASSESSMENT: 0-10

## 2025-02-19 NOTE — H&P
Outpatient Hospital Procedure H&P    Patient Profile-Procedures  Initial Info  Patient Demographics  Name Antonieta Boswell  Date of Birth 1960  MRN [id number]  Address   [address] [id number] [facility] [place] [address]    Primary Phone Number [phone]  Secondary Phone Number    PCP Carole Davis    Procedure(s):  EGD  Primary contact name and number   Extended Emergency Contact Information  Primary Emergency Contact: BETINA BOSWELL  Home Phone: [phone]  Work Phone: [phone]  Mobile Phone: [phone]  Relation: Daughter  Preferred language: English   needed? No    General Health  Weight   Vitals:    02/19/25 0925   Weight: 58.7 kg (129 lb 6.6 oz)     BMI Body mass index is 28 kg/m².    Allergies  No Known Allergies    Past Medical History   Past Medical History:   Diagnosis Date    Abnormal mammogram 06/2022    left cat 5--invasive ductal carcinoma    Alcoholic gastritis without bleeding     Cirrhosis (Multi)     Depression     Esophageal varices in alcoholic cirrhosis (Multi)     History of mammogram 08/03/2024    cat 2    Hx of breast cancer     Hyperlipidemia     Hypertension     Incontinence of urine in female     Pap test, as part of routine gynecological examination 01/2015    wnl, hpv neg    Use of cane as ambulatory aid     Wears dentures     Wears glasses        Provider assessment  Diagnosis: h/o PUD, ?cirrhosis    Medication Reviewed - yes  Prior to Admission medications    Medication Sig Start Date End Date Taking? Authorizing Provider   cyclobenzaprine (Flexeril) 5 mg tablet Take 1 tablet (5 mg) by mouth 3 times a day as needed for muscle spasms. 12/11/24  Yes Shawn Galo MD   hydroCHLOROthiazide (Microzide) 12.5 mg tablet Take 1 tablet (12.5 mg) by mouth once daily.   Yes Historical Provider, MD   multivitamin tablet Take 1 tablet by mouth once daily.   Yes Historical Provider, MD   pantoprazole (ProtoNix) 40 mg EC tablet Take 1 tablet  (40 mg) by mouth once daily in the morning. Take before meals. Do not crush, chew, or split. 1/21/25 1/21/26 Yes DOMINGO Mueller   spironolactone (Aldactone) 50 mg tablet Take 1 tablet (50 mg) by mouth once daily. 1/21/25 1/21/26 Yes DOMINGO Mueller   mirabegron (Mybetriq) 25 mg tablet extended release 24 hr 24 hr tablet Take 1 tablet (25 mg) by mouth once daily.  Patient not taking: Reported on 2/19/2025 2/3/25 3/5/25  Ric Drake MD   oxyCODONE (Roxicodone) 5 mg immediate release tablet Take 1 tablet (5 mg) by mouth every 6 hours if needed for severe pain (7 - 10).  Patient not taking: No sig reported 12/11/24   Shawn Galo MD       Physical Exam  Vitals:    02/19/25 0925   BP: 147/82   Pulse: 78   Resp: 18   Temp: 36.6 °C (97.9 °F)   SpO2: 98%        General: A&Ox3, NAD.  CV: RRR. No murmur.  Resp: CTA bilaterally. No wheezing, rhonchi or rales.   Extrem: No edema.       Oropharyngeal Classification II (hard and soft palate, upper portion of tonsils and uvula visible)  ASA PS Classification 3  Sedation Plan Deep  Procedure Plan - pre-procedural (re)assesment completed by physician:  discharge/transfer patient when discharge criteria met    Jayy Huynh MD  2/19/2025 10:14 AM

## 2025-02-19 NOTE — DISCHARGE INSTRUCTIONS

## 2025-02-19 NOTE — ANESTHESIA POSTPROCEDURE EVALUATION
Patient: Antonieta Boswell    Procedure Summary       Date: 02/19/25 Room / Location: [facility]    Anesthesia Start: 1020 Anesthesia Stop:     Procedure: EGD Diagnosis:       History of gastric restrictive surgery      Nausea and vomiting, unspecified vomiting type      Alcoholic cirrhosis of liver without ascites (Multi)    Scheduled Providers: Jayy Huynh MD; Peri Nolen MD; Donita Beckwith RN; Smitha Guadrado Responsible Provider: Peri Nolen MD    Anesthesia Type: MAC ASA Status: 3            Anesthesia Type: MAC    Vitals Value Taken Time   /84 02/19/25 1034   Temp - 02/19/25 1034   Pulse 85 02/19/25 1034   Resp 16 02/19/25 1034   SpO2 98 02/19/25 1034       Anesthesia Post Evaluation    Patient location during evaluation: PACU  Patient participation: complete - patient participated  Level of consciousness: awake and alert  Pain score: 0  Pain management: adequate  Airway patency: patent  Cardiovascular status: acceptable  Respiratory status: acceptable  Hydration status: acceptable  Postoperative Nausea and Vomiting: none        No notable events documented.

## 2025-02-19 NOTE — Clinical Note
Patient tolerated procedure well. Appears comfortable with no complaints of pain. VS stable. Arousable prior to transport. Patient transported to [facility] via cart.  Report called              . Handoff completed

## 2025-02-19 NOTE — Clinical Note
Huddle and Timeout completed together with team. Patient wristband and KENN information verified.  Anesthesia safety check completed

## 2025-02-19 NOTE — ANESTHESIA PREPROCEDURE EVALUATION
Patient: Antonieta Boswell    Procedure Information       Date/Time: 02/19/25 1030    Scheduled providers: Jayy Huynh MD; Peri Nolen MD; Donita Beckwith RN; Smitha Guardado    Procedure: EGD    Location: [facility]            Relevant Problems   Cardiac   (+) Essential hypertension   (+) Hyperlipidemia, unspecified      Pulmonary   (+) COPD (chronic obstructive pulmonary disease) (Multi)      Neuro   (+) Depression, recurrent (CMS-HCC)      GYN   (+) Malignant neoplasm of upper-outer quadrant of left female breast       Clinical information reviewed:   Tobacco  Allergies  Meds  Problems  Med Hx  Surg Hx  OB Status    Fam Hx  Soc Hx        NPO Detail:  NPO/Void Status  Carbohydrate Drink Given Prior to Surgery? : N  Date of Last Liquid: 02/18/25  Time of Last Liquid: 2359  Date of Last Solid: 02/18/25  Time of Last Solid: 2330  Last Intake Type: Clear fluids; Food  Time of Last Void: 0932         Physical Exam    Airway  Mallampati: II  TM distance: >3 FB  Neck ROM: full     Cardiovascular - normal exam     Dental    Pulmonary - normal exam     Abdominal - normal exam             Anesthesia Plan    History of general anesthesia?: yes  History of complications of general anesthesia?: no    ASA 3     MAC     The patient is a current smoker.    intravenous induction   Anesthetic plan and risks discussed with patient.    Plan discussed with attending and CRNA.

## 2025-03-06 ENCOUNTER — APPOINTMENT (OUTPATIENT)
Dept: OBSTETRICS AND GYNECOLOGY | Facility: CLINIC | Age: 65
End: 2025-03-06
Payer: COMMERCIAL

## 2025-03-18 ENCOUNTER — APPOINTMENT (OUTPATIENT)
Dept: GASTROENTEROLOGY | Facility: CLINIC | Age: 65
End: 2025-03-18
Payer: COMMERCIAL

## 2025-03-19 ENCOUNTER — APPOINTMENT (OUTPATIENT)
Dept: PRIMARY CARE | Facility: CLINIC | Age: 65
End: 2025-03-19
Payer: MEDICARE

## 2025-03-20 ENCOUNTER — APPOINTMENT (OUTPATIENT)
Dept: PHARMACY | Facility: HOSPITAL | Age: 65
End: 2025-03-20
Payer: COMMERCIAL

## 2025-03-26 DIAGNOSIS — N39.41 URGE INCONTINENCE: ICD-10-CM

## 2025-03-26 DIAGNOSIS — R32 URINARY INCONTINENCE, UNSPECIFIED TYPE: ICD-10-CM

## 2025-03-26 DIAGNOSIS — S32.512A CLOSED FRACTURE OF SUPERIOR RAMUS OF LEFT PUBIS, INITIAL ENCOUNTER (MULTI): ICD-10-CM

## 2025-03-26 RX ORDER — ONABOTULINUMTOXINA 100 [USP'U]/1
100 INJECTION, POWDER, LYOPHILIZED, FOR SOLUTION INTRADERMAL; INTRAMUSCULAR ONCE
Qty: 1 EACH | Refills: 4 | Status: SHIPPED | OUTPATIENT
Start: 2025-03-26 | End: 2025-04-02 | Stop reason: SDUPTHER

## 2025-04-01 ENCOUNTER — APPOINTMENT (OUTPATIENT)
Dept: PHARMACY | Facility: HOSPITAL | Age: 65
End: 2025-04-01
Payer: COMMERCIAL

## 2025-04-02 DIAGNOSIS — N39.41 URGE INCONTINENCE: ICD-10-CM

## 2025-04-02 DIAGNOSIS — R32 URINARY INCONTINENCE, UNSPECIFIED TYPE: ICD-10-CM

## 2025-04-02 RX ORDER — ONABOTULINUMTOXINA 100 [USP'U]/1
100 INJECTION, POWDER, LYOPHILIZED, FOR SOLUTION INTRADERMAL; INTRAMUSCULAR ONCE
Qty: 1 EACH | Refills: 4 | Status: SHIPPED | OUTPATIENT
Start: 2025-04-02 | End: 2025-04-02

## 2025-04-09 ENCOUNTER — TELEMEDICINE (OUTPATIENT)
Dept: PHARMACY | Facility: HOSPITAL | Age: 65
End: 2025-04-09
Payer: COMMERCIAL

## 2025-04-09 DIAGNOSIS — S32.512A CLOSED FRACTURE OF SUPERIOR RAMUS OF LEFT PUBIS, INITIAL ENCOUNTER (MULTI): ICD-10-CM

## 2025-04-09 NOTE — ASSESSMENT & PLAN NOTE
Patient has been diagnosed with osteoporosis in 12.2024.   Rationale for plan: Will plan to continue calcium/vitamin D supplementation. Discussed oral options (bisphosphonate) and Prolia. Patient has history of gastric bypass therefore bisphosphonate may not be most beneficial option. Patient reports cost is a concern as patient does not have Medicare Part D insurance.     Will plan for patient to continue oral calcium/vitamin D supplementation and have patient complete upcoming labs for PCP visit in August 2025. Then depending on lab results, will plan to start other therapies.    Medication Changes:  CONTINUE  Calcium 1200 mg/day  Vitamin D 2000 IU /day     Monitoring and Education:  Counseled patient on MOA, expectations, side effects, duration of therapy, contraindications, administration, and monitoring parameters  Answered all patient questions and concerns; provided [facility] phone number if issues/questions arise

## 2025-04-09 NOTE — PROGRESS NOTES
Clinical Pharmacy Appointment    Patient ID: Antonieta Boswell is a 64 y.o. female who presents for Osteoporosis.    Pt is here for Follow Up appointment.     Referring Provider: Carole Davis, *  PCP: Carole Davis MD   Last visit with PCP: 11.19.2024   Next visit with PCP: 8.6.2025    Subjective   HPI  OSTEOPOROSIS  Does patient follow with Rheumatology: No  Date of diagnosis: 12/2024    Osteoporosis risk factors (non-modifiable)   race, female sex, and personal history of fracture as an adult     Osteoporosis risk factors (potentially modifiable):  alcoholism, low body weight, low calcium intake, and smoking history    Current Pharmacotherapy:  Patient sometimes takes calcium and vitamin D    Clarifications to above regimen: N/a   Adverse Effects: N/a     Past Pharmacotherapy:  N/a    Additional Information:  Vitamin D: Occasionally  Calcium: Occasionally  Planned Dental Procedures: No planned procedures  Patient reports does not have any teeth    Relavent Past Medical history:  Co-morbid Conditions:  History of left hip replacement  Osteoarthritis  Fracture of the left superior and left inferior pubic ramus - 12/9/2024  History of alcoholism, methamphetamine use  Cirrhosis  History of gastric bypass  Any changes to home medications, OTCs, supplements? No  Any new allergies? No  Any new medical conditions? No    DEXA Bone Density Scan  === 12/09/24 ===  DEXA BONE DENSITY    - Impression -  DEXA:  According to World Health Organization criteria, classification is osteoporosis.    Follow-up recommended in two years or sooner as clinically warranted.    All images and detailed analysis are available on the  Radiology  PACS.    MACRO:  None    Drug Interactions  No relevant drug interactions were noted.    Objective   No Known Allergies    Social History     Social History Narrative    Not on file      Medication Review  Current Outpatient Medications   Medication Instructions     "cyclobenzaprine (FLEXERIL) 5 mg, oral, 3 times daily PRN    hydroCHLOROthiazide (MICROZIDE) 12.5 mg, Daily    multivitamin tablet 1 tablet, Daily    oxyCODONE (ROXICODONE) 5 mg, oral, Every 6 hours PRN    pantoprazole (PROTONIX) 40 mg, oral, Daily before breakfast, Do not crush, chew, or split.    spironolactone (ALDACTONE) 50 mg, oral, Daily      Vitals  BP Readings from Last 2 Encounters:   02/19/25 159/88   02/14/25 160/86     BMI Readings from Last 1 Encounters:   02/19/25 28.00 kg/m²      Labs  A1C  Lab Results   Component Value Date    HGBA1C 5.1 08/29/2023    HGBA1C 5.5 05/03/2022    HGBA1C 5.6 12/04/2020     BMP  Lab Results   Component Value Date    CALCIUM 9.4 01/24/2025     01/24/2025    K 4.1 01/24/2025    CO2 29 01/24/2025     (H) 01/24/2025    BUN 24 (H) 01/24/2025    CREATININE 0.79 01/24/2025    EGFR 83 01/24/2025     LFTs  Lab Results   Component Value Date    ALT 44 01/24/2025    AST 57 (H) 01/24/2025    ALKPHOS 127 01/24/2025    BILITOT 0.3 01/24/2025     FLP  Lab Results   Component Value Date    TRIG 75 08/29/2023    CHOL 162 08/29/2023    LDLF 65 08/29/2023    HDL 82.3 08/29/2023     Urine Microalbumin  No results found for: \"MICROALBCREA\"  Weight Management  Wt Readings from Last 3 Encounters:   02/19/25 58.7 kg (129 lb 6.6 oz)   02/05/25 57.5 kg (126 lb 12.2 oz)   02/03/25 58.1 kg (128 lb)      There is no height or weight on file to calculate BMI.       Assessment/Plan   Problem List Items Addressed This Visit       Closed fracture of superior ramus of left pubis, initial encounter (Multi)     Patient has been diagnosed with osteoporosis in 12.2024.   Rationale for plan: Will plan to continue calcium/vitamin D supplementation. Discussed oral options (bisphosphonate) and Prolia. Patient has history of gastric bypass therefore bisphosphonate may not be most beneficial option. Patient reports cost is a concern as patient does not have Medicare Part D insurance.     Will plan for " patient to continue oral calcium/vitamin D supplementation and have patient complete upcoming labs for PCP visit in August 2025. Then depending on lab results, will plan to start other therapies.    Medication Changes:  CONTINUE  Calcium 1200 mg/day  Vitamin D 2000 IU /day     Monitoring and Education:  Counseled patient on MOA, expectations, side effects, duration of therapy, contraindications, administration, and monitoring parameters  Answered all patient questions and concerns; provided [facility] phone number if issues/questions arise          Clinical Pharmacist follow-up: As needed; please re-refer if additional assistance is needed    Continue all meds under the continuation of care with the referring provider and clinical pharmacy team.    Thank you,  Anais Manzano, PharmD  Clinical Pharmacist  [phone]    Verbal consent to manage patient's drug therapy was obtained from the patient. They were informed they may decline to participate or withdraw from participation in pharmacy services at any time.

## 2025-04-10 ENCOUNTER — APPOINTMENT (OUTPATIENT)
Dept: GASTROENTEROLOGY | Facility: CLINIC | Age: 65
End: 2025-04-10
Payer: COMMERCIAL

## 2025-04-15 ENCOUNTER — APPOINTMENT (OUTPATIENT)
Dept: GASTROENTEROLOGY | Facility: CLINIC | Age: 65
End: 2025-04-15
Payer: COMMERCIAL

## 2025-04-17 DIAGNOSIS — N39.41 URGE INCONTINENCE: ICD-10-CM

## 2025-04-18 RX ORDER — ONABOTULINUMTOXINA 100 [USP'U]/1
100 INJECTION, POWDER, LYOPHILIZED, FOR SOLUTION INTRADERMAL; INTRAMUSCULAR ONCE
Qty: 1 EACH | Refills: 0 | Status: SHIPPED | OUTPATIENT
Start: 2025-04-18 | End: 2025-04-18

## 2025-04-25 ENCOUNTER — TELEPHONE (OUTPATIENT)
Dept: OBSTETRICS AND GYNECOLOGY | Facility: CLINIC | Age: 65
End: 2025-04-25
Payer: COMMERCIAL

## 2025-04-29 ENCOUNTER — APPOINTMENT (OUTPATIENT)
Dept: GASTROENTEROLOGY | Facility: CLINIC | Age: 65
End: 2025-04-29
Payer: COMMERCIAL

## 2025-05-07 ENCOUNTER — TELEPHONE (OUTPATIENT)
Dept: PRIMARY CARE | Facility: CLINIC | Age: 65
End: 2025-05-07
Payer: COMMERCIAL

## 2025-05-07 NOTE — TELEPHONE ENCOUNTER
Patient phones the office today w/ request for a new order for In Home Physical Therapy for her broken Hip/Pelvis on the Left Side as she cannot afford the copay that is due each time she goes.     Please advise and contact patient for any additional questions/concerns at [phone].     Thank you.        No

## 2025-05-09 ENCOUNTER — DOCUMENTATION (OUTPATIENT)
Dept: HOME HEALTH SERVICES | Facility: HOME HEALTH | Age: 65
End: 2025-05-09
Payer: COMMERCIAL

## 2025-05-09 ENCOUNTER — TELEPHONE (OUTPATIENT)
Dept: HOME HEALTH SERVICES | Facility: HOME HEALTH | Age: 65
End: 2025-05-09
Payer: COMMERCIAL

## 2025-05-09 DIAGNOSIS — S72.102S CLOSED FRACTURE OF TROCHANTER OF LEFT FEMUR, SEQUELA: ICD-10-CM

## 2025-05-09 DIAGNOSIS — S72.002D CLOSED FRACTURE OF LEFT HIP WITH ROUTINE HEALING, SUBSEQUENT ENCOUNTER: ICD-10-CM

## 2025-05-09 NOTE — HH CARE COORDINATION
This referral has been made a Non Admit with  Home Care due to Insufficient Documentation. F2F older than 90 days.  If you have further questions, feel free to reach out to our office at [phone]. Thank you, [facility] Intake.    Provider Notification:  Sent In Basket to PCP Carole Davis MD informing that [facility] has not accepted patient referral.

## 2025-05-09 NOTE — TELEPHONE ENCOUNTER
Humphrey Lam,  After reviewing the referral, the Patient's Face-to-Face is outside 90 days which is required by [facility] for home care coverage. Please resubmit your referral once the patient has had an in-person or virtual/video Telehealth appointment. Feel free to call [phone] with any questions.     -Mau Tomas LPN (intake)

## 2025-05-12 ENCOUNTER — APPOINTMENT (OUTPATIENT)
Dept: OBSTETRICS AND GYNECOLOGY | Facility: CLINIC | Age: 65
End: 2025-05-12
Payer: COMMERCIAL

## 2025-05-12 VITALS — WEIGHT: 129 LBS | SYSTOLIC BLOOD PRESSURE: 161 MMHG | DIASTOLIC BLOOD PRESSURE: 72 MMHG | BODY MASS INDEX: 27.92 KG/M2

## 2025-05-12 DIAGNOSIS — N39.41 URGE INCONTINENCE OF URINE: Primary | ICD-10-CM

## 2025-05-12 LAB
POC APPEARANCE, URINE: CLEAR
POC BILIRUBIN, URINE: ABNORMAL
POC BLOOD, URINE: ABNORMAL
POC COLOR, URINE: YELLOW
POC GLUCOSE, URINE: NEGATIVE MG/DL
POC KETONES, URINE: NEGATIVE MG/DL
POC LEUKOCYTES, URINE: NEGATIVE
POC NITRITE,URINE: NEGATIVE
POC PH, URINE: 5.5 PH
POC PROTEIN, URINE: ABNORMAL MG/DL
POC SPECIFIC GRAVITY, URINE: >=1.03
POC UROBILINOGEN, URINE: 1 EU/DL

## 2025-05-12 PROCEDURE — 52287 CYSTOSCOPY CHEMODENERVATION: CPT | Performed by: OBSTETRICS & GYNECOLOGY

## 2025-05-12 PROCEDURE — 81003 URINALYSIS AUTO W/O SCOPE: CPT | Performed by: OBSTETRICS & GYNECOLOGY

## 2025-05-12 RX ORDER — DOXYCYCLINE HYCLATE 100 MG
100 TABLET ORAL ONCE
Status: COMPLETED | OUTPATIENT
Start: 2025-05-12 | End: 2025-05-12

## 2025-05-12 RX ORDER — LIDOCAINE HYDROCHLORIDE 20 MG/ML
1 JELLY TOPICAL ONCE
Status: COMPLETED | OUTPATIENT
Start: 2025-05-12 | End: 2025-05-12

## 2025-05-12 RX ADMIN — LIDOCAINE HYDROCHLORIDE 1 APPLICATION: 20 JELLY TOPICAL at 14:46

## 2025-05-12 RX ADMIN — Medication 100 MG: at 14:45

## 2025-05-12 NOTE — PROGRESS NOTES
GYN PROGRESS NOTE          Chief complaint: Urge incontinence    HPI:  Patient answers are not available for this visit.  HPI       Procedure     Additional comments: Cystoscopy with Intradetrusor 100 units Botox               Comments     Antonieta is a 64 EST patient here today for a Cystoscopy with Intradetrusor Botox.  Her last visit was 2/14/25   Today, Pt complains of incontinence, urgency and frequency  BP elevated today, pt said she had to walk a long way in the parking lot and its hot out. Will re-check.   The procedure was explained by RN and Dr. Drake  Consent was reviewed with, and signed by Pt.  Pt denies questions or concerns.     Doxycyline 100mg PO was given per office protocol.  See MAR    Time out was completed.  Botox 100 Units patient supplied  I/O UA obtained see results.   LOT: [id number]  EXP: 10/31/2027    Chaperoned by SHANNON Espinal                 Last edited by Ca Timmons RN on 5/12/2025  2:26 PM.            ROS:  GEN - no fevers or chills  RESP - no SOB or cough  GYN - see HPI      HISTORY:  Medical History[1]  Surgical History[2]  Social History     Socioeconomic History    Marital status:      Spouse name: Not on file    Number of children: Not on file    Years of education: Not on file    Highest education level: Not on file   Occupational History    Not on file   Tobacco Use    Smoking status: Every Day     Current packs/day: 0.50     Types: Cigarettes    Smokeless tobacco: Never   Vaping Use    Vaping status: Never Used   Substance and Sexual Activity    Alcohol use: Not Currently     Comment: h/o abuse, states sober for 9 years    Drug use: Not Currently     Types: Marijuana, Methamphetamines     Comment: NOT CURRENTLY NOLAN CYR CMA    Sexual activity: Not Currently     Birth control/protection: Post-menopausal   Other Topics Concern    Not on file   Social History Narrative    Not on file     Social Drivers of Health     Financial Resource Strain: Low Risk   (12/30/2024)    Received from [facility]    Overall Financial Resource Strain (CARDIA)     Difficulty of Paying Living Expenses: Not hard at all   Recent Concern: Financial Resource Strain - High Risk (12/9/2024)    Overall Financial Resource Strain (CARDIA)     Difficulty of Paying Living Expenses: Very hard   Food Insecurity: No Food Insecurity (12/30/2024)    Received from [facility]    Hunger Vital Sign     Worried About Running Out of Food in the Last Year: Never true     Ran Out of Food in the Last Year: Never true   Transportation Needs: Unknown (12/30/2024)    Received from [facility]    PRAPARE - Transportation     Lack of Transportation (Medical): Not on file     Lack of Transportation (Non-Medical): No   Recent Concern: Transportation Needs - Unmet Transportation Needs (12/9/2024)    PRAPARE - Transportation     Lack of Transportation (Medical): Yes     Lack of Transportation (Non-Medical): Yes   Physical Activity: Inactive (12/30/2024)    Received from [facility]    Exercise Vital Sign     Days of Exercise per Week: 0 days     Minutes of Exercise per Session: 0 min   Stress: Not on file   Social Connections: Not on file   Intimate Partner Violence: Not At Risk (12/10/2024)    Humiliation, Afraid, Rape, and Kick questionnaire     Fear of Current or Ex-Partner: No     Emotionally Abused: No     Physically Abused: No     Sexually Abused: No   Housing Stability: Unknown (12/30/2024)    Received from [facility]    Housing Stability Vital Sign     Unable to Pay for Housing in the Last Year: Not on file     Number of Times Moved in the Last Year: Not on file     Homeless in the Last Year: No   Recent Concern: Housing Stability - High Risk (12/9/2024)    Housing Stability Vital Sign     Unable to Pay for Housing in the Last Year: Yes     Number of Times Moved in the Last Year: 0     Homeless in the  Last Year: No     Cancer-related family history includes Cancer in her father and mother.       PHYSICAL EXAM:  /72   Wt 58.5 kg (129 lb)   BMI 27.92 kg/m²   Physical examination:  General: No distress  Neck: No masses  Respiratory: No respiratory distress  GYN: Normal vulvar skin normal clitoral white and clitoris labia majora and minora normal pink vaginal mucosa  perianal area: without lesions      Operative procedure: Operative cystoscopy intra-detrusor Botox injection  Dx: Urinary urgency    The patient was taken to the procedure room after informed consent was obtained.  Timeout was performed, birth date, patient name and procedure all discussed with the patient. She was placed in lithotomy position, half blade speculum placed in the posterior vagina to expose the urethra.  The urethra was prepped with Betadine.  1% lidocaine jelly was introduced to the urethral orifice. operative cystoscopy was placed inside the bladder surfaces were surveyed bilateral reflux was noted from the ureter orifices. A urethral inspection was then performed upon removal of the cystoscope from the bladder.  The cystoscope was reintroduced with the needle and in a fan like projection 0.5ml aliquots ofdiluted Botox were introduced into the bladder muscle. Total Botox use was 100U in approximately 10ml saline. Scant bleeding was noted from the urothelial tissue.  The scope was removed from the patient's and the speculum was removed the patient tolerated the procedure well no complications bleeding minimal patient voiced understanding about postprocedure care.    IMPRESSION/PLAN:    Status post intradetrusor Botox    Plan follow-up in 6 months for repeat procedure        Ric Drake MD         [1]   Past Medical History:  Diagnosis Date    Abnormal mammogram 06/2022    left cat 5--invasive ductal carcinoma    Alcoholic gastritis without bleeding     Cirrhosis (Multi)     Depression     Esophageal varices in alcoholic  cirrhosis (Multi)     History of mammogram 08/03/2024    cat 2    Hx of breast cancer     Hyperlipidemia     Hypertension     Incontinence of urine in female     Pap test, as part of routine gynecological examination 01/2015    wnl, hpv neg    Use of cane as ambulatory aid     Wears dentures     Wears glasses    [2]   Past Surgical History:  Procedure Laterality Date    ADENOIDECTOMY      BARIATRIC SURGERY  2000    BREAST LUMPECTOMY Left     CHOLECYSTECTOMY  2010    COLONOSCOPY      TONSILLECTOMY  1964    with adenoidectomy    TOTAL HIP ARTHROPLASTY Left 2009    UPPER GASTROINTESTINAL ENDOSCOPY

## 2025-05-12 NOTE — LETTER
Cystoscopy with Injection of Botox     This is a procedure that is intended to treat over activity of the bladder including loss of urine due to bladder spasm. Overall, the procedure is approximately 70% effective.   This procedure is performed by placing a camera called a cystoscope into the bladder and injecting small dilute amounts of Botox into the bladder wall muscle. The procedure can be performed in the OR under light sedation or with local numbing medication or can be done in the office with local numbing medication. If you choose to have it without sedation the most common sensation is a full bladder pressure sensation.       Potential risks or complications of this procedure includes urinary tract infection, and the chance the Botox works too well and causes urinary retention. Only on rare occasions may a ruby catheter be placed to empty the bladder if necessary.       Postoperative restrictions: None       Postoperative follow-up: 10-14 days after procedure to evaluate for efficacy and retention   Scan QR code with your phone for view the procedure

## 2025-06-29 DIAGNOSIS — N39.41 URGE INCONTINENCE OF URINE: Primary | ICD-10-CM

## 2025-07-02 RX ORDER — ONABOTULINUMTOXINA 100 [USP'U]/1
INJECTION, POWDER, LYOPHILIZED, FOR SOLUTION INTRADERMAL; INTRAMUSCULAR
Qty: 1 EACH | Refills: 3 | Status: SHIPPED | OUTPATIENT
Start: 2025-07-02

## 2025-07-21 ENCOUNTER — TELEPHONE (OUTPATIENT)
Dept: GASTROENTEROLOGY | Facility: CLINIC | Age: 65
End: 2025-07-21
Payer: COMMERCIAL

## 2025-07-21 ENCOUNTER — TELEPHONE (OUTPATIENT)
Dept: PRIMARY CARE | Facility: CLINIC | Age: 65
End: 2025-07-21

## 2025-07-21 NOTE — TELEPHONE ENCOUNTER
MOOK on this patient-    Gunjan Oliveira from [facility] called in, has concerns about Antonieta.   She had a telehealth visit with her today, patient has  called their emergency line  a few times lately re: persistent nausea and vomiting.  She prescribed her Zofran.   Wanted to verify if the patient had been seen recently by one of our providers.   Advised that patient had No Showed and cancelled quite a few times.    She will be doing a follow up visit with her in person on 8/7/25 and will update us then.

## 2025-08-06 ENCOUNTER — APPOINTMENT (OUTPATIENT)
Dept: GENERAL RADIOLOGY | Age: 65
End: 2025-08-06
Payer: COMMERCIAL

## 2025-08-06 ENCOUNTER — APPOINTMENT (OUTPATIENT)
Dept: ULTRASOUND IMAGING | Age: 65
End: 2025-08-06
Payer: COMMERCIAL

## 2025-08-06 ENCOUNTER — HOSPITAL ENCOUNTER (EMERGENCY)
Age: 65
Discharge: HOME | End: 2025-08-06
Payer: COMMERCIAL

## 2025-08-06 ENCOUNTER — APPOINTMENT (OUTPATIENT)
Dept: PRIMARY CARE | Facility: CLINIC | Age: 65
End: 2025-08-06
Payer: COMMERCIAL

## 2025-08-06 VITALS
RESPIRATION RATE: 18 BRPM | DIASTOLIC BLOOD PRESSURE: 91 MMHG | HEART RATE: 89 BPM | TEMPERATURE: 98.2 F | OXYGEN SATURATION: 96 % | SYSTOLIC BLOOD PRESSURE: 179 MMHG

## 2025-08-06 DIAGNOSIS — M79.604 RIGHT LEG PAIN: Primary | ICD-10-CM

## 2025-08-06 PROCEDURE — 73630 X-RAY EXAM OF FOOT: CPT

## 2025-08-06 PROCEDURE — 93005 ELECTROCARDIOGRAM TRACING: CPT

## 2025-08-06 PROCEDURE — 71045 X-RAY EXAM CHEST 1 VIEW: CPT

## 2025-08-06 PROCEDURE — 93970 EXTREMITY STUDY: CPT

## 2025-08-06 PROCEDURE — 99284 EMERGENCY DEPT VISIT MOD MDM: CPT

## 2025-08-06 ASSESSMENT — PAIN DESCRIPTION - PAIN TYPE: TYPE: ACUTE PAIN

## 2025-08-06 ASSESSMENT — PAIN DESCRIPTION - ORIENTATION: ORIENTATION: RIGHT

## 2025-08-06 ASSESSMENT — PAIN DESCRIPTION - DESCRIPTORS: DESCRIPTORS: ACHING

## 2025-08-06 ASSESSMENT — PAIN DESCRIPTION - FREQUENCY: FREQUENCY: CONTINUOUS

## 2025-08-06 ASSESSMENT — PAIN SCALES - GENERAL: PAINLEVEL_OUTOF10: 6

## 2025-08-06 ASSESSMENT — PAIN DESCRIPTION - LOCATION: LOCATION: LEG

## 2025-08-06 ASSESSMENT — PAIN DESCRIPTION - ONSET: ONSET: ON-GOING

## 2025-08-07 LAB
EKG ATRIAL RATE: 71 BPM
EKG DIAGNOSIS: NORMAL
EKG P-R INTERVAL: 158 MS
EKG Q-T INTERVAL: 464 MS
EKG QRS DURATION: 146 MS
EKG QTC CALCULATION (BAZETT): 504 MS
EKG R AXIS: -64 DEGREES
EKG T AXIS: 33 DEGREES
EKG VENTRICULAR RATE: 71 BPM

## 2025-08-07 PROCEDURE — 93010 ELECTROCARDIOGRAM REPORT: CPT | Performed by: INTERNAL MEDICINE

## 2025-08-22 ENCOUNTER — HOSPITAL ENCOUNTER (OUTPATIENT)
Dept: RADIOLOGY | Facility: HOSPITAL | Age: 65
Discharge: HOME | End: 2025-08-22
Payer: COMMERCIAL

## 2025-08-22 ENCOUNTER — APPOINTMENT (OUTPATIENT)
Dept: PRIMARY CARE | Facility: CLINIC | Age: 65
End: 2025-08-22
Payer: COMMERCIAL

## 2025-08-22 VITALS
SYSTOLIC BLOOD PRESSURE: 138 MMHG | HEIGHT: 57 IN | WEIGHT: 132 LBS | TEMPERATURE: 97.2 F | RESPIRATION RATE: 16 BRPM | OXYGEN SATURATION: 95 % | BODY MASS INDEX: 28.48 KG/M2 | HEART RATE: 84 BPM | DIASTOLIC BLOOD PRESSURE: 78 MMHG

## 2025-08-22 DIAGNOSIS — F33.9 DEPRESSION, RECURRENT: ICD-10-CM

## 2025-08-22 DIAGNOSIS — I10 ESSENTIAL HYPERTENSION: ICD-10-CM

## 2025-08-22 DIAGNOSIS — C50.412 MALIGNANT NEOPLASM OF UPPER-OUTER QUADRANT OF LEFT FEMALE BREAST, UNSPECIFIED ESTROGEN RECEPTOR STATUS: ICD-10-CM

## 2025-08-22 DIAGNOSIS — F17.210 CIGARETTE NICOTINE DEPENDENCE WITHOUT COMPLICATION: ICD-10-CM

## 2025-08-22 DIAGNOSIS — M40.209 KYPHOSIS, UNSPECIFIED KYPHOSIS TYPE, UNSPECIFIED SPINAL REGION: ICD-10-CM

## 2025-08-22 DIAGNOSIS — Z12.31 ENCOUNTER FOR SCREENING MAMMOGRAM FOR MALIGNANT NEOPLASM OF BREAST: ICD-10-CM

## 2025-08-22 DIAGNOSIS — I24.89: ICD-10-CM

## 2025-08-22 DIAGNOSIS — R73.9 HYPERGLYCEMIA: ICD-10-CM

## 2025-08-22 DIAGNOSIS — S79.911A INJURY OF RIGHT HIP, INITIAL ENCOUNTER: ICD-10-CM

## 2025-08-22 DIAGNOSIS — F15.10 METHAMPHETAMINE USE: ICD-10-CM

## 2025-08-22 DIAGNOSIS — J44.9 CHRONIC OBSTRUCTIVE PULMONARY DISEASE, UNSPECIFIED COPD TYPE (MULTI): ICD-10-CM

## 2025-08-22 DIAGNOSIS — Z12.11 COLON CANCER SCREENING: ICD-10-CM

## 2025-08-22 DIAGNOSIS — Z98.84 HISTORY OF GASTRIC RESTRICTIVE SURGERY: ICD-10-CM

## 2025-08-22 DIAGNOSIS — Z93.1 GASTROSTOMY STATUS (MULTI): ICD-10-CM

## 2025-08-22 DIAGNOSIS — R60.0 LEG EDEMA: ICD-10-CM

## 2025-08-22 DIAGNOSIS — I50.9 HEART FAILURE, UNSPECIFIED HF CHRONICITY, UNSPECIFIED HEART FAILURE TYPE: ICD-10-CM

## 2025-08-22 DIAGNOSIS — E78.5 HYPERLIPIDEMIA, UNSPECIFIED HYPERLIPIDEMIA TYPE: ICD-10-CM

## 2025-08-22 DIAGNOSIS — Z00.00 ENCOUNTER FOR MEDICARE ANNUAL WELLNESS EXAM: Primary | ICD-10-CM

## 2025-08-22 DIAGNOSIS — Z13.9 ENCOUNTER FOR SCREENING INVOLVING SOCIAL DETERMINANTS OF HEALTH (SDOH): ICD-10-CM

## 2025-08-22 DIAGNOSIS — Z13.89 MULTIPHASIC SCREENING: ICD-10-CM

## 2025-08-22 PROCEDURE — G0296 VISIT TO DETERM LDCT ELIG: HCPCS | Performed by: FAMILY MEDICINE

## 2025-08-22 PROCEDURE — 73502 X-RAY EXAM HIP UNI 2-3 VIEWS: CPT | Mod: RT

## 2025-08-22 PROCEDURE — 1170F FXNL STATUS ASSESSED: CPT | Performed by: FAMILY MEDICINE

## 2025-08-22 PROCEDURE — 3008F BODY MASS INDEX DOCD: CPT | Performed by: FAMILY MEDICINE

## 2025-08-22 PROCEDURE — 99397 PER PM REEVAL EST PAT 65+ YR: CPT | Performed by: FAMILY MEDICINE

## 2025-08-22 PROCEDURE — G0136 PR ADMINISTRATION OF A STANDARDIZED, EVIDENCE-BASED SOCIAL DETERMINANTS OF HEALTH RISK ASSESSMENT TOOL, 5 TO 15 MINUTES: HCPCS | Performed by: FAMILY MEDICINE

## 2025-08-22 PROCEDURE — 3078F DIAST BP <80 MM HG: CPT | Performed by: FAMILY MEDICINE

## 2025-08-22 PROCEDURE — G0444 DEPRESSION SCREEN ANNUAL: HCPCS | Performed by: FAMILY MEDICINE

## 2025-08-22 PROCEDURE — 1159F MED LIST DOCD IN RCRD: CPT | Performed by: FAMILY MEDICINE

## 2025-08-22 PROCEDURE — 3075F SYST BP GE 130 - 139MM HG: CPT | Performed by: FAMILY MEDICINE

## 2025-08-22 PROCEDURE — G0439 PPPS, SUBSEQ VISIT: HCPCS | Performed by: FAMILY MEDICINE

## 2025-08-22 PROCEDURE — 1160F RVW MEDS BY RX/DR IN RCRD: CPT | Performed by: FAMILY MEDICINE

## 2025-08-22 RX ORDER — SERTRALINE HYDROCHLORIDE 25 MG/1
25 TABLET, FILM COATED ORAL DAILY
Qty: 30 TABLET | Refills: 3 | Status: SHIPPED | OUTPATIENT
Start: 2025-08-22 | End: 2025-10-21

## 2025-08-22 SDOH — ECONOMIC STABILITY: FOOD INSECURITY: WITHIN THE PAST 12 MONTHS, THE FOOD YOU BOUGHT JUST DIDN'T LAST AND YOU DIDN'T HAVE MONEY TO GET MORE.: NEVER TRUE

## 2025-08-22 SDOH — ECONOMIC STABILITY: FOOD INSECURITY: WITHIN THE PAST 12 MONTHS, YOU WORRIED THAT YOUR FOOD WOULD RUN OUT BEFORE YOU GOT MONEY TO BUY MORE.: NEVER TRUE

## 2025-08-22 SDOH — ECONOMIC STABILITY: TRANSPORTATION INSECURITY
IN THE PAST 12 MONTHS, HAS THE LACK OF TRANSPORTATION KEPT YOU FROM MEDICAL APPOINTMENTS OR FROM GETTING MEDICATIONS?: NO

## 2025-08-22 SDOH — ECONOMIC STABILITY: INCOME INSECURITY: IN THE LAST 12 MONTHS, WAS THERE A TIME WHEN YOU WERE NOT ABLE TO PAY THE MORTGAGE OR RENT ON TIME?: NO

## 2025-08-22 SDOH — ECONOMIC STABILITY: TRANSPORTATION INSECURITY
IN THE PAST 12 MONTHS, HAS LACK OF TRANSPORTATION KEPT YOU FROM MEETINGS, WORK, OR FROM GETTING THINGS NEEDED FOR DAILY LIVING?: NO

## 2025-08-22 ASSESSMENT — PATIENT HEALTH QUESTIONNAIRE - PHQ9
6. FEELING BAD ABOUT YOURSELF - OR THAT YOU ARE A FAILURE OR HAVE LET YOURSELF OR YOUR FAMILY DOWN: SEVERAL DAYS
7. TROUBLE CONCENTRATING ON THINGS, SUCH AS READING THE NEWSPAPER OR WATCHING TELEVISION: MORE THAN HALF THE DAYS
2. FEELING DOWN, DEPRESSED OR HOPELESS: SEVERAL DAYS
1. LITTLE INTEREST OR PLEASURE IN DOING THINGS: NEARLY EVERY DAY
4. FEELING TIRED OR HAVING LITTLE ENERGY: SEVERAL DAYS
8. MOVING OR SPEAKING SO SLOWLY THAT OTHER PEOPLE COULD HAVE NOTICED. OR THE OPPOSITE, BEING SO FIGETY OR RESTLESS THAT YOU HAVE BEEN MOVING AROUND A LOT MORE THAN USUAL: SEVERAL DAYS
SUM OF ALL RESPONSES TO PHQ QUESTIONS 1-9: 13
3. TROUBLE FALLING OR STAYING ASLEEP OR SLEEPING TOO MUCH: MORE THAN HALF THE DAYS
5. POOR APPETITE OR OVEREATING: MORE THAN HALF THE DAYS
9. THOUGHTS THAT YOU WOULD BE BETTER OFF DEAD, OR OF HURTING YOURSELF: NOT AT ALL
SUM OF ALL RESPONSES TO PHQ9 QUESTIONS 1 AND 2: 4

## 2025-08-22 ASSESSMENT — ACTIVITIES OF DAILY LIVING (ADL)
DRESSING: INDEPENDENT
BATHING: INDEPENDENT
GROCERY_SHOPPING: INDEPENDENT
TAKING_MEDICATION: INDEPENDENT
MANAGING_FINANCES: INDEPENDENT
DOING_HOUSEWORK: INDEPENDENT

## 2025-08-24 LAB
1,25(OH)2D SERPL-MCNC: NORMAL PG/ML
1,25(OH)2D2 SERPL-MCNC: NORMAL PG/ML
1,25(OH)2D3 SERPL-MCNC: NORMAL PG/ML
ALBUMIN SERPL-MCNC: 4.2 G/DL (ref 3.6–5.1)
ALP SERPL-CCNC: 88 U/L (ref 37–153)
ALT SERPL-CCNC: 15 U/L (ref 6–29)
ANION GAP SERPL CALCULATED.4IONS-SCNC: 9 MMOL/L (CALC) (ref 7–17)
AST SERPL-CCNC: 24 U/L (ref 10–35)
BASOPHILS # BLD AUTO: 28 CELLS/UL (ref 0–200)
BASOPHILS NFR BLD AUTO: 0.6 %
BILIRUB SERPL-MCNC: 0.6 MG/DL (ref 0.2–1.2)
BNP SERPL-MCNC: NORMAL PG/ML
BUN SERPL-MCNC: 28 MG/DL (ref 7–25)
CALCIUM SERPL-MCNC: 9.5 MG/DL (ref 8.6–10.4)
CHLORIDE SERPL-SCNC: 103 MMOL/L (ref 98–110)
CHOLEST SERPL-MCNC: 144 MG/DL
CHOLEST/HDLC SERPL: 1.9 (CALC)
CO2 SERPL-SCNC: 31 MMOL/L (ref 20–32)
CREAT SERPL-MCNC: 0.94 MG/DL (ref 0.5–1.05)
EGFRCR SERPLBLD CKD-EPI 2021: 67 ML/MIN/1.73M2
EOSINOPHIL # BLD AUTO: 108 CELLS/UL (ref 15–500)
EOSINOPHIL NFR BLD AUTO: 2.3 %
ERYTHROCYTE [DISTWIDTH] IN BLOOD BY AUTOMATED COUNT: 13.7 % (ref 11–15)
EST. AVERAGE GLUCOSE BLD GHB EST-MCNC: 108 MG/DL
EST. AVERAGE GLUCOSE BLD GHB EST-SCNC: 6 MMOL/L
FERRITIN SERPL-MCNC: 11 NG/ML (ref 16–288)
FOLATE SERPL-MCNC: 9.4 NG/ML
GLUCOSE SERPL-MCNC: 94 MG/DL (ref 65–99)
HBA1C MFR BLD: 5.4 %
HCT VFR BLD AUTO: 42.2 % (ref 35–45)
HDLC SERPL-MCNC: 76 MG/DL
HGB BLD-MCNC: 13.8 G/DL (ref 11.7–15.5)
LDLC SERPL CALC-MCNC: 54 MG/DL (CALC)
LYMPHOCYTES # BLD AUTO: 996 CELLS/UL (ref 850–3900)
LYMPHOCYTES NFR BLD AUTO: 21.2 %
MCH RBC QN AUTO: 29.6 PG (ref 27–33)
MCHC RBC AUTO-ENTMCNC: 32.7 G/DL (ref 32–36)
MCV RBC AUTO: 90.4 FL (ref 80–100)
MONOCYTES # BLD AUTO: 348 CELLS/UL (ref 200–950)
MONOCYTES NFR BLD AUTO: 7.4 %
NEUTROPHILS # BLD AUTO: 3220 CELLS/UL (ref 1500–7800)
NEUTROPHILS NFR BLD AUTO: 68.5 %
NONHDLC SERPL-MCNC: 68 MG/DL (CALC)
PLATELET # BLD AUTO: 180 THOUSAND/UL (ref 140–400)
PMV BLD REES-ECKER: 11.4 FL (ref 7.5–12.5)
POTASSIUM SERPL-SCNC: 4 MMOL/L (ref 3.5–5.3)
PROT SERPL-MCNC: 6.8 G/DL (ref 6.1–8.1)
RBC # BLD AUTO: 4.67 MILLION/UL (ref 3.8–5.1)
SODIUM SERPL-SCNC: 143 MMOL/L (ref 135–146)
T4 FREE SERPL-MCNC: 1.3 NG/DL (ref 0.8–1.8)
TRIGL SERPL-MCNC: 56 MG/DL
TSH SERPL-ACNC: 2.17 MIU/L (ref 0.4–4.5)
VIT B1 BLD-SCNC: NORMAL NMOL/L
VIT B12 SERPL-MCNC: 404 PG/ML (ref 200–1100)
WBC # BLD AUTO: 4.7 THOUSAND/UL (ref 3.8–10.8)
ZINC SERPL-MCNC: 44 MCG/DL (ref 60–130)

## 2025-08-27 ENCOUNTER — TELEPHONE (OUTPATIENT)
Dept: PRIMARY CARE | Facility: CLINIC | Age: 65
End: 2025-08-27
Payer: COMMERCIAL

## 2025-08-27 LAB
1,25(OH)2D SERPL-MCNC: 41 PG/ML (ref 18–72)
1,25(OH)2D2 SERPL-MCNC: <8 PG/ML
1,25(OH)2D3 SERPL-MCNC: 41 PG/ML
ALBUMIN SERPL-MCNC: 4.2 G/DL (ref 3.6–5.1)
ALP SERPL-CCNC: 88 U/L (ref 37–153)
ALT SERPL-CCNC: 15 U/L (ref 6–29)
ANION GAP SERPL CALCULATED.4IONS-SCNC: 9 MMOL/L (CALC) (ref 7–17)
AST SERPL-CCNC: 24 U/L (ref 10–35)
BASOPHILS # BLD AUTO: 28 CELLS/UL (ref 0–200)
BASOPHILS NFR BLD AUTO: 0.6 %
BILIRUB SERPL-MCNC: 0.6 MG/DL (ref 0.2–1.2)
BNP SERPL-MCNC: 111 PG/ML
BUN SERPL-MCNC: 28 MG/DL (ref 7–25)
CALCIUM SERPL-MCNC: 9.5 MG/DL (ref 8.6–10.4)
CHLORIDE SERPL-SCNC: 103 MMOL/L (ref 98–110)
CHOLEST SERPL-MCNC: 144 MG/DL
CHOLEST/HDLC SERPL: 1.9 (CALC)
CO2 SERPL-SCNC: 31 MMOL/L (ref 20–32)
CREAT SERPL-MCNC: 0.94 MG/DL (ref 0.5–1.05)
EGFRCR SERPLBLD CKD-EPI 2021: 67 ML/MIN/1.73M2
EOSINOPHIL # BLD AUTO: 108 CELLS/UL (ref 15–500)
EOSINOPHIL NFR BLD AUTO: 2.3 %
ERYTHROCYTE [DISTWIDTH] IN BLOOD BY AUTOMATED COUNT: 13.7 % (ref 11–15)
EST. AVERAGE GLUCOSE BLD GHB EST-MCNC: 108 MG/DL
EST. AVERAGE GLUCOSE BLD GHB EST-SCNC: 6 MMOL/L
FERRITIN SERPL-MCNC: 11 NG/ML (ref 16–288)
FOLATE SERPL-MCNC: 9.4 NG/ML
GLUCOSE SERPL-MCNC: 94 MG/DL (ref 65–99)
HBA1C MFR BLD: 5.4 %
HCT VFR BLD AUTO: 42.2 % (ref 35–45)
HDLC SERPL-MCNC: 76 MG/DL
HGB BLD-MCNC: 13.8 G/DL (ref 11.7–15.5)
LDLC SERPL CALC-MCNC: 54 MG/DL (CALC)
LYMPHOCYTES # BLD AUTO: 996 CELLS/UL (ref 850–3900)
LYMPHOCYTES NFR BLD AUTO: 21.2 %
MCH RBC QN AUTO: 29.6 PG (ref 27–33)
MCHC RBC AUTO-ENTMCNC: 32.7 G/DL (ref 32–36)
MCV RBC AUTO: 90.4 FL (ref 80–100)
MONOCYTES # BLD AUTO: 348 CELLS/UL (ref 200–950)
MONOCYTES NFR BLD AUTO: 7.4 %
NEUTROPHILS # BLD AUTO: 3220 CELLS/UL (ref 1500–7800)
NEUTROPHILS NFR BLD AUTO: 68.5 %
NONHDLC SERPL-MCNC: 68 MG/DL (CALC)
PLATELET # BLD AUTO: 180 THOUSAND/UL (ref 140–400)
PMV BLD REES-ECKER: 11.4 FL (ref 7.5–12.5)
POTASSIUM SERPL-SCNC: 4 MMOL/L (ref 3.5–5.3)
PROT SERPL-MCNC: 6.8 G/DL (ref 6.1–8.1)
RBC # BLD AUTO: 4.67 MILLION/UL (ref 3.8–5.1)
SODIUM SERPL-SCNC: 143 MMOL/L (ref 135–146)
T4 FREE SERPL-MCNC: 1.3 NG/DL (ref 0.8–1.8)
TRIGL SERPL-MCNC: 56 MG/DL
TSH SERPL-ACNC: 2.17 MIU/L (ref 0.4–4.5)
VIT B1 BLD-SCNC: 113 NMOL/L (ref 78–185)
VIT B12 SERPL-MCNC: 404 PG/ML (ref 200–1100)
WBC # BLD AUTO: 4.7 THOUSAND/UL (ref 3.8–10.8)
ZINC SERPL-MCNC: 44 MCG/DL (ref 60–130)

## 2025-09-02 ENCOUNTER — TELEPHONE (OUTPATIENT)
Dept: PRIMARY CARE | Facility: CLINIC | Age: 65
End: 2025-09-02
Payer: COMMERCIAL

## 2025-09-03 ENCOUNTER — OFFICE VISIT (OUTPATIENT)
Dept: ORTHOPEDIC SURGERY | Facility: CLINIC | Age: 65
End: 2025-09-03
Payer: COMMERCIAL

## 2025-09-03 VITALS — BODY MASS INDEX: 27.4 KG/M2 | WEIGHT: 127 LBS | HEIGHT: 57 IN

## 2025-09-03 DIAGNOSIS — S32.591A PUBIC RAMUS FRACTURE, RIGHT, CLOSED, INITIAL ENCOUNTER (MULTI): ICD-10-CM

## 2025-09-03 PROCEDURE — 3008F BODY MASS INDEX DOCD: CPT | Performed by: STUDENT IN AN ORGANIZED HEALTH CARE EDUCATION/TRAINING PROGRAM

## 2025-09-03 PROCEDURE — 99204 OFFICE O/P NEW MOD 45 MIN: CPT | Performed by: STUDENT IN AN ORGANIZED HEALTH CARE EDUCATION/TRAINING PROGRAM

## 2025-09-03 RX ORDER — OXYCODONE AND ACETAMINOPHEN 5; 325 MG/1; MG/1
1 TABLET ORAL EVERY 12 HOURS PRN
Qty: 6 TABLET | Refills: 0 | Status: SHIPPED | OUTPATIENT
Start: 2025-09-03 | End: 2025-09-08

## 2025-09-04 ENCOUNTER — HOSPITAL ENCOUNTER (OUTPATIENT)
Dept: RADIOLOGY | Facility: HOSPITAL | Age: 65
Discharge: HOME | End: 2025-09-04
Payer: COMMERCIAL

## 2025-09-04 DIAGNOSIS — S32.591A PUBIC RAMUS FRACTURE, RIGHT, CLOSED, INITIAL ENCOUNTER (MULTI): ICD-10-CM

## 2025-09-04 PROCEDURE — 73700 CT LOWER EXTREMITY W/O DYE: CPT | Mod: RT

## 2025-09-05 ENCOUNTER — APPOINTMENT (OUTPATIENT)
Dept: ORTHOPEDIC SURGERY | Facility: CLINIC | Age: 65
End: 2025-09-05
Payer: COMMERCIAL

## 2025-09-09 ENCOUNTER — APPOINTMENT (OUTPATIENT)
Dept: ORTHOPEDIC SURGERY | Facility: CLINIC | Age: 65
End: 2025-09-09
Payer: COMMERCIAL

## 2025-10-07 ENCOUNTER — APPOINTMENT (OUTPATIENT)
Dept: PRIMARY CARE | Facility: CLINIC | Age: 65
End: 2025-10-07
Payer: COMMERCIAL

## 2025-11-13 ENCOUNTER — APPOINTMENT (OUTPATIENT)
Dept: OBSTETRICS AND GYNECOLOGY | Facility: CLINIC | Age: 65
End: 2025-11-13
Payer: COMMERCIAL